# Patient Record
Sex: FEMALE | Race: BLACK OR AFRICAN AMERICAN | NOT HISPANIC OR LATINO | URBAN - METROPOLITAN AREA
[De-identification: names, ages, dates, MRNs, and addresses within clinical notes are randomized per-mention and may not be internally consistent; named-entity substitution may affect disease eponyms.]

---

## 2022-01-10 ENCOUNTER — INPATIENT (INPATIENT)
Facility: HOSPITAL | Age: 77
LOS: 10 days | Discharge: NURSING, REHAB OR LONG-TERM CARE | DRG: 177 | End: 2022-01-21
Attending: STUDENT IN AN ORGANIZED HEALTH CARE EDUCATION/TRAINING PROGRAM | Admitting: STUDENT IN AN ORGANIZED HEALTH CARE EDUCATION/TRAINING PROGRAM
Payer: MEDICARE

## 2022-01-10 VITALS
TEMPERATURE: 99 F | DIASTOLIC BLOOD PRESSURE: 79 MMHG | WEIGHT: 199.96 LBS | HEART RATE: 82 BPM | OXYGEN SATURATION: 97 % | HEIGHT: 62 IN | RESPIRATION RATE: 20 BRPM | SYSTOLIC BLOOD PRESSURE: 119 MMHG

## 2022-01-10 DIAGNOSIS — E03.9 HYPOTHYROIDISM, UNSPECIFIED: ICD-10-CM

## 2022-01-10 DIAGNOSIS — R55 SYNCOPE AND COLLAPSE: ICD-10-CM

## 2022-01-10 DIAGNOSIS — U07.1 COVID-19: ICD-10-CM

## 2022-01-10 DIAGNOSIS — R41.82 ALTERED MENTAL STATUS, UNSPECIFIED: ICD-10-CM

## 2022-01-10 DIAGNOSIS — Z29.9 ENCOUNTER FOR PROPHYLACTIC MEASURES, UNSPECIFIED: ICD-10-CM

## 2022-01-10 DIAGNOSIS — G93.40 ENCEPHALOPATHY, UNSPECIFIED: ICD-10-CM

## 2022-01-10 DIAGNOSIS — D86.9 SARCOIDOSIS, UNSPECIFIED: ICD-10-CM

## 2022-01-10 LAB
ALBUMIN SERPL ELPH-MCNC: 2.3 G/DL — LOW (ref 3.5–5)
ALP SERPL-CCNC: 52 U/L — SIGNIFICANT CHANGE UP (ref 40–120)
ALT FLD-CCNC: 33 U/L DA — SIGNIFICANT CHANGE UP (ref 10–60)
ANION GAP SERPL CALC-SCNC: 7 MMOL/L — SIGNIFICANT CHANGE UP (ref 5–17)
APPEARANCE UR: CLEAR — SIGNIFICANT CHANGE UP
APTT BLD: 32.1 SEC — SIGNIFICANT CHANGE UP (ref 27.5–35.5)
AST SERPL-CCNC: 70 U/L — HIGH (ref 10–40)
BASOPHILS # BLD AUTO: 0.04 K/UL — SIGNIFICANT CHANGE UP (ref 0–0.2)
BASOPHILS NFR BLD AUTO: 0.5 % — SIGNIFICANT CHANGE UP (ref 0–2)
BILIRUB SERPL-MCNC: 1.4 MG/DL — HIGH (ref 0.2–1.2)
BILIRUB UR-MCNC: NEGATIVE — SIGNIFICANT CHANGE UP
BUN SERPL-MCNC: 12 MG/DL — SIGNIFICANT CHANGE UP (ref 7–18)
CALCIUM SERPL-MCNC: 8.5 MG/DL — SIGNIFICANT CHANGE UP (ref 8.4–10.5)
CHLORIDE SERPL-SCNC: 100 MMOL/L — SIGNIFICANT CHANGE UP (ref 96–108)
CK SERPL-CCNC: 488 U/L — HIGH (ref 21–215)
CO2 SERPL-SCNC: 30 MMOL/L — SIGNIFICANT CHANGE UP (ref 22–31)
COLOR SPEC: YELLOW — SIGNIFICANT CHANGE UP
CREAT SERPL-MCNC: 0.95 MG/DL — SIGNIFICANT CHANGE UP (ref 0.5–1.3)
DIFF PNL FLD: ABNORMAL
EOSINOPHIL # BLD AUTO: 0.14 K/UL — SIGNIFICANT CHANGE UP (ref 0–0.5)
EOSINOPHIL NFR BLD AUTO: 1.7 % — SIGNIFICANT CHANGE UP (ref 0–6)
GLUCOSE SERPL-MCNC: 117 MG/DL — HIGH (ref 70–99)
GLUCOSE UR QL: NEGATIVE — SIGNIFICANT CHANGE UP
HCT VFR BLD CALC: 41.5 % — SIGNIFICANT CHANGE UP (ref 34.5–45)
HGB BLD-MCNC: 13.6 G/DL — SIGNIFICANT CHANGE UP (ref 11.5–15.5)
IMM GRANULOCYTES NFR BLD AUTO: 1.5 % — SIGNIFICANT CHANGE UP (ref 0–1.5)
INR BLD: 1.08 RATIO — SIGNIFICANT CHANGE UP (ref 0.88–1.16)
KETONES UR-MCNC: ABNORMAL
LACTATE SERPL-SCNC: 0.8 MMOL/L — SIGNIFICANT CHANGE UP (ref 0.7–2)
LEUKOCYTE ESTERASE UR-ACNC: NEGATIVE — SIGNIFICANT CHANGE UP
LYMPHOCYTES # BLD AUTO: 0.79 K/UL — LOW (ref 1–3.3)
LYMPHOCYTES # BLD AUTO: 9.3 % — LOW (ref 13–44)
MCHC RBC-ENTMCNC: 30 PG — SIGNIFICANT CHANGE UP (ref 27–34)
MCHC RBC-ENTMCNC: 32.8 GM/DL — SIGNIFICANT CHANGE UP (ref 32–36)
MCV RBC AUTO: 91.4 FL — SIGNIFICANT CHANGE UP (ref 80–100)
MONOCYTES # BLD AUTO: 0.69 K/UL — SIGNIFICANT CHANGE UP (ref 0–0.9)
MONOCYTES NFR BLD AUTO: 8.1 % — SIGNIFICANT CHANGE UP (ref 2–14)
NEUTROPHILS # BLD AUTO: 6.68 K/UL — SIGNIFICANT CHANGE UP (ref 1.8–7.4)
NEUTROPHILS NFR BLD AUTO: 78.9 % — HIGH (ref 43–77)
NITRITE UR-MCNC: NEGATIVE — SIGNIFICANT CHANGE UP
NRBC # BLD: 0 /100 WBCS — SIGNIFICANT CHANGE UP (ref 0–0)
NT-PROBNP SERPL-SCNC: 47 PG/ML — SIGNIFICANT CHANGE UP (ref 0–450)
PH UR: 6 — SIGNIFICANT CHANGE UP (ref 5–8)
PLATELET # BLD AUTO: 480 K/UL — HIGH (ref 150–400)
POTASSIUM SERPL-MCNC: 4.6 MMOL/L — SIGNIFICANT CHANGE UP (ref 3.5–5.3)
POTASSIUM SERPL-SCNC: 4.6 MMOL/L — SIGNIFICANT CHANGE UP (ref 3.5–5.3)
PROT SERPL-MCNC: 7.7 G/DL — SIGNIFICANT CHANGE UP (ref 6–8.3)
PROT UR-MCNC: 30 MG/DL
PROTHROM AB SERPL-ACNC: 12.8 SEC — SIGNIFICANT CHANGE UP (ref 10.6–13.6)
RBC # BLD: 4.54 M/UL — SIGNIFICANT CHANGE UP (ref 3.8–5.2)
RBC # FLD: 13.6 % — SIGNIFICANT CHANGE UP (ref 10.3–14.5)
SARS-COV-2 RNA SPEC QL NAA+PROBE: DETECTED
SODIUM SERPL-SCNC: 137 MMOL/L — SIGNIFICANT CHANGE UP (ref 135–145)
SP GR SPEC: 1.01 — SIGNIFICANT CHANGE UP (ref 1.01–1.02)
TROPONIN I, HIGH SENSITIVITY RESULT: 19.6 NG/L — SIGNIFICANT CHANGE UP
UROBILINOGEN FLD QL: 4
WBC # BLD: 8.47 K/UL — SIGNIFICANT CHANGE UP (ref 3.8–10.5)
WBC # FLD AUTO: 8.47 K/UL — SIGNIFICANT CHANGE UP (ref 3.8–10.5)

## 2022-01-10 PROCEDURE — 93010 ELECTROCARDIOGRAM REPORT: CPT

## 2022-01-10 PROCEDURE — 99223 1ST HOSP IP/OBS HIGH 75: CPT

## 2022-01-10 PROCEDURE — 70450 CT HEAD/BRAIN W/O DYE: CPT | Mod: 26,MA

## 2022-01-10 PROCEDURE — 99285 EMERGENCY DEPT VISIT HI MDM: CPT

## 2022-01-10 PROCEDURE — 71045 X-RAY EXAM CHEST 1 VIEW: CPT | Mod: 26

## 2022-01-10 RX ORDER — SODIUM CHLORIDE 9 MG/ML
1000 INJECTION INTRAMUSCULAR; INTRAVENOUS; SUBCUTANEOUS ONCE
Refills: 0 | Status: COMPLETED | OUTPATIENT
Start: 2022-01-10 | End: 2022-01-10

## 2022-01-10 RX ORDER — CEFTRIAXONE 500 MG/1
1000 INJECTION, POWDER, FOR SOLUTION INTRAMUSCULAR; INTRAVENOUS ONCE
Refills: 0 | Status: DISCONTINUED | OUTPATIENT
Start: 2022-01-10 | End: 2022-01-10

## 2022-01-10 RX ADMIN — SODIUM CHLORIDE 1000 MILLILITER(S): 9 INJECTION INTRAMUSCULAR; INTRAVENOUS; SUBCUTANEOUS at 18:00

## 2022-01-10 RX ADMIN — SODIUM CHLORIDE 1000 MILLILITER(S): 9 INJECTION INTRAMUSCULAR; INTRAVENOUS; SUBCUTANEOUS at 19:05

## 2022-01-10 NOTE — H&P ADULT - PROBLEM SELECTOR PLAN 4
- h/o sarcoidosis, not on any meds  - CXR no hilar or mediastinal adenopathy noted  - supportive care

## 2022-01-10 NOTE — H&P ADULT - PROBLEM SELECTOR PLAN 2
- ddx: hypothyroidism vs seizure vs COVID encephalopathy  - as above  - primary team to consult neuro in am

## 2022-01-10 NOTE — H&P ADULT - PROBLEM SELECTOR PLAN 1
- p/w loss of consciousness at home, w/ possible shaking and post-ictal state; also AMS x2wks per daughter  - COVID pos  - lactate 0.8  - trop 19.6  -   - EKG NSR. lateral T wave inversion  - CTH unremarkable  - f/u orthostatic BP, echo, TSH, cortisol, ESR/CRP

## 2022-01-10 NOTE — H&P ADULT - ASSESSMENT
Patient is a 76yoF, AAOx2 and minimally ambulatory at baseline, w/ known PMH of sarcoidosis and hypothyroidism (non-complaint w/ medications), presents with loss of unconsciousness. Admitted for syncope work up.

## 2022-01-10 NOTE — ED PROVIDER NOTE - PROGRESS NOTE DETAILS
Spoke with patient's daughter, Dariana Vásquez, on phone.  Daughter reports that at approximately 10 or 11 am today, patient became very confused: didn't recognize daughter or granddaughter, couldn't perform ADL at her baseline, was speaking "gibberish", was unable to walk at her baseline.  At around 11am, patient lost consciousness while sitting.  Daughter reports that patient urinated on herself and was breathing heavily while unconscious but did not hit her head, did not exhibit any seizure activity, and did not empty her bowels.      Daughter states that the patient has been increasingly confused over the past 2-3 months, including seeing figures walking around.  One week ago, patient was very confused and had a cough, and was brought into [facility], where she was diagnosed with a UTI and COVID and discharged on antibiotics - daughter unable to recall which antibiotics, which she has been taking regularly twice daily.  Patient lives at home with her grandson and granddaughter.  At baseline, she is oriented to self, recognizes family members, and is able to perform most ADLs with some help.      PMH: sarcoidosis, hypothyroid  PSH: hysterectomy many years ago  medications: none regularly  allergies: NKDA  Soc: patient chews tobacco daily, does not drink alcohol or use other illicit drugs. Spoke with patient's daughter, Dariana Vásquez, on phone.  Informed that patient will be admitted to the hospital for further workup.  Daughter expressed understanding.  Daughter confirmed that patient does not have a PCP.

## 2022-01-10 NOTE — ED PROVIDER NOTE - ATTENDING CONTRIBUTION TO CARE
Patient with ams and confused speech today at home as well as syncope and urinary incontinence. no obvious shaking noted by family. on exam, awake, follows commands, strength and sensation grossly intact in both arms and legs, face symmetric. NIHSS 0, passed dysphagia evaluation.

## 2022-01-10 NOTE — ED PROVIDER NOTE - OBJECTIVE STATEMENT
Patient is a 77 y/o Female poor historian unable to provide chief complaint.  Patient does not know why she is in the hospital and states that she feels well.  Denies nausea, vomiting, headache, loss of appetite, recent falls, syncope.

## 2022-01-10 NOTE — H&P ADULT - HISTORY OF PRESENT ILLNESS
Patient is a 76yoF, AAOx2 and minimally ambulatory at baseline, w/ known PMH of sarcoidosis and hypothyroidism (non-complaint w/ medications), presents with loss of unconsciousness. Patient is a poor historian; thus, information obtained from the daughter, Mrs. Westbrook. Patient was noted to be breathing heavy, and suddently fell to a chair. She was also unresponsive during the event. Patient does not remember the events. She complains of sharp, intermittent, 3/10, non-radiating, b/l leg pain x2wk. She denies fever, chills, n/v, chest pain, SOB, abdominal pain, urinary or bowel movement changes.    According to the daughter, patient started to develop altered mental status since last week. She was taken to the [facility] for that reason, but was discharged the next day. Patient was intermittently unable to operate phone or TV, and noted to speak in "jibberish. She was also seen looking for a person, who reportedly already passed away.

## 2022-01-10 NOTE — ED PROVIDER NOTE - PHYSICAL EXAMINATION
PHYSICAL EXAM:    GENERAL: NAD, well-developed  HEAD:  Atraumatic, Normocephalic  EYES: EOMI, PERRLA, conjunctiva and sclera clear  NECK: Supple, No JVD  CHEST/LUNG: Clear to auscultation bilaterally; No rhonchis, rales, or wheezing  HEART: Regular rate and rhythm; S1, S2; No murmurs, rubs, or gallops  ABDOMEN: Soft, Nontender, Nondistended; Normoactive bowel sounds  EXTREMITIES:  2+ Peripheral Pulses, No clubbing, cyanosis, or edema  PSYCH: A&Ox1, oriented only to self  NEUROLOGY: non-focal  SKIN: No rashes or lesions

## 2022-01-10 NOTE — H&P ADULT - ATTENDING COMMENTS
Discussed with PGY2 Dr. Ghosh.    This is a 75 y/o female presenting with altered mental status per family. Patient was recently diagnosed with UTI and COVID-19 at [facility] and discharged on oral antibiotics. Per daughter, patient's baseline mental status has been worsening over past few months, but patient acutely became more confused this morning, unable to recognize daughter.    Vital Signs Last 24 Hrs  T(C): 36.6 (10 Ryan 2022 15:33), Max: 37.1 (10 Ryan 2022 12:59)  T(F): 97.8 (10 Ryan 2022 15:33), Max: 98.7 (10 Ryan 2022 12:59)  HR: 68 (10 Ryan 2022 15:33) (68 - 82)  BP: 110/70 (10 Ryan 2022 15:33) (110/70 - 119/79)  BP(mean): --  RR: 20 (10 Ryan 2022 15:33) (20 - 20)  SpO2: 93% (10 Ryan 2022 15:33) (93% - 97%)    A/P:  1. Encephalopathy  - CT head reviewed w/o acute pathology; somewhat limited by motion artifact  - afebrile, no leukocytosis; UA negative  - no electrolyte derangements on initial lab studies  - can consider Covid-related encephalopathy superimposed on worsening dementia over past several months  - will need to review patient's home medications to r/o iatrogenic causes  - fall precautions  - avoid sedative hypnotics    2. Abnormal EKG  - EKG reviewed - TWI in anterior and lateral leads appreciated  - no prior available to compare  - obtain records from recent hospital visit - perhaps chronic?  - given patient does endorse SOB, will monitor on tele for now  - trop negative x1 - will check another set  - check TTE    3. COVID-19  - not requiring any O2 support  - isolation, supportive care    4. DVT ppx  - lovenox Discussed with PGY2 Dr. Ghosh.    This is a 75 y/o female presenting with altered mental status per family. Patient was recently diagnosed with UTI and COVID-19 at [facility] and discharged on oral antibiotics. Per daughter, patient's baseline mental status has been worsening over past few months, but patient acutely became more confused this morning, unable to recognize daughter. Brief period of LOC vs decreased consciousness per daughter.    Vital Signs Last 24 Hrs  T(C): 36.6 (10 Ryan 2022 15:33), Max: 37.1 (10 Ryan 2022 12:59)  T(F): 97.8 (10 Ryan 2022 15:33), Max: 98.7 (10 Ryan 2022 12:59)  HR: 68 (10 Ryan 2022 15:33) (68 - 82)  BP: 110/70 (10 Ryan 2022 15:33) (110/70 - 119/79)  BP(mean): --  RR: 20 (10 Ryan 2022 15:33) (20 - 20)  SpO2: 93% (10 Ryan 2022 15:33) (93% - 97%)    A/P:  1. Encephalopathy  - CT head reviewed w/o acute pathology; somewhat limited by motion artifact  - afebrile, no leukocytosis; UA negative  - no electrolyte derangements on initial lab studies  - can consider Covid-related encephalopathy superimposed on worsening dementia over past several months vs seizure-like activity  - will need to review patient's home medications to r/o iatrogenic causes  - fall precautions  - avoid sedative hypnotics  - neuro eval    2. Abnormal EKG  - EKG reviewed - TWI in multiple leads noted  - no prior available to compare  - obtain records from recent hospital visit - perhaps chronic?  - given patient does endorse subjective SOB, will monitor on tele for now  - trop negative x1 - will check another set  - check TTE    3. COVID-19  - not requiring any O2 support  - isolation, supportive care    4. DVT ppx  - lovenox Discussed with PGY2 Dr. Ghosh.    This is a 77 y/o female presenting with altered mental status per family. Patient was recently diagnosed with UTI and COVID-19 at [facility] and discharged on oral antibiotics. Per daughter, patient's baseline mental status has been worsening over past few months, but patient acutely became more confused this morning, unable to recognize daughter. Brief period of LOC vs decreased consciousness per daughter.    Vital Signs Last 24 Hrs  T(C): 36.6 (10 Ryan 2022 15:33), Max: 37.1 (10 Ryan 2022 12:59)  T(F): 97.8 (10 Ryan 2022 15:33), Max: 98.7 (10 Ryan 2022 12:59)  HR: 68 (10 Ryan 2022 15:33) (68 - 82)  BP: 110/70 (10 Ryan 2022 15:33) (110/70 - 119/79)  BP(mean): --  RR: 20 (10 Ryan 2022 15:33) (20 - 20)  SpO2: 93% (10 Ryan 2022 15:33) (93% - 97%)    A/P:  1. Encephalopathy  - CT head reviewed w/o acute pathology; somewhat limited by motion artifact  - afebrile, no leukocytosis; UA negative  - no electrolyte derangements on initial lab studies  - can consider Covid-related encephalopathy superimposed on worsening dementia over past several months vs seizure-like activity  - will need to review patient's home medications to r/o iatrogenic causes  - fall precautions  - avoid sedative hypnotics  - neuro eval    2. Abnormal EKG  - EKG reviewed - TWI in multiple leads noted  - no prior available to compare  - obtain records from recent hospital visit - perhaps chronic?  - given patient does endorse subjective SOB, will monitor on tele for now  - trop negative x1 - will check another set  - check TTE    3. COVID-19  - not requiring any O2 support  - isolation, supportive care    4. Hypothyroidism  - per daughter patient noncompliant with meds  - check TSH    5. Sarcoidosis  - not on any meds at home  - no active signs or symptoms at this time; unclear how patient was diagnosed  - outpatient follow up     6. DVT ppx  - lovenox Discussed with PGY2 Dr. Ghosh.    This is a 77 y/o female presenting with altered mental status per family. Patient was recently diagnosed with UTI and COVID-19 at [facility] and discharged on oral antibiotics. Per daughter, patient's baseline mental status has been worsening over past few months, but patient acutely became more confused this morning, unable to recognize daughter. Brief period of LOC vs decreased consciousness per daughter.    Vital Signs Last 24 Hrs  T(C): 36.6 (10 Ryan 2022 15:33), Max: 37.1 (10 Ryan 2022 12:59)  T(F): 97.8 (10 Ryan 2022 15:33), Max: 98.7 (10 Ryan 2022 12:59)  HR: 68 (10 Ryan 2022 15:33) (68 - 82)  BP: 110/70 (10 Ryan 2022 15:33) (110/70 - 119/79)  BP(mean): --  RR: 20 (10 Ryan 2022 15:33) (20 - 20)  SpO2: 93% (10 Ryan 2022 15:33) (93% - 97%)    A/P:  1. Encephalopathy  - CT head reviewed w/o acute pathology; somewhat limited by motion artifact  - afebrile, no leukocytosis; UA negative  - no electrolyte derangements on initial lab studies  - can consider Covid-related encephalopathy superimposed on worsening dementia over past several months vs seizure-like activity  - will need to review any potential home medications to r/o iatrogenic causes  - fall precautions  - avoid sedative hypnotics  - neuro eval    2. Abnormal EKG  - EKG reviewed - TWI in multiple leads noted  - no prior available to compare  - obtain records from recent hospital visit - perhaps chronic?  - given patient does endorse subjective SOB, will monitor on tele for now  - trop negative x1 - will check another set  - check TTE    3. COVID-19  - not requiring any O2 support  - isolation, supportive care    4. Hypothyroidism  - per daughter patient noncompliant with meds  - check TSH    5. Sarcoidosis  - not on any meds at home  - no active signs or symptoms at this time; unclear how patient was diagnosed  - outpatient follow up     6. DVT ppx  - lovenox

## 2022-01-10 NOTE — ED PROVIDER NOTE - CLINICAL SUMMARY MEDICAL DECISION MAKING FREE TEXT BOX
76y F poor historian unable to provide history.  Patient does not know why she is in the hospital and states that she feels well.  Denies nausea, vomiting, headache, loss of appetite, recent falls, syncope.  No contacts listed.  Sepsis and neuro workup ordered

## 2022-01-11 LAB
A1C WITH ESTIMATED AVERAGE GLUCOSE RESULT: 6 % — HIGH (ref 4–5.6)
ALBUMIN SERPL ELPH-MCNC: 2.4 G/DL — LOW (ref 3.5–5)
ALP SERPL-CCNC: 49 U/L — SIGNIFICANT CHANGE UP (ref 40–120)
ALT FLD-CCNC: 28 U/L DA — SIGNIFICANT CHANGE UP (ref 10–60)
ANION GAP SERPL CALC-SCNC: 6 MMOL/L — SIGNIFICANT CHANGE UP (ref 5–17)
ANION GAP SERPL CALC-SCNC: 8 MMOL/L — SIGNIFICANT CHANGE UP (ref 5–17)
AST SERPL-CCNC: 31 U/L — SIGNIFICANT CHANGE UP (ref 10–40)
BASOPHILS # BLD AUTO: 0.04 K/UL — SIGNIFICANT CHANGE UP (ref 0–0.2)
BASOPHILS NFR BLD AUTO: 0.4 % — SIGNIFICANT CHANGE UP (ref 0–2)
BILIRUB SERPL-MCNC: 1.3 MG/DL — HIGH (ref 0.2–1.2)
BUN SERPL-MCNC: 10 MG/DL — SIGNIFICANT CHANGE UP (ref 7–18)
BUN SERPL-MCNC: 12 MG/DL — SIGNIFICANT CHANGE UP (ref 7–18)
CALCIUM SERPL-MCNC: 8.5 MG/DL — SIGNIFICANT CHANGE UP (ref 8.4–10.5)
CALCIUM SERPL-MCNC: 8.7 MG/DL — SIGNIFICANT CHANGE UP (ref 8.4–10.5)
CHLORIDE SERPL-SCNC: 103 MMOL/L — SIGNIFICANT CHANGE UP (ref 96–108)
CHLORIDE SERPL-SCNC: 105 MMOL/L — SIGNIFICANT CHANGE UP (ref 96–108)
CHOLEST SERPL-MCNC: 178 MG/DL — SIGNIFICANT CHANGE UP
CO2 SERPL-SCNC: 28 MMOL/L — SIGNIFICANT CHANGE UP (ref 22–31)
CO2 SERPL-SCNC: 29 MMOL/L — SIGNIFICANT CHANGE UP (ref 22–31)
CREAT SERPL-MCNC: 0.73 MG/DL — SIGNIFICANT CHANGE UP (ref 0.5–1.3)
CREAT SERPL-MCNC: 0.76 MG/DL — SIGNIFICANT CHANGE UP (ref 0.5–1.3)
CRP SERPL-MCNC: 108 MG/L — HIGH
D DIMER BLD IA.RAPID-MCNC: 399 NG/ML DDU — HIGH
EOSINOPHIL # BLD AUTO: 0.2 K/UL — SIGNIFICANT CHANGE UP (ref 0–0.5)
EOSINOPHIL NFR BLD AUTO: 2.2 % — SIGNIFICANT CHANGE UP (ref 0–6)
ERYTHROCYTE [SEDIMENTATION RATE] IN BLOOD: 80 MM/HR — HIGH (ref 0–20)
ESTIMATED AVERAGE GLUCOSE: 126 MG/DL — HIGH (ref 68–114)
FERRITIN SERPL-MCNC: 634 NG/ML — HIGH (ref 15–150)
FIBRINOGEN PPP-MCNC: 882 MG/DL — HIGH (ref 290–520)
GLUCOSE SERPL-MCNC: 106 MG/DL — HIGH (ref 70–99)
GLUCOSE SERPL-MCNC: 79 MG/DL — SIGNIFICANT CHANGE UP (ref 70–99)
HCT VFR BLD CALC: 40.8 % — SIGNIFICANT CHANGE UP (ref 34.5–45)
HCV AB S/CO SERPL IA: 0.12 S/CO — SIGNIFICANT CHANGE UP (ref 0–0.99)
HCV AB SERPL-IMP: SIGNIFICANT CHANGE UP
HDLC SERPL-MCNC: 43 MG/DL — LOW
HGB BLD-MCNC: 13.3 G/DL — SIGNIFICANT CHANGE UP (ref 11.5–15.5)
IMM GRANULOCYTES NFR BLD AUTO: 1 % — SIGNIFICANT CHANGE UP (ref 0–1.5)
LDH SERPL L TO P-CCNC: 312 U/L — HIGH (ref 120–225)
LIPID PNL WITH DIRECT LDL SERPL: 116 MG/DL — HIGH
LYMPHOCYTES # BLD AUTO: 1.17 K/UL — SIGNIFICANT CHANGE UP (ref 1–3.3)
LYMPHOCYTES # BLD AUTO: 13.1 % — SIGNIFICANT CHANGE UP (ref 13–44)
MAGNESIUM SERPL-MCNC: 2.4 MG/DL — SIGNIFICANT CHANGE UP (ref 1.6–2.6)
MCHC RBC-ENTMCNC: 29.8 PG — SIGNIFICANT CHANGE UP (ref 27–34)
MCHC RBC-ENTMCNC: 32.6 GM/DL — SIGNIFICANT CHANGE UP (ref 32–36)
MCV RBC AUTO: 91.5 FL — SIGNIFICANT CHANGE UP (ref 80–100)
MONOCYTES # BLD AUTO: 0.72 K/UL — SIGNIFICANT CHANGE UP (ref 0–0.9)
MONOCYTES NFR BLD AUTO: 8.1 % — SIGNIFICANT CHANGE UP (ref 2–14)
NEUTROPHILS # BLD AUTO: 6.7 K/UL — SIGNIFICANT CHANGE UP (ref 1.8–7.4)
NEUTROPHILS NFR BLD AUTO: 75.2 % — SIGNIFICANT CHANGE UP (ref 43–77)
NON HDL CHOLESTEROL: 135 MG/DL — HIGH
NRBC # BLD: 0 /100 WBCS — SIGNIFICANT CHANGE UP (ref 0–0)
PHOSPHATE SERPL-MCNC: 2.2 MG/DL — LOW (ref 2.5–4.5)
PLATELET # BLD AUTO: 503 K/UL — HIGH (ref 150–400)
POTASSIUM SERPL-MCNC: 2.7 MMOL/L — CRITICAL LOW (ref 3.5–5.3)
POTASSIUM SERPL-MCNC: 3.8 MMOL/L — SIGNIFICANT CHANGE UP (ref 3.5–5.3)
POTASSIUM SERPL-SCNC: 2.7 MMOL/L — CRITICAL LOW (ref 3.5–5.3)
POTASSIUM SERPL-SCNC: 3.8 MMOL/L — SIGNIFICANT CHANGE UP (ref 3.5–5.3)
PROCALCITONIN SERPL-MCNC: 0.03 NG/ML — SIGNIFICANT CHANGE UP (ref 0.02–0.1)
PROT SERPL-MCNC: 7.5 G/DL — SIGNIFICANT CHANGE UP (ref 6–8.3)
RBC # BLD: 4.46 M/UL — SIGNIFICANT CHANGE UP (ref 3.8–5.2)
RBC # FLD: 13.6 % — SIGNIFICANT CHANGE UP (ref 10.3–14.5)
SODIUM SERPL-SCNC: 139 MMOL/L — SIGNIFICANT CHANGE UP (ref 135–145)
SODIUM SERPL-SCNC: 140 MMOL/L — SIGNIFICANT CHANGE UP (ref 135–145)
T3FREE SERPL-MCNC: 1.17 PG/ML — LOW (ref 1.8–4.6)
T4 FREE SERPL-MCNC: 0.3 NG/DL — LOW (ref 0.9–1.8)
TRIGL SERPL-MCNC: 94 MG/DL — SIGNIFICANT CHANGE UP
TSH SERPL-MCNC: 45.2 UU/ML — HIGH (ref 0.34–4.82)
WBC # BLD: 8.92 K/UL — SIGNIFICANT CHANGE UP (ref 3.8–10.5)
WBC # FLD AUTO: 8.92 K/UL — SIGNIFICANT CHANGE UP (ref 3.8–10.5)

## 2022-01-11 PROCEDURE — 99233 SBSQ HOSP IP/OBS HIGH 50: CPT | Mod: GC

## 2022-01-11 RX ORDER — POTASSIUM CHLORIDE 20 MEQ
40 PACKET (EA) ORAL EVERY 4 HOURS
Refills: 0 | Status: DISCONTINUED | OUTPATIENT
Start: 2022-01-11 | End: 2022-01-11

## 2022-01-11 RX ORDER — POTASSIUM CHLORIDE 20 MEQ
10 PACKET (EA) ORAL
Refills: 0 | Status: COMPLETED | OUTPATIENT
Start: 2022-01-11 | End: 2022-01-11

## 2022-01-11 RX ORDER — POTASSIUM CHLORIDE 20 MEQ
40 PACKET (EA) ORAL EVERY 4 HOURS
Refills: 0 | Status: COMPLETED | OUTPATIENT
Start: 2022-01-11 | End: 2022-01-11

## 2022-01-11 RX ORDER — REMDESIVIR 5 MG/ML
100 INJECTION INTRAVENOUS EVERY 24 HOURS
Refills: 0 | Status: COMPLETED | OUTPATIENT
Start: 2022-01-12 | End: 2022-01-15

## 2022-01-11 RX ORDER — POTASSIUM PHOSPHATE, MONOBASIC POTASSIUM PHOSPHATE, DIBASIC 236; 224 MG/ML; MG/ML
15 INJECTION, SOLUTION INTRAVENOUS ONCE
Refills: 0 | Status: COMPLETED | OUTPATIENT
Start: 2022-01-11 | End: 2022-01-11

## 2022-01-11 RX ORDER — REMDESIVIR 5 MG/ML
200 INJECTION INTRAVENOUS EVERY 24 HOURS
Refills: 0 | Status: COMPLETED | OUTPATIENT
Start: 2022-01-11 | End: 2022-01-11

## 2022-01-11 RX ORDER — REMDESIVIR 5 MG/ML
INJECTION INTRAVENOUS
Refills: 0 | Status: COMPLETED | OUTPATIENT
Start: 2022-01-11 | End: 2022-01-15

## 2022-01-11 RX ORDER — ENOXAPARIN SODIUM 100 MG/ML
40 INJECTION SUBCUTANEOUS DAILY
Refills: 0 | Status: DISCONTINUED | OUTPATIENT
Start: 2022-01-11 | End: 2022-01-21

## 2022-01-11 RX ADMIN — Medication 100 MILLIEQUIVALENT(S): at 14:06

## 2022-01-11 RX ADMIN — Medication 100 MILLIEQUIVALENT(S): at 17:36

## 2022-01-11 RX ADMIN — POTASSIUM PHOSPHATE, MONOBASIC POTASSIUM PHOSPHATE, DIBASIC 62.5 MILLIMOLE(S): 236; 224 INJECTION, SOLUTION INTRAVENOUS at 19:04

## 2022-01-11 RX ADMIN — Medication 40 MILLIEQUIVALENT(S): at 19:15

## 2022-01-11 RX ADMIN — Medication 40 MILLIEQUIVALENT(S): at 14:06

## 2022-01-11 RX ADMIN — Medication 100 MILLIEQUIVALENT(S): at 10:32

## 2022-01-11 RX ADMIN — REMDESIVIR 500 MILLIGRAM(S): 5 INJECTION INTRAVENOUS at 20:34

## 2022-01-11 NOTE — PROGRESS NOTE ADULT - PROBLEM SELECTOR PLAN 1
· Patient complaining of muscle pain/spasms of right shoulder - chronic  · Has used flexeril in the past  · Will order flexeril 5mg bid prn - p/w loss of consciousness at home, w/ possible shaking and post-ictal state; also AMS x2wks per daughter  - COVID pos  - lactate 0.8  - trop 19.6  -   - EKG NSR. lateral T wave inversion  - CTH unremarkable  - f/u orthostatic BP, echo, TSH, cortisol, ESR/CRP P/w loss of consciousness at home, w/ possible shaking and post-ictal state; also AMS x2wks per daughter  - COVID pos  - lactate 0.8  - trop 19.6  -   EKG NSR. lateral T wave inversion  CTH unremarkable  TSH elevated 45.20, unknown if taking any thyroid meds at home  f/u Free T3/T4  f/u orthostatic BP  f/u echo  cortisol, ESR/CRP

## 2022-01-11 NOTE — PROGRESS NOTE ADULT - PROBLEM SELECTOR PLAN 3
- COVID pos  - no pulmonary sxs noted found to be COVID-19 pos  no pulmonary sxs noted  will monitor SpO2 currently saturating 90%  monitor SpO2 found to be COVID-19 pos  no pulmonary sxs noted  will monitor SpO2 currently saturating 90%  started on remdesivir   monitor SpO2

## 2022-01-11 NOTE — PROGRESS NOTE ADULT - PROBLEM SELECTOR PLAN 5
- h/o hypothyroidism, on no medications at home  - f/u TSH h/o hypothyroidism, on no medications at home  will verify is patient was ever taking any medication of thyroid medication   TSH elevated 45.20   f/u free T3/T4

## 2022-01-11 NOTE — PROGRESS NOTE ADULT - PROBLEM SELECTOR PLAN 2
- ddx: hypothyroidism vs seizure vs COVID encephalopathy  - as above  - primary team to consult neuro in am ddx: hypothyroidism vs seizure vs COVID encephalopathy  plan as above  will verify is patient was ever taking any medication of thyroid medication   consider Neuro consult

## 2022-01-11 NOTE — PATIENT PROFILE ADULT - FALL HARM RISK - HARM RISK INTERVENTIONS

## 2022-01-11 NOTE — PATIENT PROFILE ADULT - FUNCTIONAL ASSESSMENT - DAILY ACTIVITY 1.
2 = A lot of assistance Elidel Counseling: Patient may experience a mild burning sensation during topical application. Elidel is not approved in children less than 2 years of age. There have been case reports of hematologic and skin malignancies in patients using topical calcineurin inhibitors although causality is questionable.

## 2022-01-11 NOTE — PROGRESS NOTE ADULT - PROBLEM SELECTOR PLAN 4
- h/o sarcoidosis, not on any meds  - CXR no hilar or mediastinal adenopathy noted  - supportive care h/o sarcoidosis, not on any meds  CXR no hilar or mediastinal adenopathy noted  supportive care

## 2022-01-12 DIAGNOSIS — G93.41 METABOLIC ENCEPHALOPATHY: ICD-10-CM

## 2022-01-12 LAB
-  AMIKACIN: SIGNIFICANT CHANGE UP
-  AMIKACIN: SIGNIFICANT CHANGE UP
-  AZTREONAM: SIGNIFICANT CHANGE UP
-  AZTREONAM: SIGNIFICANT CHANGE UP
-  CEFEPIME: SIGNIFICANT CHANGE UP
-  CEFEPIME: SIGNIFICANT CHANGE UP
-  CEFTAZIDIME: SIGNIFICANT CHANGE UP
-  CEFTAZIDIME: SIGNIFICANT CHANGE UP
-  CIPROFLOXACIN: SIGNIFICANT CHANGE UP
-  CIPROFLOXACIN: SIGNIFICANT CHANGE UP
-  GENTAMICIN: SIGNIFICANT CHANGE UP
-  GENTAMICIN: SIGNIFICANT CHANGE UP
-  IMIPENEM: SIGNIFICANT CHANGE UP
-  IMIPENEM: SIGNIFICANT CHANGE UP
-  LEVOFLOXACIN: SIGNIFICANT CHANGE UP
-  LEVOFLOXACIN: SIGNIFICANT CHANGE UP
-  MEROPENEM: SIGNIFICANT CHANGE UP
-  MEROPENEM: SIGNIFICANT CHANGE UP
-  PIPERACILLIN/TAZOBACTAM: SIGNIFICANT CHANGE UP
-  PIPERACILLIN/TAZOBACTAM: SIGNIFICANT CHANGE UP
-  TOBRAMYCIN: SIGNIFICANT CHANGE UP
-  TOBRAMYCIN: SIGNIFICANT CHANGE UP
ALBUMIN SERPL ELPH-MCNC: 2.2 G/DL — LOW (ref 3.5–5)
ALP SERPL-CCNC: 45 U/L — SIGNIFICANT CHANGE UP (ref 40–120)
ALT FLD-CCNC: 27 U/L DA — SIGNIFICANT CHANGE UP (ref 10–60)
ANION GAP SERPL CALC-SCNC: 8 MMOL/L — SIGNIFICANT CHANGE UP (ref 5–17)
APTT BLD: 28.1 SEC — SIGNIFICANT CHANGE UP (ref 27.5–35.5)
AST SERPL-CCNC: 28 U/L — SIGNIFICANT CHANGE UP (ref 10–40)
BILIRUB SERPL-MCNC: 0.8 MG/DL — SIGNIFICANT CHANGE UP (ref 0.2–1.2)
BUN SERPL-MCNC: 10 MG/DL — SIGNIFICANT CHANGE UP (ref 7–18)
CALCIUM SERPL-MCNC: 8.7 MG/DL — SIGNIFICANT CHANGE UP (ref 8.4–10.5)
CHLORIDE SERPL-SCNC: 110 MMOL/L — HIGH (ref 96–108)
CO2 SERPL-SCNC: 26 MMOL/L — SIGNIFICANT CHANGE UP (ref 22–31)
CREAT SERPL-MCNC: 0.67 MG/DL — SIGNIFICANT CHANGE UP (ref 0.5–1.3)
CULTURE RESULTS: SIGNIFICANT CHANGE UP
GLUCOSE SERPL-MCNC: 84 MG/DL — SIGNIFICANT CHANGE UP (ref 70–99)
HCT VFR BLD CALC: 37.9 % — SIGNIFICANT CHANGE UP (ref 34.5–45)
HGB BLD-MCNC: 12.2 G/DL — SIGNIFICANT CHANGE UP (ref 11.5–15.5)
INR BLD: 1.12 RATIO — SIGNIFICANT CHANGE UP (ref 0.88–1.16)
MAGNESIUM SERPL-MCNC: 2.5 MG/DL — SIGNIFICANT CHANGE UP (ref 1.6–2.6)
MCHC RBC-ENTMCNC: 29.6 PG — SIGNIFICANT CHANGE UP (ref 27–34)
MCHC RBC-ENTMCNC: 32.2 GM/DL — SIGNIFICANT CHANGE UP (ref 32–36)
MCV RBC AUTO: 92 FL — SIGNIFICANT CHANGE UP (ref 80–100)
METHOD TYPE: SIGNIFICANT CHANGE UP
METHOD TYPE: SIGNIFICANT CHANGE UP
NRBC # BLD: 0 /100 WBCS — SIGNIFICANT CHANGE UP (ref 0–0)
ORGANISM # SPEC MICROSCOPIC CNT: SIGNIFICANT CHANGE UP
PHOSPHATE SERPL-MCNC: 2.4 MG/DL — LOW (ref 2.5–4.5)
PLATELET # BLD AUTO: 534 K/UL — HIGH (ref 150–400)
POTASSIUM SERPL-MCNC: 3.6 MMOL/L — SIGNIFICANT CHANGE UP (ref 3.5–5.3)
POTASSIUM SERPL-SCNC: 3.6 MMOL/L — SIGNIFICANT CHANGE UP (ref 3.5–5.3)
PROT SERPL-MCNC: 6.7 G/DL — SIGNIFICANT CHANGE UP (ref 6–8.3)
PROTHROM AB SERPL-ACNC: 13.2 SEC — SIGNIFICANT CHANGE UP (ref 10.6–13.6)
RBC # BLD: 4.12 M/UL — SIGNIFICANT CHANGE UP (ref 3.8–5.2)
RBC # FLD: 13.8 % — SIGNIFICANT CHANGE UP (ref 10.3–14.5)
SODIUM SERPL-SCNC: 144 MMOL/L — SIGNIFICANT CHANGE UP (ref 135–145)
SPECIMEN SOURCE: SIGNIFICANT CHANGE UP
WBC # BLD: 7.49 K/UL — SIGNIFICANT CHANGE UP (ref 3.8–10.5)
WBC # FLD AUTO: 7.49 K/UL — SIGNIFICANT CHANGE UP (ref 3.8–10.5)

## 2022-01-12 PROCEDURE — 99233 SBSQ HOSP IP/OBS HIGH 50: CPT | Mod: GC

## 2022-01-12 RX ORDER — LEVOTHYROXINE SODIUM 125 MCG
50 TABLET ORAL DAILY
Refills: 0 | Status: DISCONTINUED | OUTPATIENT
Start: 2022-01-12 | End: 2022-01-21

## 2022-01-12 RX ADMIN — REMDESIVIR 500 MILLIGRAM(S): 5 INJECTION INTRAVENOUS at 21:14

## 2022-01-12 RX ADMIN — ENOXAPARIN SODIUM 40 MILLIGRAM(S): 100 INJECTION SUBCUTANEOUS at 13:23

## 2022-01-12 NOTE — PHYSICAL THERAPY INITIAL EVALUATION ADULT - DIAGNOSIS, PT EVAL
generalized weakness and deconditioning; pulmonary impairment; difficulty performing bed mobility and inability to perform transfers and ambulation

## 2022-01-12 NOTE — PHYSICAL THERAPY INITIAL EVALUATION ADULT - ADDITIONAL COMMENTS
As per discussion with her daughter Dariana ([phone]), she was previously capable of performing transfers and household ambulation but was gradually declining in motivation and performance; her daughter reports that the last time she was able to ambulate using a rolling walker around the house was around 2-3 weeks prior to hospitalization.

## 2022-01-12 NOTE — PROGRESS NOTE ADULT - PROBLEM SELECTOR PLAN 3
found to be COVID-19 pos  no pulmonary sxs noted  will monitor SpO2 currently saturating 90%  started on remdesivir   monitor SpO2 found to be COVID-19 pos  no pulmonary sxs noted  will monitor SpO2 currently saturating >90%, no respiratory distress  started on remdesivir   monitor SpO2

## 2022-01-12 NOTE — PROGRESS NOTE ADULT - PROBLEM SELECTOR PLAN 1
P/w loss of consciousness at home, w/ possible shaking and post-ictal state; also AMS x2wks per daughter  - COVID pos  - lactate 0.8  - trop 19.6  -   EKG NSR. lateral T wave inversion  CTH unremarkable  TSH elevated 45.20, unknown if taking any thyroid meds at home  f/u Free T3/T4  f/u orthostatic BP  f/u echo  cortisol, ESR/CRP P/w loss of consciousness at home, w/ possible shaking and post-ictal state; also AMS x2wks per daughter  - COVID pos  - lactate 0.8  - trop 19.6  -   EKG NSR. lateral T wave inversion  CTH unremarkable  TSH elevated 45.20, unknown if taking any thyroid meds at home  Low Free T3 1.17, low Free T4 0.3  f/u orthostatic BP  ECHO no longer indicated as syncopal episode likely due to uncontrolled hypothyroidism  started on levothyroxine

## 2022-01-12 NOTE — PHYSICAL THERAPY INITIAL EVALUATION ADULT - IMPAIRMENTS FOUND, PT EVAL
aerobic capacity/endurance/cognitive impairment/gait, locomotion, and balance/gross motor/muscle strength/poor safety awareness/posture

## 2022-01-12 NOTE — PROGRESS NOTE ADULT - SUBJECTIVE AND OBJECTIVE BOX
PGY-1 Progress Note discussed with attending    PAGER #: [[phone]] TILL 5:00 PM  PLEASE CONTACT ON CALL TEAM:  - On Call Team (Please refer to Patricio) FROM 5:00 PM - 8:30PM  - Nightfloat Team FROM 8:30 -7:30 AM    CHIEF COMPLAINT & BRIEF HOSPITAL COURSE:  Patient is a 76yoF, AAOx2 and minimally ambulatory at baseline, w/ known PMH of sarcoidosis and hypothyroidism (non-complaint w/ medications), presents with loss of unconsciousness. Patient is a poor historian; thus, information obtained from the daughter, Mrs. Westbrook. Patient was noted to be breathing heavy, and suddently fell to a chair. She was also unresponsive during the event. Patient does not remember the events. She complains of sharp, intermittent, 3/10, non-radiating, b/l leg pain x2wk. She denies fever, chills, n/v, chest pain, SOB, abdominal pain, urinary or bowel movement changes.    According to the daughter, patient started to develop altered mental status since last week. She was taken to the [facility] for that reason, but was discharged the next day. Patient was intermittently unable to operate phone or TV, and noted to speak in "jibberish. She was also seen looking for a person, who reportedly already passed away.  (10 Ryan 2022 22:18)      INTERVAL HPI/OVERNIGHT EVENTS:           MEDICATIONS  (STANDING):  enoxaparin Injectable 40 milliGRAM(s) SubCutaneous daily  remdesivir  IVPB   IV Intermittent   remdesivir  IVPB 100 milliGRAM(s) IV Intermittent every 24 hours      REVIEW OF SYSTEMS:  CONSTITUTIONAL: No fever, weight loss, or fatigue  RESPIRATORY: No cough, wheezing, chills or hemoptysis; No shortness of breath  CARDIOVASCULAR: No chest pain, palpitations, dizziness, or leg swelling  GASTROINTESTINAL: No abdominal pain. No nausea, vomiting, or hematemesis; No diarrhea or constipation. No melena or hematochezia.  GENITOURINARY: No dysuria or hematuria, urinary frequency  NEUROLOGICAL: No headaches, memory loss, loss of strength, numbness, or tremors  SKIN: No itching, burning, rashes, or lesions     Vital Signs Last 24 Hrs  T(C): 36.8 (12 Jan 2022 04:58), Max: 36.8 (12 Jan 2022 04:58)  T(F): 98.3 (12 Jan 2022 04:58), Max: 98.3 (12 Jan 2022 04:58)  HR: 69 (12 Jan 2022 04:58) (68 - 89)  BP: 145/81 (12 Jan 2022 04:58) (118/66 - 145/81)  BP(mean): --  RR: 18 (12 Jan 2022 04:58) (18 - 18)  SpO2: 96% (12 Jan 2022 04:58) (90% - 96%)    PHYSICAL EXAMINATION:  GENERAL: NAD, well built  HEAD:  Atraumatic, Normocephalic  EYES:  conjunctiva and sclera clear  NECK: Supple, No JVD, Normal thyroid  CHEST/LUNG: Clear to auscultation. Clear to percussion bilaterally; No rales, rhonchi, wheezing, or rubs  HEART: Regular rate and rhythm; No murmurs, rubs, or gallops  ABDOMEN: Soft, Nontender, Nondistended; Bowel sounds present  NERVOUS SYSTEM:  Alert & Oriented X3,    EXTREMITIES:  2+ Peripheral Pulses, No clubbing, cyanosis, or edema  SKIN: warm dry                          12.2   7.49  )-----------( 534      ( 12 Jan 2022 07:06 )             37.9     01-12    144  |  110<H>  |  x   ----------------------------<  x   3.6   |  x   |  x     Ca    8.7      11 Jan 2022 19:13  Phos  2.2     01-11  Mg     2.4     01-11    TPro  7.5  /  Alb  2.4<L>  /  TBili  1.3<H>  /  DBili  x   /  AST  31  /  ALT  28  /  AlkPhos  49  01-11    LIVER FUNCTIONS - ( 11 Jan 2022 06:32 )  Alb: 2.4 g/dL / Pro: 7.5 g/dL / ALK PHOS: 49 U/L / ALT: 28 U/L DA / AST: 31 U/L / GGT: x           CARDIAC MARKERS ( 10 Ryan 2022 14:43 )  x     / x     / 488 U/L / x     / x          PT/INR - ( 12 Jan 2022 07:06 )   PT: 13.2 sec;   INR: 1.12 ratio         PTT - ( 12 Jan 2022 07:06 )  PTT:28.1 sec    CAPILLARY BLOOD GLUCOSE      RADIOLOGY & ADDITIONAL TESTS:                   PGY-1 Progress Note discussed with attending    PAGER #: [[phone]] TILL 5:00 PM  PLEASE CONTACT ON CALL TEAM:  - On Call Team (Please refer to Patricio) FROM 5:00 PM - 8:30PM  - Nightfloat Team FROM 8:30 -7:30 AM    CHIEF COMPLAINT & BRIEF HOSPITAL COURSE:  Patient is a 76yoF, AAOx2 and minimally ambulatory at baseline, w/ known PMH of sarcoidosis and hypothyroidism (non-complaint w/ medications), presents with loss of unconsciousness. Patient is a poor historian; thus, information obtained from the daughter, Mrs. Westbrook. Patient was noted to be breathing heavy, and suddently fell to a chair. She was also unresponsive during the event. Patient does not remember the events. She complains of sharp, intermittent, 3/10, non-radiating, b/l leg pain x2wk. She denies fever, chills, n/v, chest pain, SOB, abdominal pain, urinary or bowel movement changes.    According to the daughter, patient started to develop altered mental status since last week. She was taken to the [facility] for that reason, but was discharged the next day. Patient was intermittently unable to operate phone or TV, and noted to speak in "jibberish. She was also seen looking for a person, who reportedly already passed away.  (10 Ryan 2022 22:18)      INTERVAL HPI/OVERNIGHT EVENTS:   No over night events noted. Patient SpO2 is improving. Patient started on Levothyroxine. Patient to be seen by PT today.         MEDICATIONS  (STANDING):  enoxaparin Injectable 40 milliGRAM(s) SubCutaneous daily  remdesivir  IVPB   IV Intermittent   remdesivir  IVPB 100 milliGRAM(s) IV Intermittent every 24 hours      REVIEW OF SYSTEMS:  CONSTITUTIONAL: No fever, weight loss, or fatigue  RESPIRATORY: No cough, wheezing, chills or hemoptysis; No shortness of breath  CARDIOVASCULAR: No chest pain, palpitations, dizziness, or leg swelling  GASTROINTESTINAL: No abdominal pain. No nausea, vomiting, or hematemesis; No diarrhea or constipation. No melena or hematochezia.  GENITOURINARY: No dysuria or hematuria, urinary frequency  NEUROLOGICAL: No headaches, memory loss, loss of strength, numbness, or tremors  SKIN: No itching, burning, rashes, or lesions     Vital Signs Last 24 Hrs  T(C): 36.8 (12 Jan 2022 04:58), Max: 36.8 (12 Jan 2022 04:58)  T(F): 98.3 (12 Jan 2022 04:58), Max: 98.3 (12 Jan 2022 04:58)  HR: 69 (12 Jan 2022 04:58) (68 - 89)  BP: 145/81 (12 Jan 2022 04:58) (118/66 - 145/81)  BP(mean): --  RR: 18 (12 Jan 2022 04:58) (18 - 18)  SpO2: 96% (12 Jan 2022 04:58) (90% - 96%)    PHYSICAL EXAMINATION:  GENERAL: NAD, well built  HEAD:  Atraumatic, Normocephalic  EYES:  conjunctiva and sclera clear  NECK: Supple, No JVD  CHEST/LUNG: Clear to auscultation. No rales, rhonchi, wheezing, or rubs  HEART: Regular rate and rhythm; No murmurs, rubs, or gallops  ABDOMEN: Soft, Nontender, Nondistended; Bowel sounds present  NERVOUS SYSTEM:  Alert & Oriented X2-3  EXTREMITIES:  2+ Peripheral Pulses, No clubbing, cyanosis, or edema  SKIN: warm dry                          12.2   7.49  )-----------( 534      ( 12 Jan 2022 07:06 )             37.9     01-12    144  |  110<H>  |  x   ----------------------------<  x   3.6   |  x   |  x     Ca    8.7      11 Jan 2022 19:13  Phos  2.2     01-11  Mg     2.4     01-11    TPro  7.5  /  Alb  2.4<L>  /  TBili  1.3<H>  /  DBili  x   /  AST  31  /  ALT  28  /  AlkPhos  49  01-11    LIVER FUNCTIONS - ( 11 Jan 2022 06:32 )  Alb: 2.4 g/dL / Pro: 7.5 g/dL / ALK PHOS: 49 U/L / ALT: 28 U/L DA / AST: 31 U/L / GGT: x           CARDIAC MARKERS ( 10 Ryan 2022 14:43 )  x     / x     / 488 U/L / x     / x          PT/INR - ( 12 Jan 2022 07:06 )   PT: 13.2 sec;   INR: 1.12 ratio    PTT - ( 12 Jan 2022 07:06 )  PTT:28.1 sec      RADIOLOGY & ADDITIONAL TESTS:

## 2022-01-12 NOTE — PROGRESS NOTE ADULT - PROBLEM SELECTOR PLAN 2
ddx: hypothyroidism vs seizure vs COVID encephalopathy  plan as above  will verify is patient was ever taking any medication of thyroid medication   consider Neuro consult h/o hypothyroidism, on no medications at home  will verify is patient was ever taking any medication of thyroid medication   TSH elevated 45.20  Low Free T3 1.17, low Free T4 0.3  started on levothyroxine

## 2022-01-12 NOTE — PROGRESS NOTE ADULT - PROBLEM SELECTOR PLAN 5
h/o hypothyroidism, on no medications at home  will verify is patient was ever taking any medication of thyroid medication   TSH elevated 45.20   f/u free T3/T4 h/o sarcoidosis, not on any meds  CXR no hilar or mediastinal adenopathy noted  supportive care

## 2022-01-12 NOTE — PHYSICAL THERAPY INITIAL EVALUATION ADULT - PATIENT/FAMILY/SIGNIFICANT OTHER GOALS STATEMENT, PT EVAL
Patient scheduled medication follow up 3/20/2018  
Received refill request for   sertraline (ZOLOFT) 100 MG tablet [Pharmacy Med Name: SERTRALINE TAB 100MG]  Allergy/Contraindication:ProzacReactions:Depression  TAKE 2 TABLETS DAILY   Eprescribe, Disp-180 tablet, R-0                         busPIRone (BUSPAR) 10 MG tablet [Pharmacy Med Name: BUSPIRONE TAB 10MG]  TAKE 1 TABLET TWICE A DAY   Eprescribe, Disp-180 tablet, R-0            Medication refilled per Dr Tafoya's signed protocol. Last appointment and appropriate labs reviewed.   
Was to be seen in Nov.  NO SHOWED APPOINTMENT. Does not have appointment schedule. 1 month of refills given. Must see Dr. VICTORIA to schedule.  
as per discussion with her daughter Dariana - to return to prior level of function and be able to participate and perform household ambulation and transfers while using a rolling walker

## 2022-01-12 NOTE — PHYSICAL THERAPY INITIAL EVALUATION ADULT - GENERAL OBSERVATIONS, REHAB EVAL
awake, alert, confused, oriented to self, able to follow simple commands. currently breathing on room air; +peripheral IV access on right forearm; + soft tissue, likely lipomatous mass on posterior neck

## 2022-01-12 NOTE — PROGRESS NOTE ADULT - PROBLEM SELECTOR PLAN 4
h/o sarcoidosis, not on any meds  CXR no hilar or mediastinal adenopathy noted  supportive care ddx: hypothyroidism vs seizure vs COVID encephalopathy   plan as above

## 2022-01-12 NOTE — PHYSICAL THERAPY INITIAL EVALUATION ADULT - MD/RN NOTIFIED
[id number] [facility]  [address] [place] [address]  Phone: [phone]  Fax: [phone]               July 27, 2019    Patient: Suyapa Whipple   YOB: 2002   Date of Visit: 7/27/2019       To Whom It May Concern:    Theresa Ly was seen and treated in our emergency department on 7/27/2019. She may return to work 7/28/19.       Sincerely,       Joni Jurado RN         Signature:__________________________________
yes

## 2022-01-13 LAB
ALBUMIN SERPL ELPH-MCNC: 2.2 G/DL — LOW (ref 3.5–5)
ALP SERPL-CCNC: 47 U/L — SIGNIFICANT CHANGE UP (ref 40–120)
ALT FLD-CCNC: 22 U/L DA — SIGNIFICANT CHANGE UP (ref 10–60)
ANION GAP SERPL CALC-SCNC: 6 MMOL/L — SIGNIFICANT CHANGE UP (ref 5–17)
AST SERPL-CCNC: 23 U/L — SIGNIFICANT CHANGE UP (ref 10–40)
BASOPHILS # BLD AUTO: 0.05 K/UL — SIGNIFICANT CHANGE UP (ref 0–0.2)
BASOPHILS NFR BLD AUTO: 0.6 % — SIGNIFICANT CHANGE UP (ref 0–2)
BILIRUB SERPL-MCNC: 0.9 MG/DL — SIGNIFICANT CHANGE UP (ref 0.2–1.2)
BUN SERPL-MCNC: 12 MG/DL — SIGNIFICANT CHANGE UP (ref 7–18)
CALCIUM SERPL-MCNC: 8.5 MG/DL — SIGNIFICANT CHANGE UP (ref 8.4–10.5)
CHLORIDE SERPL-SCNC: 108 MMOL/L — SIGNIFICANT CHANGE UP (ref 96–108)
CO2 SERPL-SCNC: 28 MMOL/L — SIGNIFICANT CHANGE UP (ref 22–31)
CREAT SERPL-MCNC: 0.83 MG/DL — SIGNIFICANT CHANGE UP (ref 0.5–1.3)
EOSINOPHIL # BLD AUTO: 0.12 K/UL — SIGNIFICANT CHANGE UP (ref 0–0.5)
EOSINOPHIL NFR BLD AUTO: 1.5 % — SIGNIFICANT CHANGE UP (ref 0–6)
GLUCOSE SERPL-MCNC: 84 MG/DL — SIGNIFICANT CHANGE UP (ref 70–99)
HCT VFR BLD CALC: 38 % — SIGNIFICANT CHANGE UP (ref 34.5–45)
HGB BLD-MCNC: 12.4 G/DL — SIGNIFICANT CHANGE UP (ref 11.5–15.5)
IMM GRANULOCYTES NFR BLD AUTO: 1.7 % — HIGH (ref 0–1.5)
LYMPHOCYTES # BLD AUTO: 1.37 K/UL — SIGNIFICANT CHANGE UP (ref 1–3.3)
LYMPHOCYTES # BLD AUTO: 17.5 % — SIGNIFICANT CHANGE UP (ref 13–44)
MCHC RBC-ENTMCNC: 30.2 PG — SIGNIFICANT CHANGE UP (ref 27–34)
MCHC RBC-ENTMCNC: 32.6 GM/DL — SIGNIFICANT CHANGE UP (ref 32–36)
MCV RBC AUTO: 92.7 FL — SIGNIFICANT CHANGE UP (ref 80–100)
MONOCYTES # BLD AUTO: 0.73 K/UL — SIGNIFICANT CHANGE UP (ref 0–0.9)
MONOCYTES NFR BLD AUTO: 9.3 % — SIGNIFICANT CHANGE UP (ref 2–14)
NEUTROPHILS # BLD AUTO: 5.45 K/UL — SIGNIFICANT CHANGE UP (ref 1.8–7.4)
NEUTROPHILS NFR BLD AUTO: 69.4 % — SIGNIFICANT CHANGE UP (ref 43–77)
NRBC # BLD: 0 /100 WBCS — SIGNIFICANT CHANGE UP (ref 0–0)
PLATELET # BLD AUTO: 565 K/UL — HIGH (ref 150–400)
POTASSIUM SERPL-MCNC: 3.6 MMOL/L — SIGNIFICANT CHANGE UP (ref 3.5–5.3)
POTASSIUM SERPL-SCNC: 3.6 MMOL/L — SIGNIFICANT CHANGE UP (ref 3.5–5.3)
PROT SERPL-MCNC: 6.7 G/DL — SIGNIFICANT CHANGE UP (ref 6–8.3)
RBC # BLD: 4.1 M/UL — SIGNIFICANT CHANGE UP (ref 3.8–5.2)
RBC # FLD: 14 % — SIGNIFICANT CHANGE UP (ref 10.3–14.5)
SODIUM SERPL-SCNC: 142 MMOL/L — SIGNIFICANT CHANGE UP (ref 135–145)
WBC # BLD: 7.85 K/UL — SIGNIFICANT CHANGE UP (ref 3.8–10.5)
WBC # FLD AUTO: 7.85 K/UL — SIGNIFICANT CHANGE UP (ref 3.8–10.5)

## 2022-01-13 PROCEDURE — 99232 SBSQ HOSP IP/OBS MODERATE 35: CPT | Mod: GC

## 2022-01-13 RX ADMIN — Medication 50 MICROGRAM(S): at 05:02

## 2022-01-13 RX ADMIN — ENOXAPARIN SODIUM 40 MILLIGRAM(S): 100 INJECTION SUBCUTANEOUS at 11:53

## 2022-01-13 RX ADMIN — REMDESIVIR 500 MILLIGRAM(S): 5 INJECTION INTRAVENOUS at 20:36

## 2022-01-13 NOTE — PROGRESS NOTE ADULT - SUBJECTIVE AND OBJECTIVE BOX
PGY-1 Progress Note discussed with attending    PAGER #: [[phone]] TILL 5:00 PM  PLEASE CONTACT ON CALL TEAM:  - On Call Team (Please refer to Patricio) FROM 5:00 PM - 8:30PM  - Nightfloat Team FROM 8:30 -7:30 AM    CHIEF COMPLAINT & BRIEF HOSPITAL COURSE:  Patient is a 76yoF, AAOx2 and minimally ambulatory at baseline, w/ known PMH of sarcoidosis and hypothyroidism (non-complaint w/ medications), presents with loss of unconsciousness. Patient is a poor historian; thus, information obtained from the daughter, Mrs. Westbrook. Patient was noted to be breathing heavy, and suddently fell to a chair. She was also unresponsive during the event. Patient does not remember the events. She complains of sharp, intermittent, 3/10, non-radiating, b/l leg pain x2wk. She denies fever, chills, n/v, chest pain, SOB, abdominal pain, urinary or bowel movement changes.    According to the daughter, patient started to develop altered mental status since last week. She was taken to the [facility] for that reason, but was discharged the next day. Patient was intermittently unable to operate phone or TV, and noted to speak in "jibberish. She was also seen looking for a person, who reportedly already passed away.  (10 Ryan 2022 22:18)      INTERVAL HPI/OVERNIGHT EVENTS:   No acute events overnight. Patient not in any respiratory distress, saturating in mid-low 90s on RA. Patient endorses that she is feeling better. Patient remains COVID (+), completed 2d of isolation. PT recs EILEEN, pending EILEEN COVID placement.    MEDICATIONS  (STANDING):  enoxaparin Injectable 40 milliGRAM(s) SubCutaneous daily  levothyroxine 50 MICROGram(s) Oral daily  remdesivir  IVPB   IV Intermittent   remdesivir  IVPB 100 milliGRAM(s) IV Intermittent every 24 hours      REVIEW OF SYSTEMS:  CONSTITUTIONAL: No fever, weight loss, or fatigue  RESPIRATORY: No cough, wheezing, chills or hemoptysis; No shortness of breath  CARDIOVASCULAR: No chest pain, palpitations, dizziness, or leg swelling  GASTROINTESTINAL: No abdominal pain. No nausea, vomiting, or hematemesis; No diarrhea or constipation. No melena or hematochezia.  GENITOURINARY: No dysuria or hematuria, urinary frequency  NEUROLOGICAL: No headaches, memory loss, loss of strength, numbness, or tremors  SKIN: No itching, burning, rashes, or lesions     Vital Signs Last 24 Hrs  T(C): 36.6 (13 Jan 2022 11:42), Max: 37.2 (13 Jan 2022 00:30)  T(F): 97.8 (13 Jan 2022 11:42), Max: 98.9 (13 Jan 2022 00:30)  HR: 69 (13 Jan 2022 11:42) (69 - 79)  BP: 132/64 (13 Jan 2022 11:42) (104/68 - 143/65)  BP(mean): 84 (12 Jan 2022 17:04) (80 - 84)  RR: 18 (13 Jan 2022 11:42) (18 - 20)  SpO2: 90% (13 Jan 2022 11:42) (90% - 96%)    PHYSICAL EXAMINATION:  GENERAL: NAD, well built  HEAD:  Atraumatic, Normocephalic  EYES:  conjunctiva and sclera clear  NECK: Supple, No JVD  CHEST/LUNG: Clear to auscultation. No rales, rhonchi, wheezing, or rubs  HEART: Regular rate and rhythm; No murmurs, rubs, or gallops  ABDOMEN: Soft, Nontender, Nondistended; Bowel sounds present  NERVOUS SYSTEM:  Alert & Oriented X 2-3  EXTREMITIES:  2+ Peripheral Pulses, No clubbing, cyanosis, or edema  SKIN: warm dry                          12.4   7.85  )-----------( 565      ( 13 Jan 2022 07:45 )             38.0     01-13    142  |  108  |  12  ----------------------------<  84  3.6   |  28  |  0.83    Ca    8.5      13 Jan 2022 07:45  Phos  2.4     01-12  Mg     2.5     01-12    TPro  6.7  /  Alb  2.2<L>  /  TBili  0.9  /  DBili  x   /  AST  23  /  ALT  22  /  AlkPhos  47  01-13    LIVER FUNCTIONS - ( 13 Jan 2022 07:45 )  Alb: 2.2 g/dL / Pro: 6.7 g/dL / ALK PHOS: 47 U/L / ALT: 22 U/L DA / AST: 23 U/L / GGT: x               PT/INR - ( 12 Jan 2022 07:06 )   PT: 13.2 sec;   INR: 1.12 ratio    PTT - ( 12 Jan 2022 07:06 )  PTT:28.1 sec        RADIOLOGY & ADDITIONAL TESTS:

## 2022-01-13 NOTE — CHART NOTE - NSCHARTNOTEFT_GEN_A_CORE
Pt. was transferred from  to [facility].  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  Discussed with attending, no need for Abx at this time in setting of asymptomatic presentation with no signs of infection.

## 2022-01-13 NOTE — PROGRESS NOTE ADULT - PROBLEM SELECTOR PLAN 4
ddx: hypothyroidism vs seizure vs COVID encephalopathy   plan as above found to be COVID-19 pos  no pulmonary sxs noted  will monitor SpO2 currently saturating >92%, no respiratory distress  c/w remdesivir   monitor SpO2

## 2022-01-13 NOTE — PROGRESS NOTE ADULT - PROBLEM SELECTOR PLAN 2
h/o hypothyroidism, on no medications at home  TSH elevated 45.20  Low Free T3 1.17, low Free T4 0.3  c/w Levothyroxine 50mcg  Endocrinology Dr. Corona consulted, will see patient tomorrow

## 2022-01-13 NOTE — PROGRESS NOTE ADULT - PROBLEM SELECTOR PLAN 3
found to be COVID-19 pos  no pulmonary sxs noted  will monitor SpO2 currently saturating 90%, no respiratory distress  started on remdesivir   monitor SpO2 found to be COVID-19 pos  no pulmonary sxs noted  will monitor SpO2 currently saturating >92%, no respiratory distress  c/w remdesivir   monitor SpO2 ddx: hypothyroidism vs seizure vs COVID encephalopathy   plan as above

## 2022-01-13 NOTE — PROGRESS NOTE ADULT - PROBLEM SELECTOR PLAN 1
P/w loss of consciousness at home, w/ possible shaking and post-ictal state; also AMS x2wks per daughter  - COVID pos  - lactate 0.8  - trop 19.6  -   EKG NSR. lateral T wave inversion  CTH unremarkable  TSH elevated 45.20, unknown if taking any thyroid meds at home  Low Free T3 1.17, low Free T4 0.3  c/w Levothyroxine 50mcg  ECHO no longer indicated as syncopal episode likely due to uncontrolled hypothyroidism  Endocrinology Dr. Corona consulted, will see patient tomorrow   f/u orthostatic BP P/w loss of consciousness at home, w/ possible shaking and post-ictal state; also AMS x2wks per daughter  - COVID pos  - lactate 0.8  - trop 19.6  -   EKG NSR. lateral T wave inversion  CTH unremarkable  TSH elevated 45.20, unknown if taking any thyroid meds at home  Low Free T3 1.17, low Free T4 0.3  c/w Levothyroxine 50mcg  ECHO limited due to COVID isolation and low suspicion for cardiac event  Endocrinology Dr. Corona consulted, will see patient tomorrow   f/u orthostatic BP

## 2022-01-14 LAB
ALBUMIN SERPL ELPH-MCNC: 2.3 G/DL — LOW (ref 3.5–5)
ALP SERPL-CCNC: 48 U/L — SIGNIFICANT CHANGE UP (ref 40–120)
ALT FLD-CCNC: 22 U/L DA — SIGNIFICANT CHANGE UP (ref 10–60)
ANION GAP SERPL CALC-SCNC: 8 MMOL/L — SIGNIFICANT CHANGE UP (ref 5–17)
AST SERPL-CCNC: 19 U/L — SIGNIFICANT CHANGE UP (ref 10–40)
BILIRUB SERPL-MCNC: 0.9 MG/DL — SIGNIFICANT CHANGE UP (ref 0.2–1.2)
BUN SERPL-MCNC: 11 MG/DL — SIGNIFICANT CHANGE UP (ref 7–18)
CALCIUM SERPL-MCNC: 9 MG/DL — SIGNIFICANT CHANGE UP (ref 8.4–10.5)
CHLORIDE SERPL-SCNC: 109 MMOL/L — HIGH (ref 96–108)
CO2 SERPL-SCNC: 25 MMOL/L — SIGNIFICANT CHANGE UP (ref 22–31)
CREAT SERPL-MCNC: 0.75 MG/DL — SIGNIFICANT CHANGE UP (ref 0.5–1.3)
GLUCOSE SERPL-MCNC: 91 MG/DL — SIGNIFICANT CHANGE UP (ref 70–99)
HCT VFR BLD CALC: 39.5 % — SIGNIFICANT CHANGE UP (ref 34.5–45)
HGB BLD-MCNC: 13 G/DL — SIGNIFICANT CHANGE UP (ref 11.5–15.5)
MCHC RBC-ENTMCNC: 29.9 PG — SIGNIFICANT CHANGE UP (ref 27–34)
MCHC RBC-ENTMCNC: 32.9 GM/DL — SIGNIFICANT CHANGE UP (ref 32–36)
MCV RBC AUTO: 90.8 FL — SIGNIFICANT CHANGE UP (ref 80–100)
MRSA PCR RESULT.: SIGNIFICANT CHANGE UP
NRBC # BLD: 0 /100 WBCS — SIGNIFICANT CHANGE UP (ref 0–0)
PLATELET # BLD AUTO: 544 K/UL — HIGH (ref 150–400)
POTASSIUM SERPL-MCNC: 3.6 MMOL/L — SIGNIFICANT CHANGE UP (ref 3.5–5.3)
POTASSIUM SERPL-SCNC: 3.6 MMOL/L — SIGNIFICANT CHANGE UP (ref 3.5–5.3)
PROT SERPL-MCNC: 6.6 G/DL — SIGNIFICANT CHANGE UP (ref 6–8.3)
RBC # BLD: 4.35 M/UL — SIGNIFICANT CHANGE UP (ref 3.8–5.2)
RBC # FLD: 13.8 % — SIGNIFICANT CHANGE UP (ref 10.3–14.5)
S AUREUS DNA NOSE QL NAA+PROBE: SIGNIFICANT CHANGE UP
SODIUM SERPL-SCNC: 142 MMOL/L — SIGNIFICANT CHANGE UP (ref 135–145)
WBC # BLD: 8.64 K/UL — SIGNIFICANT CHANGE UP (ref 3.8–10.5)
WBC # FLD AUTO: 8.64 K/UL — SIGNIFICANT CHANGE UP (ref 3.8–10.5)

## 2022-01-14 PROCEDURE — 99232 SBSQ HOSP IP/OBS MODERATE 35: CPT | Mod: GC

## 2022-01-14 RX ADMIN — REMDESIVIR 500 MILLIGRAM(S): 5 INJECTION INTRAVENOUS at 20:10

## 2022-01-14 RX ADMIN — Medication 50 MICROGRAM(S): at 06:10

## 2022-01-14 RX ADMIN — ENOXAPARIN SODIUM 40 MILLIGRAM(S): 100 INJECTION SUBCUTANEOUS at 11:39

## 2022-01-14 NOTE — PROGRESS NOTE ADULT - NUTRITIONAL ASSESSMENT
Diet, DASH/TLC:   Sodium & Cholesterol Restricted (01-11-22 @ 06:50) [Active]

## 2022-01-14 NOTE — CONSULT NOTE ADULT - SUBJECTIVE AND OBJECTIVE BOX
Patient is a 76y old  Female who presents with a chief complaint of syncope (13 Jan 2022 12:49)      HPI:  Patient is a 76yoF, AAOx2 and minimally ambulatory at baseline, w/ known PMH of sarcoidosis and hypothyroidism (non-complaint w/ medications), presents with loss of unconsciousness. Patient is a poor historian; thus, information obtained from the daughter, Mrs. Westbrook. Patient was noted to be breathing heavy, and suddently fell to a chair. She was also unresponsive during the event. Patient does not remember the events. She complains of sharp, intermittent, 3/10, non-radiating, b/l leg pain x2wk. She denies fever, chills, n/v, chest pain, SOB, abdominal pain, urinary or bowel movement changes.    According to the daughter, patient started to develop altered mental status since last week. She was taken to the [facility] for that reason, but was discharged the next day. Patient was intermittently unable to operate phone or TV, and noted to speak in "jibberish. She was also seen looking for a person, who reportedly already passed away.   Found to have uncont hypothyroidism. Pt is unable to give hx.        PAST MEDICAL & SURGICAL HISTORY:         MEDICATIONS  (STANDING):  enoxaparin Injectable 40 milliGRAM(s) SubCutaneous daily  levothyroxine 50 MICROGram(s) Oral daily  remdesivir  IVPB   IV Intermittent   remdesivir  IVPB 100 milliGRAM(s) IV Intermittent every 24 hours    MEDICATIONS  (PRN):      FAMILY HISTORY:      SOCIAL HISTORY:      REVIEW OF SYSTEMS:  	        Vital Signs Last 24 Hrs  T(C): 36.5 (14 Jan 2022 13:11), Max: 36.6 (13 Jan 2022 14:49)  T(F): 97.7 (14 Jan 2022 13:11), Max: 97.8 (13 Jan 2022 14:49)  HR: 84 (14 Jan 2022 13:11) (69 - 84)  BP: 136/70 (14 Jan 2022 13:11) (135/66 - 145/69)  BP(mean): --  RR: 18 (14 Jan 2022 13:11) (17 - 18)  SpO2: 95% (14 Jan 2022 13:11) (93% - 97%)      Constitutional:    HEENT: nad    Neck:  No JVD, bruits or thyromegaly    Respiratory:  Clear without rales or rhonchi    Cardiovascular:  RR without murmur, rub or gallop.    Gastrointestinal: Soft without hepatosplenomegaly.    Extremities: without cyanosis, clubbing or edema.    Neurological:  Oriented   x 1     . No gross sensory or motor defects.        LABS:                        13.0   8.64  )-----------( 544      ( 14 Jan 2022 09:20 )             39.5     01-14    142  |  109<H>  |  11  ----------------------------<  91  3.6   |  25  |  0.75    Ca    9.0      14 Jan 2022 09:20    TPro  6.6  /  Alb  2.3<L>  /  TBili  0.9  /  DBili  x   /  AST  19  /  ALT  22  /  AlkPhos  48  01-14      Thyroid Stimulating Hormone, Serum (01.11.22 @ 06:32)    Thyroid Stimulating Hormone, Serum: 45.20 uU/mL    Free Thyroxine, Serum in AM (01.11.22 @ 21:46)    Free Thyroxine, Serum: 0.3 ng/dL          CAPILLARY BLOOD GLUCOSE          RADIOLOGY & ADDITIONAL STUDIES:

## 2022-01-14 NOTE — PROGRESS NOTE ADULT - PROBLEM SELECTOR PLAN 4
found to be COVID-19 pos  no pulmonary sxs noted  will monitor SpO2 currently saturating >92%, no respiratory distress  c/w remdesivir   monitor SpO2

## 2022-01-14 NOTE — CONSULT NOTE ADULT - PROBLEM SELECTOR RECOMMENDATION 9
due to noncompliance  cont lt4 50 mcg  titrate lt4 in 4 wks  tfts as out pt due to noncompliance  cont lt4 50 mcg  titrate lt4 in 4 wks  tfts as out pt  Dr. Corona following

## 2022-01-14 NOTE — PROGRESS NOTE ADULT - PROBLEM SELECTOR PLAN 2
h/o hypothyroidism, on no medications at home  TSH elevated 45.20  Low Free T3 1.17, low Free T4 0.3  c/w Levothyroxine 50mcg  titrate lt4 in 4 wks  Dr. Corona following  tfts as out pt.

## 2022-01-14 NOTE — PROGRESS NOTE ADULT - SUBJECTIVE AND OBJECTIVE BOX
HPI:  Patient is a 76yoF, AAOx2 and minimally ambulatory at baseline, w/ known PMH of sarcoidosis and hypothyroidism (non-complaint w/ medications), presents with loss of unconsciousness. Patient is a poor historian; thus, information obtained from the daughter, Mrs. Westbrook. Patient was noted to be breathing heavy, and suddently fell to a chair. She was also unresponsive during the event. Patient does not remember the events. She complains of sharp, intermittent, 3/10, non-radiating, b/l leg pain x2wk. She denies fever, chills, n/v, chest pain, SOB, abdominal pain, urinary or bowel movement changes.    According to the daughter, patient started to develop altered mental status since last week. She was taken to the [facility] for that reason, but was discharged the next day. Patient was intermittently unable to operate phone or TV, and noted to speak in "jibberish. She was also seen looking for a person, who reportedly already passed away.  (10 Ryan 2022 22:18)      Patient is a 76y old  Female who presents with a chief complaint of syncope (14 Jan 2022 13:30)  Patient seen, NAD.      INTERVAL HPI/OVERNIGHT EVENTS:  T(C): 36.5 (01-14-22 @ 13:11), Max: 36.5 (01-14-22 @ 13:11)  HR: 84 (01-14-22 @ 13:11) (72 - 84)  BP: 136/70 (01-14-22 @ 13:11) (135/66 - 140/80)  RR: 18 (01-14-22 @ 13:11) (17 - 18)  SpO2: 95% (01-14-22 @ 13:11) (93% - 97%)  Wt(kg): --  I&O's Summary      REVIEW OF SYSTEMS: denies fever, chills, SOB, palpitations, chest pain, abdominal pain, nausea, vomiting, diarrhea, constipation, dizziness    MEDICATIONS  (STANDING):  enoxaparin Injectable 40 milliGRAM(s) SubCutaneous daily  levothyroxine 50 MICROGram(s) Oral daily  remdesivir  IVPB   IV Intermittent   remdesivir  IVPB 100 milliGRAM(s) IV Intermittent every 24 hours    MEDICATIONS  (PRN):      PHYSICAL EXAM:  GENERAL: NAD  HEAD:  Atraumatic  EYES: EOMI  NECK: Supple  CHEST/LUNG: Clear to auscultation bilaterally  HEART: Regular rate and rhythm; No murmurs, rubs, or gallops  ABDOMEN: Soft, Nontender, Nondistended  EXTREMITIES: no edema  LYMPH: No lymphadenopathy noted  SKIN: No rashes or lesions  LABS:                        13.0   8.64  )-----------( 544      ( 14 Jan 2022 09:20 )             39.5     01-14    142  |  109<H>  |  11  ----------------------------<  91  3.6   |  25  |  0.75    Ca    9.0      14 Jan 2022 09:20    TPro  6.6  /  Alb  2.3<L>  /  TBili  0.9  /  DBili  x   /  AST  19  /  ALT  22  /  AlkPhos  48  01-14        CAPILLARY BLOOD GLUCOSE

## 2022-01-14 NOTE — PROGRESS NOTE ADULT - PROBLEM SELECTOR PLAN 1
P/w loss of consciousness at home, w/ possible shaking and post-ictal state; also AMS x2wks per daughter  - COVID pos 1/10  - lactate 0.8  - trop 19.6  -   EKG NSR. lateral T wave inversion  CTH unremarkable  TSH elevated 45.20, unknown if taking any thyroid meds at home  Low Free T3 1.17, low Free T4 0.3  c/w Levothyroxine 50mcg  ECHO limited due to COVID isolation and low suspicion for cardiac event  Endocrinology Dr. Corona consulted, will see patient tomorrow   f/u orthostatic BP P/w loss of consciousness at home, w/ possible shaking and post-ictal state; also AMS x2wks per daughter  - COVID pos 1/10  - lactate 0.8  - trop 19.6  -   EKG NSR. lateral T wave inversion  CTH unremarkable  TSH elevated 45.20, unknown if taking any thyroid meds at home  Low Free T3 1.17, low Free T4 0.3  c/w Levothyroxine 50mcg  ECHO limited due to COVID isolation and low suspicion for cardiac event  Endocrinology Dr. Corona consult appreciated.  f/u orthostatic BP

## 2022-01-14 NOTE — CONSULT NOTE ADULT - ASSESSMENT
Patient is a 76yoF, AAOx2 and minimally ambulatory at baseline, w/ known PMH of sarcoidosis and hypothyroidism (non-complaint w/ medications), presents with loss of unconsciousness.   Found to have uncont hypothyroidism. Pt is unable to give hx.

## 2022-01-15 LAB
CULTURE RESULTS: SIGNIFICANT CHANGE UP
CULTURE RESULTS: SIGNIFICANT CHANGE UP
SPECIMEN SOURCE: SIGNIFICANT CHANGE UP
SPECIMEN SOURCE: SIGNIFICANT CHANGE UP

## 2022-01-15 PROCEDURE — 99233 SBSQ HOSP IP/OBS HIGH 50: CPT

## 2022-01-15 RX ADMIN — Medication 50 MICROGRAM(S): at 05:24

## 2022-01-15 RX ADMIN — REMDESIVIR 500 MILLIGRAM(S): 5 INJECTION INTRAVENOUS at 23:16

## 2022-01-15 NOTE — DISCHARGE NOTE PROVIDER - HOSPITAL COURSE
Patient is a 76yoF, AAOx2 and minimally ambulatory at baseline, w/ known PMH of sarcoidosis and hypothyroidism (non-complaint w/ medications), presents with loss of consciousness. Pt was admitted to [facility] x 1 day for same event. Daughter also reports hallucination. Pt was admitted to tele for syncope w/u. Syncope likely due to metabolic encephalopathy 2/2 COVID +uncontrolled hypothyroidism or seizure.   Abnormal EKG in ED with TWI in multiple leads, no event on tele, troponin negative x 2. d/hilary tele then down graded to general medicine for further mgmt.     pt with h/o hypothyroidism, on no medications at home, noted to have elevated TSH 45.20, low Free T3 and T4, endo Dr. Corona consulted, starting levothyroxine 50mcg, advised to have f/u TFTs in 4 weeks as outpt   Critical value notification received for  Urine Culture 50,000-99,000 Pseudomonas Auregenosa, resistant to carbapenem.  Discussed with attending, no need for Abx at this time in setting of asymptomatic presentation with no signs of infection.  		  PT -EILEEN    incomplete Patient is a 76yoF, AAOx2 and minimally ambulatory at baseline, w/ known PMH of sarcoidosis and hypothyroidism (non-complaint w/ medications), presents with loss of consciousness. Pt was admitted to [facility] x 1 day for same event. Daughter also reports hallucination. Pt was admitted to tele for syncope w/u. Syncope likely due to metabolic encephalopathy 2/2 COVID +uncontrolled hypothyroidism or seizure.   Abnormal EKG in ED with TWI in multiple leads, no event on tele, troponin negative x 2. d/hilary tele then down graded to general medicine for further mgmt.     pt with h/o hypothyroidism, on no medications at home, noted to have elevated TSH 45.20, low Free T3 and T4, endo Dr. Corona consulted, starting levothyroxine 50mcg, advised to have f/u TFTs in 4 weeks as outpt   Critical value notification received for  Urine Culture 50,000-99,000 Pseudomonas Auregenosa, resistant to carbapenem.  Discussed with attending, no need for Abx at this time in setting of asymptomatic presentation with no signs of infection.  		  Saturating well on RA, completed remdesivir course without complication. Latest COVID negative on 1/20/2022, will be d/hilary to next level of care.

## 2022-01-15 NOTE — DISCHARGE NOTE PROVIDER - ATTENDING DISCHARGE PHYSICAL EXAMINATION:
77yo F PMHx of hypothyroidism (not taking medications) who presented to the hospital after a syncopal episode. Daughter reports patient was breathing heavily and then slumped in her chair. Patient was unresponsive for a few minutes and then awoke and was at her baseline. Patient has been having a general mental decline over the past year that has progressed over the last few weeks. Patient stopped following up with doctors and taking medications for at least 4-5 years after the passing of her . Labs show elevated TSH. Check Free T4, T3. Start levothyroxine 50mcg daily. Endocrine consult. Patient likely has dementia, but may have exacerbation from hypothyroidism vs. COVID. Troponin negative x2, no events on telemetry. Syncope likely related to COVID infection or orthostatic hypotension. Stable for discharge to [facility].    PHYSICAL EXAM:  GENERAL: NAD  HEAD:  Atraumatic  EYES: EOMI  NECK: Supple  CHEST/LUNG: Clear to auscultation bilaterally  HEART: Regular rate and rhythm; No murmurs, rubs, or gallops  ABDOMEN: Soft, Nontender, Nondistended  EXTREMITIES: no edema  LYMPH: No lymphadenopathy noted  SKIN: No rashes or lesions

## 2022-01-15 NOTE — DISCHARGE NOTE PROVIDER - NSDCCPCAREPLAN_GEN_ALL_CORE_FT
PRINCIPAL DISCHARGE DIAGNOSIS  Diagnosis: Acute encephalopathy  Assessment and Plan of Treatment: resolved  liekly due to uncontrolled hypothyroidisim and COVID infection status.         SECONDARY DISCHARGE DIAGNOSES  Diagnosis: Hypothyroidism  Assessment and Plan of Treatment: your thyroids function hormone result was  high >40 during this admission. Started on levothyroxine per endocrinologist. continue medication as prescribed every day with empty stomach and should be 4 hours apart from calcium/multivitamin.   You were followed closely by endocrinologist in hospital.   You will need close follow up with your outpatient PCP or Endocrinologist for Repeat continual thyroid function testing  in 4 weeks and further medication management      Diagnosis: COVID  Assessment and Plan of Treatment: completed remdesivir course during this admission   not requiring supplemental oxygen   No longer needed to be isolation as you completed the quarantine periods in the hospital and latest COVID PCR is negative 1/20/22

## 2022-01-15 NOTE — PROGRESS NOTE ADULT - SUBJECTIVE AND OBJECTIVE BOX
[facility] Medicine  Patient is a 76y old  Female who presents with a chief complaint of syncope (15 Ryan 2022 14:57)      SUBJECTIVE / OVERNIGHT EVENTS:  No acute events over night. Patient is tolerating RA. Denies any fevers/chills, headache, CP, SOB, abd pain, N/V/D, constipation, or leg swelling.       MEDICATIONS  (STANDING):  enoxaparin Injectable 40 milliGRAM(s) SubCutaneous daily  levothyroxine 50 MICROGram(s) Oral daily  remdesivir  IVPB   IV Intermittent   remdesivir  IVPB 100 milliGRAM(s) IV Intermittent every 24 hours    MEDICATIONS  (PRN):          OBJECTIVE:  Vital Signs Last 24 Hrs  T(C): 36.4 (15 Ryan 2022 13:00), Max: 36.8 (14 Jan 2022 20:53)  T(F): 97.6 (15 Ryan 2022 13:00), Max: 98.3 (14 Jan 2022 20:53)  HR: 72 (15 Ryan 2022 13:00) (72 - 79)  BP: 148/81 (15 Ryan 2022 13:00) (109/73 - 148/81)  BP(mean): --  RR: 18 (15 Ryan 2022 13:00) (17 - 18)  SpO2: 97% (15 Ryan 2022 13:00) (93% - 97%)    PHYSICAL EXAM:  GENERAL: NAD  HEAD:  Atraumatic  EYES: EOMI  NECK: Supple  CHEST/LUNG: Clear to auscultation bilaterally  HEART: Regular rate and rhythm; No murmurs, rubs, or gallops  ABDOMEN: Soft, Nontender, Nondistended  EXTREMITIES: no edema  LYMPH: No lymphadenopathy noted  SKIN: No rashes or lesions    CAPILLARY BLOOD GLUCOSE        I&O's Summary    14 Jan 2022 07:01  -  15 Ryan 2022 07:00  --------------------------------------------------------  IN: 0 mL / OUT: 600 mL / NET: -600 mL              LABS:                        13.0   8.64  )-----------( 544      ( 14 Jan 2022 09:20 )             39.5     01-14    142  |  109<H>  |  11  ----------------------------<  91  3.6   |  25  |  0.75    Ca    9.0      14 Jan 2022 09:20    TPro  6.6  /  Alb  2.3<L>  /  TBili  0.9  /  DBili  x   /  AST  19  /  ALT  22  /  AlkPhos  48  01-14                  RADIOLOGY & ADDITIONAL TESTS:

## 2022-01-15 NOTE — DISCHARGE NOTE PROVIDER - CARE PROVIDER_API CALL
While you stay in short term rehab, follow up with PMD at the facility then once you discharge from rehab, go back to your PMD  Phone: (   )    -  Fax: (   )    -  Follow Up Time:

## 2022-01-15 NOTE — DISCHARGE NOTE PROVIDER - PROVIDER TOKENS
FREE:[LAST:[While you stay in short term rehab],FIRST:[follow up with PMD at the facility then once you discharge from rehab, go back to your PMD],PHONE:[(   )    -],FAX:[(   )    -]]

## 2022-01-16 PROCEDURE — 99232 SBSQ HOSP IP/OBS MODERATE 35: CPT

## 2022-01-16 NOTE — DIETITIAN INITIAL EVALUATION ADULT. - PERTINENT LABORATORY DATA
01-14 Na142 mmol/L Glu 91 mg/dL K+ 3.6 mmol/L Cr  0.75 mg/dL BUN 11 mg/dL   01-12 Phos 2.4 mg/dL<L>   01-14 Alb 2.3 g/dL<L>       01-11 Chol 178 mg/dL LDL --    HDL 43 mg/dL<L> Trig 94 mg/dL  01-11-22 @ 09:25 HgbA1C 6.0 [4.0 - 5.6]

## 2022-01-16 NOTE — PROGRESS NOTE ADULT - SUBJECTIVE AND OBJECTIVE BOX
[facility] Medicine  Patient is a 76y old  Female who presents with a chief complaint of syncope (15 Ryan 2022 14:57)      SUBJECTIVE / OVERNIGHT EVENTS:  No acute events over night. Patient is tolerating RA. Denies any fevers/chills, headache, CP, SOB, abd pain, N/V/D, constipation, or leg swelling.       MEDICATIONS  (STANDING):  enoxaparin Injectable 40 milliGRAM(s) SubCutaneous daily  levothyroxine 50 MICROGram(s) Oral daily  remdesivir  IVPB   IV Intermittent   remdesivir  IVPB 100 milliGRAM(s) IV Intermittent every 24 hours    MEDICATIONS  (PRN):          OBJECTIVE:  Vital Signs Last 24 Hrs  T(C): 36.4 (15 Ryan 2022 13:00), Max: 36.8 (14 Jan 2022 20:53)  T(F): 97.6 (15 Ryan 2022 13:00), Max: 98.3 (14 Jan 2022 20:53)  HR: 72 (15 Ryan 2022 13:00) (72 - 79)  BP: 148/81 (15 Ryan 2022 13:00) (109/73 - 148/81)  BP(mean): --  RR: 18 (15 Ryan 2022 13:00) (17 - 18)  SpO2: 97% (15 Ryan 2022 13:00) (93% - 97%)    PHYSICAL EXAM:  GENERAL: NAD  HEAD:  Atraumatic  EYES: EOMI  NECK: Supple  CHEST/LUNG: Clear to auscultation bilaterally  HEART: Regular rate and rhythm; No murmurs, rubs, or gallops  ABDOMEN: Soft, Nontender, Nondistended  EXTREMITIES: no edema  LYMPH: No lymphadenopathy noted  SKIN: No rashes or lesions    CAPILLARY BLOOD GLUCOSE        I&O's Summary    14 Jan 2022 07:01  -  15 Ryan 2022 07:00  --------------------------------------------------------  IN: 0 mL / OUT: 600 mL / NET: -600 mL              LABS:                        13.0   8.64  )-----------( 544      ( 14 Jan 2022 09:20 )             39.5     01-14    142  |  109<H>  |  11  ----------------------------<  91  3.6   |  25  |  0.75    Ca    9.0      14 Jan 2022 09:20    TPro  6.6  /  Alb  2.3<L>  /  TBili  0.9  /  DBili  x   /  AST  19  /  ALT  22  /  AlkPhos  48  01-14                  RADIOLOGY & ADDITIONAL TESTS:

## 2022-01-16 NOTE — DIETITIAN INITIAL EVALUATION ADULT. - OTHER INFO
Pt is On Airborne isolation. Pt With COVI D+.  Observed Pt via Glass Door Pt  On face time. Pt seen for LOS. D/W RN Pt eats < 50 % of meal. S/P endo consult Noted .

## 2022-01-16 NOTE — DIETITIAN INITIAL EVALUATION ADULT. - PROBLEM SELECTOR PROBLEM 1
- Uncertain exact cause of nausea/vomiting  - Possibly secondary to medication side effects from his hospitalization  - PRN zofran and PRN phenergan ordered  - Encouraged PO intake as tolerated     Brief loss of consciousness

## 2022-01-17 LAB — SARS-COV-2 RNA SPEC QL NAA+PROBE: DETECTED

## 2022-01-17 PROCEDURE — 99232 SBSQ HOSP IP/OBS MODERATE 35: CPT

## 2022-01-17 RX ADMIN — ENOXAPARIN SODIUM 40 MILLIGRAM(S): 100 INJECTION SUBCUTANEOUS at 12:39

## 2022-01-17 RX ADMIN — Medication 50 MICROGRAM(S): at 05:47

## 2022-01-17 NOTE — PROGRESS NOTE ADULT - SUBJECTIVE AND OBJECTIVE BOX
[facility] Medicine  Patient is a 76y old  Female who presents with a chief complaint of syncope (15 Ryan 2022 14:57)      SUBJECTIVE / OVERNIGHT EVENTS:  No acute events over night. Patient is tolerating RA. Denies any fevers/chills, headache, CP, SOB, abd pain, N/V/D, constipation, or leg swelling.       MEDICATIONS  (STANDING):  enoxaparin Injectable 40 milliGRAM(s) SubCutaneous daily  levothyroxine 50 MICROGram(s) Oral daily  remdesivir  IVPB   IV Intermittent   remdesivir  IVPB 100 milliGRAM(s) IV Intermittent every 24 hours    MEDICATIONS  (PRN):          OBJECTIVE:  Vital Signs Last 24 Hrs  T(C): 36.4 (15 Ryan 2022 13:00), Max: 36.8 (14 Jan 2022 20:53)  T(F): 97.6 (15 Ryan 2022 13:00), Max: 98.3 (14 Jan 2022 20:53)  HR: 72 (15 Ryan 2022 13:00) (72 - 79)  BP: 148/81 (15 Ryan 2022 13:00) (109/73 - 148/81)  BP(mean): --  RR: 18 (15 Ryan 2022 13:00) (17 - 18)  SpO2: 97% (15 Ryan 2022 13:00) (93% - 97%)    PHYSICAL EXAM:  GENERAL: NAD  HEAD:  Atraumatic  EYES: EOMI  NECK: Supple  CHEST/LUNG: Clear to auscultation bilaterally  HEART: Regular rate and rhythm; No murmurs, rubs, or gallops  ABDOMEN: Soft, Nontender, Nondistended  EXTREMITIES: no edema  LYMPH: No lymphadenopathy noted  SKIN: No rashes or lesions    CAPILLARY BLOOD GLUCOSE        I&O's Summary    14 Jan 2022 07:01  -  15 Ryan 2022 07:00  --------------------------------------------------------  IN: 0 mL / OUT: 600 mL / NET: -600 mL              LABS:                        13.0   8.64  )-----------( 544      ( 14 Jan 2022 09:20 )             39.5     01-14    142  |  109<H>  |  11  ----------------------------<  91  3.6   |  25  |  0.75    Ca    9.0      14 Jan 2022 09:20    TPro  6.6  /  Alb  2.3<L>  /  TBili  0.9  /  DBili  x   /  AST  19  /  ALT  22  /  AlkPhos  48  01-14                  RADIOLOGY & ADDITIONAL TESTS:

## 2022-01-18 DIAGNOSIS — R55 SYNCOPE AND COLLAPSE: ICD-10-CM

## 2022-01-18 DIAGNOSIS — Z02.9 ENCOUNTER FOR ADMINISTRATIVE EXAMINATIONS, UNSPECIFIED: ICD-10-CM

## 2022-01-18 LAB — GLUCOSE BLDC GLUCOMTR-MCNC: 148 MG/DL — HIGH (ref 70–99)

## 2022-01-18 PROCEDURE — 99232 SBSQ HOSP IP/OBS MODERATE 35: CPT

## 2022-01-18 RX ADMIN — ENOXAPARIN SODIUM 40 MILLIGRAM(S): 100 INJECTION SUBCUTANEOUS at 11:27

## 2022-01-18 RX ADMIN — Medication 50 MICROGRAM(S): at 05:36

## 2022-01-18 NOTE — PROGRESS NOTE ADULT - PROBLEM SELECTOR PLAN 2
pt p/w syncope likely due to covid infection vs orthostatic hypotension  -s/p 1 liter NS  -EKG NSR  -troponin x2 negative  -f/u orthostatics

## 2022-01-18 NOTE — PROGRESS NOTE ADULT - PROBLEM SELECTOR PLAN 1
pt tested postive for COVID 19 on 1/10  currently on room air  -CXR: bilateral infiltrates  -s/p remdesevir  -repeat covid test on 1/17 still (+)  -continue airborne isolation

## 2022-01-18 NOTE — PROGRESS NOTE ADULT - PROBLEM SELECTOR PLAN 4
pt p/w metabolic encephalopathy likely due to dementia  -CT Head: no acute findings  -electrolytes normal  -blood cultures negative  -urine culture grew pseudomonas aeruginosa, pt is asymptomatic, no abx needed

## 2022-01-18 NOTE — PROGRESS NOTE ADULT - SUBJECTIVE AND OBJECTIVE BOX
[facility] Medicine  Patient is a 76y old  Female who presents with a chief complaint of syncope (15 Ryan 2022 14:57)      SUBJECTIVE / OVERNIGHT EVENTS:  No acute events over night. Patient is tolerating RA. Denies any fevers/chills, headache, CP, SOB, abd pain, N/V/D, constipation, or leg swelling.       MEDICATIONS  (STANDING):  enoxaparin Injectable 40 milliGRAM(s) SubCutaneous daily  levothyroxine 50 MICROGram(s) Oral daily  remdesivir  IVPB   IV Intermittent   remdesivir  IVPB 100 milliGRAM(s) IV Intermittent every 24 hours    MEDICATIONS  (PRN):          OBJECTIVE:  Vital Signs Last 24 Hrs  T(C): 36.4 (15 Ryan 2022 13:00), Max: 36.8 (14 Jan 2022 20:53)  T(F): 97.6 (15 Ryan 2022 13:00), Max: 98.3 (14 Jan 2022 20:53)  HR: 72 (15 Ryan 2022 13:00) (72 - 79)  BP: 148/81 (15 Ryan 2022 13:00) (109/73 - 148/81)  BP(mean): --  RR: 18 (15 Ryan 2022 13:00) (17 - 18)  SpO2: 97% (15 Ryan 2022 13:00) (93% - 97%)    PHYSICAL EXAM:  GENERAL: NAD  HEAD:  Atraumatic  EYES: EOMI  NECK: Supple  CHEST/LUNG: Clear to auscultation bilaterally  HEART: Regular rate and rhythm; No murmurs, rubs, or gallops  ABDOMEN: Soft, Nontender, Nondistended  EXTREMITIES: no edema  LYMPH: No lymphadenopathy noted  SKIN: No rashes or lesions    CAPILLARY BLOOD GLUCOSE        I&O's Summary    14 Jan 2022 07:01  -  15 Ryan 2022 07:00  --------------------------------------------------------  IN: 0 mL / OUT: 600 mL / NET: -600 mL              LABS:                        13.0   8.64  )-----------( 544      ( 14 Jan 2022 09:20 )             39.5     01-14    142  |  109<H>  |  11  ----------------------------<  91  3.6   |  25  |  0.75    Ca    9.0      14 Jan 2022 09:20    TPro  6.6  /  Alb  2.3<L>  /  TBili  0.9  /  DBili  x   /  AST  19  /  ALT  22  /  AlkPhos  48  01-14                  RADIOLOGY & ADDITIONAL TESTS:

## 2022-01-18 NOTE — PROGRESS NOTE ADULT - PROBLEM SELECTOR PLAN 5
VTE RISK FACTOR SCORE greater than or equal to 2 = PPX  Plan: RISK                                                Points  [] Previous VTE                                           3  [] Thrombophilia                                       2  [] Lower limb paralysis                              2   [] Current Cancer                                       2   [x] Immobilization > 24 hrs                        1  [] ICU/CCU stay > 24 hours                      1  [x] Age > 60                                                  1    -DVT: continue lovenox  -GI: tolerating PO

## 2022-01-18 NOTE — PROGRESS NOTE ADULT - PROBLEM SELECTOR PLAN 3
hx of hypothyroidism  -Labs show elevated TSH, both t3 and t4 low  -continue levothyroxine 50mcg daily.   -Endocrine Dr. Corona following

## 2022-01-18 NOTE — PROGRESS NOTE ADULT - SUBJECTIVE AND OBJECTIVE BOX
Patient is a 76y old  Female who presents with a chief complaint of syncope (18 Jan 2022 13:20)    OVERNIGHT EVENTS: no acute changes    REVIEW OF SYSTEMS:  CONSTITUTIONAL: No fever, chills  NECK: No pain or stiffness  RESPIRATORY: No cough, SOB  CARDIOVASCULAR: No chest pain, palpitations  GASTROINTESTINAL: No abdominal pain. No nausea, vomiting, or diarrhea  GENITOURINARY: No dysuria  NEUROLOGICAL: No HA  SKIN: No itching, burning, rashes, or lesions   MUSCULOSKELETAL: No joint pain or swelling; No muscle, back, or extremity pain    T(C): 36.6 (01-18-22 @ 12:07), Max: 36.6 (01-18-22 @ 04:55)  HR: 82 (01-18-22 @ 13:28) (74 - 82)  BP: 129/70 (01-18-22 @ 13:28) (102/60 - 129/70)  RR: 18 (01-18-22 @ 12:07) (18 - 18)  SpO2: 94% (01-18-22 @ 13:28) (94% - 95%)  Wt(kg): --Vital Signs Last 24 Hrs  T(C): 36.6 (18 Jan 2022 12:07), Max: 36.6 (18 Jan 2022 04:55)  T(F): 97.9 (18 Jan 2022 12:07), Max: 97.9 (18 Jan 2022 04:55)  HR: 82 (18 Jan 2022 13:28) (74 - 82)  BP: 129/70 (18 Jan 2022 13:28) (102/60 - 129/70)  BP(mean): --  RR: 18 (18 Jan 2022 12:07) (18 - 18)  SpO2: 94% (18 Jan 2022 13:28) (94% - 95%)    MEDICATIONS  (STANDING):  enoxaparin Injectable 40 milliGRAM(s) SubCutaneous daily  levothyroxine 50 MICROGram(s) Oral daily    MEDICATIONS  (PRN):  guaiFENesin Oral Liquid (Sugar-Free) 100 milliGRAM(s) Oral every 6 hours PRN Cough    PHYSICAL EXAM:  GENERAL: overweight, NAD  EYES: clear conjunctiva  ENMT: Moist mucous membranes  NECK: Supple, No JVD  CHEST/LUNG: +crackles bilaterally  HEART: S1, S2, Regular rate and rhythm  ABDOMEN: Soft, Nontender, Nondistended; Bowel sounds present  NEURO: Alert & Oriented X2 (name, place)  EXTREMITIES: No LE edema, no calf tenderness  SKIN: No rashes or lesions    LABS:    CAPILLARY BLOOD GLUCOSE  POCT Blood Glucose.: 148 mg/dL (18 Jan 2022 11:32)    RADIOLOGY & ADDITIONAL TESTS:  < from: CT Head No Cont (01.10.22 @ 15:18) >  ACC: [id number] EXAM:  CT BRAIN                          PROCEDURE DATE:  01/10/2022          INTERPRETATION:  Clinical indications: Syncope.    Multiple axial sections were performed from base skull to vertex without   contrast enhancement. Coronal and sagittal destructions were performed as   well    This exam is somewhat limited by motion    Parenchymal volume loss and chronic microvascular ischemic changes are   identified.    Grossly, there is no acute hemorrhage mass or mass effect seen.    Evaluation of the osseous structures with the appropriate window appears   unremarkable    The visualized paranasal sinuses mastoid and middle ear regions appear   clear.    IMPRESSION: This exam is somewhat limited by motion. Grossly, no evidence   of acute hemorrhage mass poor mass effect is seen. If symptoms continue   MRI can be done for further evaluation if clinically indicated and if   there are no contraindications.    --- End of Report ---            APOLONIA FOSTER MD; Attending Radiologist  This document has been electronically signed. Ryan 10 2022  3:21PM    < end of copied text >  < from: Xray Chest 1 View-PORTABLE IMMEDIATE (01.10.22 @ 15:14) >  ACC: [id number] EXAM:  XR CHEST PORTABLE IMMED 1V                          PROCEDURE DATE:  01/10/2022          INTERPRETATION:  AP supine chest on January 10, 2022 at 2:49 PM. Patient   has sepsis.    COMPARISON: None available.    Heart magnified by technique.    Scattered mid lower lung field infiltrates are noted suggesting Covid   pneumonia.    Advanced bilateral shoulder degeneration seen.    IMPRESSION: Bilateral infiltrates as above.    --- End of Report ---            RITCHIE ESTEVEZ MD; Attending Radiologist  This document has been electronically signed. Ryan 10 2022  3:17PM    < end of copied text >      Imaging Personally Reviewed:  [ ] YES  [ ] NO

## 2022-01-19 PROCEDURE — 99232 SBSQ HOSP IP/OBS MODERATE 35: CPT

## 2022-01-19 RX ADMIN — ENOXAPARIN SODIUM 40 MILLIGRAM(S): 100 INJECTION SUBCUTANEOUS at 12:05

## 2022-01-19 RX ADMIN — Medication 50 MICROGRAM(S): at 06:00

## 2022-01-19 NOTE — PROGRESS NOTE ADULT - PROBLEM SELECTOR PLAN 2
pt p/w syncope likely due to covid infection vs orthostatic hypotension  -s/p 1 liter NS  -EKG NSR  -troponin x2 negative

## 2022-01-19 NOTE — PROGRESS NOTE ADULT - PROBLEM SELECTOR PLAN 1
pt tested postive for COVID 19 on 1/10  currently on room air  -CXR: bilateral infiltrates  -s/p remdesevir  -repeat covid test on 1/17 still (+)  -continue airborne isolation  -repeat COIVd pcr 1/20

## 2022-01-20 LAB — SARS-COV-2 RNA SPEC QL NAA+PROBE: SIGNIFICANT CHANGE UP

## 2022-01-20 PROCEDURE — 99232 SBSQ HOSP IP/OBS MODERATE 35: CPT

## 2022-01-20 RX ADMIN — ENOXAPARIN SODIUM 40 MILLIGRAM(S): 100 INJECTION SUBCUTANEOUS at 11:27

## 2022-01-20 RX ADMIN — Medication 50 MICROGRAM(S): at 06:20

## 2022-01-20 NOTE — PROGRESS NOTE ADULT - SUBJECTIVE AND OBJECTIVE BOX
NP Note discussed with  Primary Attending    INTERVAL HPI/OVERNIGHT EVENTS: no new complaints    MEDICATIONS  (STANDING):  enoxaparin Injectable 40 milliGRAM(s) SubCutaneous daily  levothyroxine 50 MICROGram(s) Oral daily    MEDICATIONS  (PRN):  guaiFENesin Oral Liquid (Sugar-Free) 100 milliGRAM(s) Oral every 6 hours PRN Cough    __________________________________________________  REVIEW OF SYSTEMS:    CONSTITUTIONAL: No fever,   EYES: no acute visual disturbances  NECK: No pain or stiffness  RESPIRATORY: No cough; No shortness of breath  CARDIOVASCULAR: No chest pain, no palpitations  GASTROINTESTINAL: No pain. No nausea or vomiting; No diarrhea   NEUROLOGICAL: No headache or numbness, no tremors  MUSCULOSKELETAL: No joint pain, no muscle pain  GENITOURINARY: no dysuria, no frequency, no hesitancy  PSYCHIATRY: no depression , no anxiety  ALL OTHER  ROS negative        Vital Signs Last 24 Hrs  T(C): 36.6 (20 Jan 2022 06:00), Max: 36.6 (19 Jan 2022 21:48)  T(F): 97.9 (20 Jan 2022 06:00), Max: 97.9 (20 Jan 2022 06:00)  HR: 75 (20 Jan 2022 06:00) (68 - 76)  BP: 116/77 (20 Jan 2022 06:00) (110/73 - 116/77)  BP(mean): --  RR: 17 (20 Jan 2022 06:00) (17 - 18)  SpO2: 100% (20 Jan 2022 06:00) (94% - 100%)    ________________________________________________  PHYSICAL EXAM:  GENERAL: NAD, pleasant  HEENT: Normocephalic;  conjunctivae and sclerae clear; moist mucous membranes;   NECK : supple  CHEST/LUNG: Clear to auscultation bilaterally with fair air entry no wheezing  HEART: S1 S2  regular; no murmurs, gallops or rubs  ABDOMEN: Soft, Nontender, Nondistended; Bowel sounds present  EXTREMITIES: no cyanosis; no edema; no calf tenderness  SKIN: warm and dry; no rash  NERVOUS SYSTEM:  Awake and alert; Oriented  to self and place.     _________________________________________________  LABS:    CAPILLARY BLOOD GLUCOSE    RADIOLOGY & ADDITIONAL TESTS:    Imaging  Reviewed:  YES    Consultant(s) Notes Reviewed:   YES      Plan of care was discussed with patient and /or primary care giver; all questions and concerns were addressed

## 2022-01-20 NOTE — PROGRESS NOTE ADULT - PROBLEM SELECTOR PROBLEM 1
Brief loss of consciousness
Pneumonia due to COVID-19 virus
Brief loss of consciousness

## 2022-01-20 NOTE — PROGRESS NOTE ADULT - PROBLEM SELECTOR PLAN 6
-medically optimized, pending EILEEN placement, CM/SW following.   -next COVID pcr 1/20 ordered
- DVT: lovenox  - GI: none
- DVT: lovenox  - GI: none
-medically optimzed, pending EILEEN placement  -next COVID pcr 1/20 ordered
- DVT: lovenox  - GI: none
- DVT: lovenox  - GI: none
-medically optimzed, pending EILEEN placement

## 2022-01-20 NOTE — PROGRESS NOTE ADULT - PROBLEM SELECTOR PROBLEM 6
Discharge planning issues
Prophylactic measure
Discharge planning issues
Discharge planning issues
Prophylactic measure

## 2022-01-20 NOTE — PROGRESS NOTE ADULT - ATTENDING COMMENTS
77yo F PMHx of hypothyroidism (not taking medications) who presented to the hospital after a syncopal episode. Daughter reports patient was breathing heavily and then slumped in her chair. Patient was unresponsive for a few minutes and then awoke and was at her baseline. Patient has been having a general mental decline over the past year that has progressed over the last few weeks. Patient stopped following up with doctors and taking medications for at least 4-5 years after the passing of her . Labs show elevated TSH. Check Free T4, T3. Start levothyroxine 50mcg daily. Endocrine consult. Patient likely has dementia, but may have exacerbation from hypothyroidism vs. COVID. Troponin negative x2, no events on telemetry. Syncope likely related to COVID infection or orthostatic hypotension.   - Repeat COVID + for EILEEN placeement  - Tolerating RA  - Encourage oral intake  - Continue Levothyroxine with plan for outpatient follow up  - Continue on Remdesivir until discharge  -  PT recommend EILEEN.   -  to follow-up for COVID EILEEN placement.  - f/u endocrine
75yo F PMHx of hypothyroidism (not taking medications) who presented to the hospital after a syncopal episode. Daughter reports patient was breathing heavily and then slumped in her chair. Patient was unresponsive for a few minutes and then awoke and was at her baseline. Patient has been having a general mental decline over the past year that has progressed over the last few weeks. Patient stopped following up with doctors and taking medications for at least 4-5 years after the passing of her . Labs show elevated TSH. Check Free T4, T3. Start levothyroxine 50mcg daily. Endocrine consult. Patient likely has dementia, but may have exacerbation from hypothyroidism vs. COVID. Troponin negative x2, no events on telemetry. Syncope likely related to COVID infection or orthostatic hypotension.   - Repeat COVID + for EILEEN placement. COVID positive 1/10, 1/17  - Tolerating RA  - Encourage oral intake  - Continue Levothyroxine with plan for outpatient follow up  - Continue on Remdesivir until discharge  -  PT recommend EILEEN.   -  to follow-up for COVID EILEEN placement.  - f/u endocrine
77yo F PMHx of hypothyroidism (not taking medications) who presented to the hospital after a syncopal episode. Daughter reports patient was breathing heavily and then slumped in her chair. Patient was unresponsive for a few minutes and then awoke and was at her baseline. Patient has been having a general mental decline over the past year that has progressed over the last few weeks. Patient stopped following up with doctors and taking medications for at least 4-5 years after the passing of her . Labs show elevated TSH. Check Free T4, T3. Start levothyroxine 50mcg daily. Endocrine consult. Patient likely has dementia, but may have exacerbation from hypothyroidism vs. COVID. Troponin negative x2, no events on telemetry. Syncope likely related to COVID infection or orthostatic hypotension.   - Repeat COVID + for EILEEN placeement. COVID positive 1/10, 1/17  - Tolerating RA  - Encourage oral intake  - Continue Levothyroxine with plan for outpatient follow up  - Continue on Remdesivir until discharge  -  PT recommend EILEEN.   -  to follow-up for COVID EILEEN placement.  - f/u endocrine
75yo F PMHx of hypothyroidism (not taking medications) who presented to the hospital after a syncopal episode. Daughter reports patient was breathing heavily and then slumped in her chair. Patient was unresponsive for a few minutes and then awoke and was at her baseline. Patient has been having a general mental decline over the past year that has progressed over the last few weeks. Patient stopped following up with doctors and taking medications for at least 4-5 years after the passing of her . Labs show elevated TSH. Check Free T4, T3. Start levothyroxine 50mcg daily. Endocrine consult. Patient likely has dementia, but may have exacerbation from hypothyroidism vs. COVID. Troponin negative x2, no events on telemetry. Syncope likely related to COVID infection or orthostatic hypotension. Patient maintaining saturation >92% on room air, but has cough. Encourage oral intake. Continue on Remdesivir D4. PT recommend EILEEN.  to follow-up for COVID EILEEN placement.
77yo F PMHx of hypothyroidism (not taking medications) who presented to the hospital after a syncopal episode. Daughter reports patient was breathing heavily and then slumped in her chair. Patient was unresponsive for a few minutes and then awoke and was at her baseline. Patient has been having a general mental decline over the past year that has progressed over the last few weeks. Patient stopped following up with doctors and taking medications for at least 4-5 years after the passing of her . Labs show elevated TSH. Check Free T4, T3. Start levothyroxine 50mcg daily. Endocrine consult. Patient likely has dementia, but may have exacerbation from hypothyroidism vs. COVID. Troponin negative x2, no events on telemetry. Syncope likely related to COVID infection or orthostatic hypotension. Patient maintaining saturation >92% on room air, but has cough. Continue on remdesivir D3. PT recommend EILEEN.  to follow-up for COVID EILEEN.
Patient seen on 1/11/22.    75yo F PMHx of hypothyroidism (not taking medications) who presented to the hospital after a syncopal episode. Daughter reports patient was breathing heavily and then slumped in her chair. Patient was unresponsive for a few minutes and then awoke and was at her baseline. Patient has been having a general mental decline over the past year that has progressed over the last few weeks. Patient stopped following up with doctors and taking medications for atleast 4-5 years after the passing of her . Labs show elevated TSH. Check Free T4, T3. Start levothyroxie 50mcg daily. Endocrine consult. Troponin negative x2, no events on telemetry. Syncope likely related to COVID infection. Patient borderline hypoxic. Start on remdesmivir.
75yo F PMHx of hypothyroidism (not taking medications) who presented to the hospital after a syncopal episode. Daughter reports patient was breathing heavily and then slumped in her chair. Patient was unresponsive for a few minutes and then awoke and was at her baseline. Patient has been having a general mental decline over the past year that has progressed over the last few weeks. Patient stopped following up with doctors and taking medications for at least 4-5 years after the passing of her . Labs show elevated TSH. Check Free T4, T3. Start levothyroxine 50mcg daily. Endocrine consult. Patient likely has dementia, but may have exacerbation from hypothyroidism. Troponin negative x2, no events on telemetry. Syncope likely related to COVID infection or orthostatic hypotension. Patient maintaining saturation >92% on room air, but has cough. Continue on remdesivir. PT recommend EILEEN.  to follow-up for COVID EILEEN.

## 2022-01-20 NOTE — PROGRESS NOTE ADULT - ASSESSMENT
75yo F PMHx of hypothyroidism (not taking medications) who presented to the hospital after a syncopal episode. Daughter reports patient was breathing heavily and then slumped in her chair. Patient was unresponsive for a few minutes and then awoke and was at her baseline. Patient has been having a general mental decline over the past year that has progressed over the last few weeks. Patient stopped following up with doctors and taking medications for at least 4-5 years after the passing of her . Labs show elevated TSH. Check Free T4, T3. Start levothyroxine 50mcg daily. Endocrine consult. Patient likely has dementia, but may have exacerbation from hypothyroidism vs. COVID. Troponin negative x2, no events on telemetry. Syncope likely related to COVID infection or orthostatic hypotension.   - Repeat COVID + for EILEEN placeement. COVID positive 1/10, 1/17  - Tolerating RA  - Encourage oral intake  - Continue Levothyroxine with plan for outpatient follow up  - Continue on Remdesivir until discharge  -  PT recommend EILEEN.   -  to follow-up for COVID EILEEN placement.  - f/u endocrine
75yo F PMHx of hypothyroidism (not taking medications) who presented to the hospital after a syncopal episode. Daughter reports patient was breathing heavily and then slumped in her chair. Patient was unresponsive for a few minutes and then awoke and was at her baseline. Patient has been having a general mental decline over the past year that has progressed over the last few weeks. Patient stopped following up with doctors and taking medications for at least 4-5 years after the passing of her . Labs show elevated TSH. Check Free T4, T3. Start levothyroxine 50mcg daily. Endocrine consult. Patient likely has dementia, but may have exacerbation from hypothyroidism vs. COVID. Troponin negative x2, no events on telemetry. Syncope likely related to COVID infection or orthostatic hypotension.   - Repeat COVID + for EILEEN placeement  - Tolerating RA  - Encourage oral intake  - Continue Levothyroxine with plan for outpatient follow up  - Continue on Remdesivir until discharge  -  PT recommend EILEEN.   -  to follow-up for COVID EILEEN placement. Possible DC tomorrow  - f/u endocrine
75yo F PMHx of hypothyroidism (not taking medications) who presented to the hospital after a syncopal episode. Daughter reports patient was breathing heavily and then slumped in her chair. Patient was unresponsive for a few minutes and then awoke and was at her baseline. Patient has been having a general mental decline over the past year that has progressed over the last few weeks. Patient stopped following up with doctors and taking medications for at least 4-5 years after the passing of her . Labs show elevated TSH. Check Free T4, T3. Start levothyroxine 50mcg daily. Endocrine consult. Patient likely has dementia, but may have exacerbation from hypothyroidism vs. COVID. Troponin negative x2, no events on telemetry. Syncope likely related to COVID infection or orthostatic hypotension.   - Tolerating RA  - Encourage oral intake  - Continue Levothyroxine with plan for outpatient follow up  - Continue on Remdesivir until discharge  -  PT recommend EILEEN.   -  to follow-up for COVID EILEEN placement. Possible DC tomorrow  - f/u endocrine
77yo F PMHx of hypothyroidism (not taking medications) who presented to the hospital after a syncopal episode. Daughter reports patient was breathing heavily and then slumped in her chair. Patient was unresponsive for a few minutes and then awoke and was at her baseline. Patient has been having a general mental decline over the past year that has progressed over the last few weeks. Patient stopped following up with doctors and taking medications for at least 4-5 years after the passing of her . Labs show elevated TSH. Check Free T4, T3. Start levothyroxine 50mcg daily. Endocrine consult. Patient likely has dementia, but may have exacerbation from hypothyroidism vs. COVID. Troponin negative x2, no events on telemetry. Syncope likely related to COVID infection or orthostatic hypotension.   - Repeat COVID for EILEEN placeement  - Tolerating RA  - Encourage oral intake  - Continue Levothyroxine with plan for outpatient follow up  - Continue on Remdesivir until discharge  -  PT recommend EILEEN.   -  to follow-up for COVID EILEEN placement. Possible DC tomorrow  - f/u endocrine
77 y/o female, AOx2, minimally ambulatory at baseline, w/ known PMH of sarcoidosis and hypothyroidism (non-complaint w/ medications), presents to the hospital with daughter c/o syncopal episode. Patient stopped following up with doctors and taking medications for at least 4-5 years after the passing of her . Pt was also found to be covid positive on 1/10. Admitted to medicine for acute encephalopathy 2/2 covid pna. Pt MS improving at baseline.   CXR: bilateral infiltrates  CT Head: no acute findings    PT recommended EILEEN, CM following.     Pt remains afebrile, Saturating well on room air, 95%.   Last COVID repeat 1/17 positive, next COVID PCR ordered fo r 1/20.   
77 y/o female, AOx2, minimally ambulatory at baseline, w/ known PMH of sarcoidosis and hypothyroidism (non-complaint w/ medications), presents to the hospital with daughter c/o syncopal episode. Patient stopped following up with doctors and taking medications for at least 4-5 years after the passing of her . Pt was also found to be covid positive on 1/10. Admitted to medicine for acute encephalopathy 2/2 covid pna. Pt MS improving at baseline.   CXR: bilateral infiltrates  CT Head: no acute findings    PT recommended EIELEN    1/19- pt is afebrile, on room air 94-96 % oxygen saturation.  Last COVID repeat 1/17 positive, next COVID PCR ordered fo r 1/20. 
75 y/o female, AOx2, minimally ambulatory at baseline, w/ known PMH of sarcoidosis and hypothyroidism (non-complaint w/ medications), presents to the hospital with daughter c/o syncopal episode. Patient stopped following up with doctors and taking medications for at least 4-5 years after the passing of her . Pt was also found to be covid positive on 1/10. Admitted to medicine for acute encephalopathy 2/2 covid pna.     CXR: bilateral infiltrates  CT Head: no acute findings    PT recommended EILEEN    1/18- pt is afebrile, on room air 95% oxygen saturation. 
Patient is a 76yoF, AAOx2 and minimally ambulatory at baseline, w/ known PMH of sarcoidosis and hypothyroidism (non-complaint w/ medications), presents with loss of consciousness. Admitted for syncope work up.
Patient is a 76yoF, AAOx2 and minimally ambulatory at baseline, w/ known PMH of sarcoidosis and hypothyroidism (non-complaint w/ medications), presents with loss of unconsciousness. Admitted for syncope work up.

## 2022-01-21 VITALS
SYSTOLIC BLOOD PRESSURE: 105 MMHG | RESPIRATION RATE: 18 BRPM | OXYGEN SATURATION: 93 % | DIASTOLIC BLOOD PRESSURE: 75 MMHG | HEART RATE: 90 BPM | TEMPERATURE: 96 F

## 2022-01-21 PROCEDURE — 97116 GAIT TRAINING THERAPY: CPT

## 2022-01-21 PROCEDURE — 96360 HYDRATION IV INFUSION INIT: CPT

## 2022-01-21 PROCEDURE — 85025 COMPLETE CBC W/AUTO DIFF WBC: CPT

## 2022-01-21 PROCEDURE — 80053 COMPREHEN METABOLIC PANEL: CPT

## 2022-01-21 PROCEDURE — 93005 ELECTROCARDIOGRAM TRACING: CPT

## 2022-01-21 PROCEDURE — 36415 COLL VENOUS BLD VENIPUNCTURE: CPT

## 2022-01-21 PROCEDURE — 85384 FIBRINOGEN ACTIVITY: CPT

## 2022-01-21 PROCEDURE — 84439 ASSAY OF FREE THYROXINE: CPT

## 2022-01-21 PROCEDURE — 85610 PROTHROMBIN TIME: CPT

## 2022-01-21 PROCEDURE — 84484 ASSAY OF TROPONIN QUANT: CPT

## 2022-01-21 PROCEDURE — 97110 THERAPEUTIC EXERCISES: CPT

## 2022-01-21 PROCEDURE — 85027 COMPLETE CBC AUTOMATED: CPT

## 2022-01-21 PROCEDURE — 99239 HOSP IP/OBS DSCHRG MGMT >30: CPT | Mod: GC

## 2022-01-21 PROCEDURE — 80061 LIPID PANEL: CPT

## 2022-01-21 PROCEDURE — 87635 SARS-COV-2 COVID-19 AMP PRB: CPT

## 2022-01-21 PROCEDURE — 99285 EMERGENCY DEPT VISIT HI MDM: CPT | Mod: 25

## 2022-01-21 PROCEDURE — 83036 HEMOGLOBIN GLYCOSYLATED A1C: CPT

## 2022-01-21 PROCEDURE — 86803 HEPATITIS C AB TEST: CPT

## 2022-01-21 PROCEDURE — 83880 ASSAY OF NATRIURETIC PEPTIDE: CPT

## 2022-01-21 PROCEDURE — 87640 STAPH A DNA AMP PROBE: CPT

## 2022-01-21 PROCEDURE — 87040 BLOOD CULTURE FOR BACTERIA: CPT

## 2022-01-21 PROCEDURE — 85379 FIBRIN DEGRADATION QUANT: CPT

## 2022-01-21 PROCEDURE — 87641 MR-STAPH DNA AMP PROBE: CPT

## 2022-01-21 PROCEDURE — 85730 THROMBOPLASTIN TIME PARTIAL: CPT

## 2022-01-21 PROCEDURE — 87086 URINE CULTURE/COLONY COUNT: CPT

## 2022-01-21 PROCEDURE — 85652 RBC SED RATE AUTOMATED: CPT

## 2022-01-21 PROCEDURE — 97530 THERAPEUTIC ACTIVITIES: CPT

## 2022-01-21 PROCEDURE — 84100 ASSAY OF PHOSPHORUS: CPT

## 2022-01-21 PROCEDURE — 82550 ASSAY OF CK (CPK): CPT

## 2022-01-21 PROCEDURE — 82728 ASSAY OF FERRITIN: CPT

## 2022-01-21 PROCEDURE — 71045 X-RAY EXAM CHEST 1 VIEW: CPT

## 2022-01-21 PROCEDURE — 70450 CT HEAD/BRAIN W/O DYE: CPT | Mod: MA

## 2022-01-21 PROCEDURE — 84481 FREE ASSAY (FT-3): CPT

## 2022-01-21 PROCEDURE — 83735 ASSAY OF MAGNESIUM: CPT

## 2022-01-21 PROCEDURE — 84443 ASSAY THYROID STIM HORMONE: CPT

## 2022-01-21 PROCEDURE — 82962 GLUCOSE BLOOD TEST: CPT

## 2022-01-21 PROCEDURE — 83615 LACTATE (LD) (LDH) ENZYME: CPT

## 2022-01-21 PROCEDURE — 80048 BASIC METABOLIC PNL TOTAL CA: CPT

## 2022-01-21 PROCEDURE — 86140 C-REACTIVE PROTEIN: CPT

## 2022-01-21 PROCEDURE — 87077 CULTURE AEROBIC IDENTIFY: CPT

## 2022-01-21 PROCEDURE — 87186 SC STD MICRODIL/AGAR DIL: CPT

## 2022-01-21 PROCEDURE — 81001 URINALYSIS AUTO W/SCOPE: CPT

## 2022-01-21 PROCEDURE — 84145 PROCALCITONIN (PCT): CPT

## 2022-01-21 PROCEDURE — 83605 ASSAY OF LACTIC ACID: CPT

## 2022-01-21 RX ORDER — LEVOTHYROXINE SODIUM 125 MCG
1 TABLET ORAL
Qty: 0 | Refills: 0 | DISCHARGE
Start: 2022-01-21

## 2022-01-21 RX ADMIN — Medication 100 MILLIGRAM(S): at 05:59

## 2022-01-21 RX ADMIN — Medication 50 MICROGRAM(S): at 05:59

## 2022-01-21 RX ADMIN — ENOXAPARIN SODIUM 40 MILLIGRAM(S): 100 INJECTION SUBCUTANEOUS at 12:05

## 2022-01-21 NOTE — DISCHARGE NOTE NURSING/CASE MANAGEMENT/SOCIAL WORK - NSPROEXTENSIONSOFSELF_GEN_A_NUR
Speech Language Pathology  Speech Language Pathology  [facility]    Speech Language Treatment Note    Date: 5/12/2020  Patients Name: Madyson Quezada  MRN: [id number]  Diagnosis:   Patient Active Problem List   Diagnosis Code    Cerebrovascular accident (CVA) ([facility] [address] I63.9    Acute ischemic left MCA stroke ([facility] [address] I63.512    Bradycardia R00.1    Aspiration pneumonia ([facility] [address] J69.0    Leukocytosis D72.829    Essential hypertension I10    Atrial fibrillation ([facility] [address] I48.91    Chronic acquired lymphedema I89.0    Oropharyngeal dysphagia R13.12    Acute CVA (cerebrovascular accident) ([facility] [address] I63.9       Pain: 4/10  (R leg)    Speech and Language Treatment  Treatment time:  [id number]    Subjective: [x] Alert [x] Cooperative     [] Confused     [] Agitated      [] Lethargic    Objective/Assessment:  Auditory Comprehension:  Pt. Able to follow general directions and answer y/n questions asked. Verbal Expression:  n/a d/t severe apraxia    Reading Comprehension:  N/a    Speech:   Pt. Completed oral motor exercises x10 with increased accuracy when using mirror. Pt. Able to imitate  \"ha\", \"hay\", \"hi\" Yulisa. Pt. Needed cues for 'how', 'who\". Pt. Unable to repeat \"he\", \"ho\". Pt. Able to imitate CV syllables beginning c /b/ and /m/ with 100% (mostly approximations). Pt. Named objects around room c 25%, 75% c cues, making approximations. Pt. Stating \"bye\"  as ST leaving room and \"hello? \" when ST knocked on door, also \"okay\", \"I don't know\" and \"no\". Pt. Attempted singing several familiar songs c ST, increase in phoneme productions c ST visual cues. Pt. Mostly singing on \"whoa\", with few appropriate phonemes/approximations. Other:       Plan:  [x] Continue ST services    [] Discharge from ST:      Discharge recommendations: [] Inpatient Rehab   [] [place]   [] Outpatient Therapy  [] Follow up at trauma clinic   [] Other:       Treatment completed by: Roya Mendes A.CCC/SLP none

## 2022-01-21 NOTE — DISCHARGE NOTE NURSING/CASE MANAGEMENT/SOCIAL WORK - NSDCPEFALRISK_GEN_ALL_CORE
For information on Fall & Injury Prevention, visit: [email or web address] OR  [email or web address] OR  https://www.cdc.gov/steadi/patient.html

## 2022-01-21 NOTE — DISCHARGE NOTE NURSING/CASE MANAGEMENT/SOCIAL WORK - PATIENT PORTAL LINK FT
You can access the FollowMyHealth Patient Portal offered by [facility] by registering at the following website: [email or web address]. By joining [facility]’s FollowMyHealth portal, you will also be able to view your health information using other applications (apps) compatible with our system.

## 2022-05-21 ENCOUNTER — INPATIENT (INPATIENT)
Facility: HOSPITAL | Age: 77
LOS: 2 days | Discharge: HOME | DRG: 641 | End: 2022-05-24
Attending: INTERNAL MEDICINE | Admitting: INTERNAL MEDICINE
Payer: MEDICARE

## 2022-05-21 VITALS
OXYGEN SATURATION: 100 % | WEIGHT: 199.96 LBS | RESPIRATION RATE: 17 BRPM | HEIGHT: 62 IN | DIASTOLIC BLOOD PRESSURE: 54 MMHG | TEMPERATURE: 98 F | SYSTOLIC BLOOD PRESSURE: 111 MMHG | HEART RATE: 69 BPM

## 2022-05-21 DIAGNOSIS — R55 SYNCOPE AND COLLAPSE: ICD-10-CM

## 2022-05-21 LAB
ALBUMIN SERPL ELPH-MCNC: 2.2 G/DL — LOW (ref 3.5–5)
ALP SERPL-CCNC: 60 U/L — SIGNIFICANT CHANGE UP (ref 40–120)
ALT FLD-CCNC: 9 U/L DA — LOW (ref 10–60)
ANION GAP SERPL CALC-SCNC: 7 MMOL/L — SIGNIFICANT CHANGE UP (ref 5–17)
AST SERPL-CCNC: 13 U/L — SIGNIFICANT CHANGE UP (ref 10–40)
BASOPHILS # BLD AUTO: 0.08 K/UL — SIGNIFICANT CHANGE UP (ref 0–0.2)
BASOPHILS NFR BLD AUTO: 0.6 % — SIGNIFICANT CHANGE UP (ref 0–2)
BILIRUB SERPL-MCNC: 1.1 MG/DL — SIGNIFICANT CHANGE UP (ref 0.2–1.2)
BUN SERPL-MCNC: 8 MG/DL — SIGNIFICANT CHANGE UP (ref 7–18)
CALCIUM SERPL-MCNC: 8.9 MG/DL — SIGNIFICANT CHANGE UP (ref 8.4–10.5)
CHLORIDE SERPL-SCNC: 103 MMOL/L — SIGNIFICANT CHANGE UP (ref 96–108)
CO2 SERPL-SCNC: 32 MMOL/L — HIGH (ref 22–31)
CREAT SERPL-MCNC: 0.69 MG/DL — SIGNIFICANT CHANGE UP (ref 0.5–1.3)
EGFR: 90 ML/MIN/1.73M2 — SIGNIFICANT CHANGE UP
EOSINOPHIL # BLD AUTO: 0.31 K/UL — SIGNIFICANT CHANGE UP (ref 0–0.5)
EOSINOPHIL NFR BLD AUTO: 2.5 % — SIGNIFICANT CHANGE UP (ref 0–6)
GLUCOSE SERPL-MCNC: 127 MG/DL — HIGH (ref 70–99)
HCT VFR BLD CALC: 41.2 % — SIGNIFICANT CHANGE UP (ref 34.5–45)
HGB BLD-MCNC: 12.9 G/DL — SIGNIFICANT CHANGE UP (ref 11.5–15.5)
IMM GRANULOCYTES NFR BLD AUTO: 0.5 % — SIGNIFICANT CHANGE UP (ref 0–1.5)
LYMPHOCYTES # BLD AUTO: 1.26 K/UL — SIGNIFICANT CHANGE UP (ref 1–3.3)
LYMPHOCYTES # BLD AUTO: 10.1 % — LOW (ref 13–44)
MAGNESIUM SERPL-MCNC: 2.1 MG/DL — SIGNIFICANT CHANGE UP (ref 1.6–2.6)
MCHC RBC-ENTMCNC: 28.6 PG — SIGNIFICANT CHANGE UP (ref 27–34)
MCHC RBC-ENTMCNC: 31.3 GM/DL — LOW (ref 32–36)
MCV RBC AUTO: 91.4 FL — SIGNIFICANT CHANGE UP (ref 80–100)
MONOCYTES # BLD AUTO: 0.9 K/UL — SIGNIFICANT CHANGE UP (ref 0–0.9)
MONOCYTES NFR BLD AUTO: 7.2 % — SIGNIFICANT CHANGE UP (ref 2–14)
NEUTROPHILS # BLD AUTO: 9.85 K/UL — HIGH (ref 1.8–7.4)
NEUTROPHILS NFR BLD AUTO: 79.1 % — HIGH (ref 43–77)
NRBC # BLD: 0 /100 WBCS — SIGNIFICANT CHANGE UP (ref 0–0)
PLATELET # BLD AUTO: 391 K/UL — SIGNIFICANT CHANGE UP (ref 150–400)
POTASSIUM SERPL-MCNC: 2.8 MMOL/L — CRITICAL LOW (ref 3.5–5.3)
POTASSIUM SERPL-SCNC: 2.8 MMOL/L — CRITICAL LOW (ref 3.5–5.3)
PROT SERPL-MCNC: 7.1 G/DL — SIGNIFICANT CHANGE UP (ref 6–8.3)
RBC # BLD: 4.51 M/UL — SIGNIFICANT CHANGE UP (ref 3.8–5.2)
RBC # FLD: 14.9 % — HIGH (ref 10.3–14.5)
SODIUM SERPL-SCNC: 142 MMOL/L — SIGNIFICANT CHANGE UP (ref 135–145)
TROPONIN I, HIGH SENSITIVITY RESULT: 15.1 NG/L — SIGNIFICANT CHANGE UP
WBC # BLD: 12.46 K/UL — HIGH (ref 3.8–10.5)
WBC # FLD AUTO: 12.46 K/UL — HIGH (ref 3.8–10.5)

## 2022-05-21 PROCEDURE — G1004: CPT

## 2022-05-21 PROCEDURE — 99285 EMERGENCY DEPT VISIT HI MDM: CPT

## 2022-05-21 PROCEDURE — 70450 CT HEAD/BRAIN W/O DYE: CPT | Mod: 26,MG

## 2022-05-21 PROCEDURE — 93010 ELECTROCARDIOGRAM REPORT: CPT

## 2022-05-21 PROCEDURE — 99223 1ST HOSP IP/OBS HIGH 75: CPT | Mod: GC

## 2022-05-21 RX ORDER — POTASSIUM CHLORIDE 20 MEQ
10 PACKET (EA) ORAL
Refills: 0 | Status: COMPLETED | OUTPATIENT
Start: 2022-05-21 | End: 2022-05-22

## 2022-05-21 RX ADMIN — Medication 100 MILLIEQUIVALENT(S): at 22:04

## 2022-05-21 NOTE — H&P ADULT - PROBLEM SELECTOR PLAN 1
Pt witnessed to have LOC in sitting position, slumping to ground, down 15 mins   Unclear etiology but in setting of sarcoidosis, cannot rule out cardiogenic causes including arrythmia   Other causes include hypokalemia triggered arrythmia (2.8 on admission), ?UTI (hx of carbapenem rst org)  C/w tele monitoring, F/u Echo to r/o valvular abnormalities   Potassium repleted   Check orthostatics    F/u TSH, BMP for rpt K+, Urinalysis, and UCx

## 2022-05-21 NOTE — H&P ADULT - NSHPPHYSICALEXAM_GEN_ALL_CORE
GENERAL APPEARANCE: Overweight AAF, AA0x3, in NAD on RA   HEENT: Normocephalic, PERRL, EOMI External auditory canals clear, hearing grossly intact, no nasal discharge   THROAT: Oral cavity and pharynx normal. No inflammation, swelling, exudate, or lesions.  CARDIAC: Normal S1 and S2. No S3, S4 or murmurs. Rhythm is regular. There is no peripheral edema, cyanosis or pallor.  LUNGS: Clear to auscultation and percussion without rales, rhonchi, wheezing or diminished breath sounds.  ABDOMEN: Positive bowel sounds. Soft, nondistended, lower abdominal tendernes son deep palpation + . No guarding or rebound. No masses.  EXTREMITIES: No significant deformity or joint abnormality. No edema. Peripheral pulses intact. No varicosities.  NEUROLOGICAL: CN II-XII intact. Strength and sensation symmetric and intact throughout. Reflexes 2+ throughout.   SKIN: No skin breakdown, lesions or rashes noted

## 2022-05-21 NOTE — H&P ADULT - ATTENDING COMMENTS
Patient seen and examined at bedside.    Vital Signs Last 24 Hrs  T(C): 36.6 (21 May 2022 18:22), Max: 36.6 (21 May 2022 18:22)  T(F): 97.8 (21 May 2022 18:22), Max: 97.8 (21 May 2022 18:22)  HR: 69 (21 May 2022 18:22) (69 - 69)  BP: 111/54 (21 May 2022 18:22) (111/54 - 111/54)  BP(mean): --  RR: 17 (21 May 2022 18:22) (17 - 17)  SpO2: 100% (21 May 2022 18:22) (100% - 100%)      Labs and imaging studies noted.    EKG T wave inv lateral leads, no change as compare to prior ekg.     CT head:  No acute intracranial hemorrhage, territorial infarct or mass effect.      Assessment and plan: Patient is a 75 y/o female, from home, baseline  and ambulatory w/ wheelchair, with PMH of Sarcoidosis, and Hypothyroidism who was BIBEMS after she had lost consciousness in a sitting position and slipped from her wheelchair witnessed by her daughter. Patient is pleasant on interview and reports that she has no recollection of the event; she only remembers waking up in the ambulance. Collateral history was collected from daughter Dariana who reports that the patient was sitting in her wheelchair when she had a sudden LOC and slumped to the floor without headstrike or other trauma. Despite trying to awaken her, she remained unconscious for >15 minutes according to her family. no tongue bite, urinary incontinence, tonic clonic movements, or preceding dysarthria or facial droop. Patient only complains of lower abdominal pain, less PO intake for a few days due to nausea, and dysuria w/ burning. She denies any headaches, visual disturbances, V/D, dizziness, cough, chest pain, palpitations, lower extremity swelling, or fevers.   Of note patient was admitted in Jan 2022, for syncope, was thought to be due to metabolic encephalopathy 2/2 COVID +uncontrolled hypothyroidism.    patient AAOx2-3, afebrile, pleasant affect, no FND.  labs wbc 13k, potassium 2.8, alb 2.2.  EKG T wave inv in lateral leads, same as prior EKG.     Vital Signs Last 24 Hrs  T(C): 36.6 (21 May 2022 18:22), Max: 36.6 (21 May 2022 18:22)  T(F): 97.8 (21 May 2022 18:22), Max: 97.8 (21 May 2022 18:22)  HR: 69 (21 May 2022 18:22) (69 - 69)  BP: 111/54 (21 May 2022 18:22) (111/54 - 111/54)  BP(mean): --  RR: 17 (21 May 2022 18:22) (17 - 17)  SpO2: 100% (21 May 2022 18:22) (100% - 100%)  Physical exam as above    Labs and imaging studies noted.    CT head:  No acute intracranial hemorrhage, territorial infarct or mass effect.    Assessment and plan: Patient is a 75 y/o female, from home, baseline  and ambulatory w/ wheelchair, with PMH of Sarcoidosis, and Hypothyroidism who was BIBEMS after she had lost consciousness in a sitting position being admitted for syncope workup.    Syncopal episode  Hypokalemia  h/o sarcoidosis  Hypothyroidism    - p/w with witnessed episode of syncope, while sitting. admitted in jan with syncope.  - reports decreased oral intake, reports nausea, dysuria.  - Etio: hypovolemia, vs. hypokalemia induced ?,/arrhythmia/ cardiogenic. h/o sarcoidosis.   - hypokalemic, EKG T wave inv in lateral leads, same as prior. CT head neg for acute abn.  - orthostatic vitals negative noted,  - telemonitoring.  - ECHO  - check vit D, PTH hx of sarcoidosis, corrected calcium 10.3.  - potassium replaced, repeat bmp.  - reports dysuria, mild elevated wbc, afebrile. send UA. ( h/o ESBL UTI).  - was noted to have uncontrolled hypothyroidism last admission, reports compliance to meds, check TSH   - resume levothyroxine 50 mcg

## 2022-05-21 NOTE — ED PROVIDER NOTE - DR. NAME
[facility] Progress Note    Admitting Date and Time: 8/30/2021  2:25 PM  Admit Dx: Pneumonia due to COVID-19 virus [U07.1, J12.82]    Subjective:  Patient is being followed for Pneumonia due to COVID-19 virus [U07.1, J12.82]   Pt started having worsening oxygenation and hypoxia on 15 L now oneven on 60L with 100 FiO2, O2 sat 82-85    ROS: denies fever, chills, cp, n/v, HA unless stated above.       dexamethasone  6 mg IntraVENous Q6H    insulin glargine  25 Units SubCUTAneous Nightly    insulin lispro  7 Units SubCUTAneous TID WC    insulin lispro  0-18 Units SubCUTAneous TID WC    insulin lispro  0-9 Units SubCUTAneous Nightly    nystatin  5 mL Oral 4x Daily    amLODIPine  5 mg Oral Daily    losartan  100 mg Oral Daily    And    hydroCHLOROthiazide  25 mg Oral Daily    guaiFENesin-dextromethorphan  5 mL Oral Q6H    albuterol sulfate HFA  2 puff Inhalation Q4H WA    sodium chloride flush  5-40 mL IntraVENous 2 times per day    enoxaparin  40 mg SubCUTAneous BID    Vitamin D  2,000 Units Oral Daily    zinc sulfate  50 mg Oral Daily    ascorbic acid  500 mg Oral BID    pravastatin  20 mg Oral Daily    levothyroxine  150 mcg Oral Daily    pantoprazole  40 mg Oral Every Other Day     sodium chloride flush, 5-40 mL, PRN  sodium chloride, 25 mL, PRN  ondansetron, 4 mg, Q8H PRN   Or  ondansetron, 4 mg, Q6H PRN  polyethylene glycol, 17 g, Daily PRN  acetaminophen, 650 mg, Q6H PRN   Or  acetaminophen, 650 mg, Q6H PRN  glucose, 15 g, PRN  dextrose, 12.5 g, PRN  glucagon (rDNA), 1 mg, PRN  dextrose, 100 mL/hr, PRN  sodium chloride, 30 mL, PRN         Objective:    /68   Pulse 84   Temp 98.7 °F (37.1 °C) (Oral)   Resp 20   Ht 5' 8\" (1.727 m)   Wt 215 lb 4.8 oz (97.7 kg)   SpO2 (!) 85%   BMI 32.74 kg/m²     General Appearance: alert and oriented to person, place and time   Skin: warm and dry  Head: normocephalic and atraumatic  Eyes: pupils equal, round, and reactive to light, extraocular eye movements intact, conjunctivae normal  Neck: neck supple and non tender without mass   Pulmonary/Chest: decreased sounds bilaterally- no wheezes, normal air movement, no respiratory distress  Cardiovascular: normal rate, normal S1 and S2 and no carotid bruits  Abdomen: soft, non-tender, non-distended, normal bowel sounds, no masses or organomegaly  Extremities: no cyanosis, no clubbing and no edema  Neurologic: no cranial nerve deficit and speech normal        Recent Labs     09/06/21  0455 09/06/21  2150 09/08/21  0426    133 129*   K 4.7 3.9 4.5    99 100   CO2 24 22 21*   BUN 29* 25* 27*   CREATININE 1.0 1.0 0.9   GLUCOSE 333* 375* 195*   CALCIUM 9.8 9.4 9.8       Recent Labs     09/07/21 1956   WBC 15.4*   RBC 4.24   HGB 13.2   HCT 38.1   MCV 89.9   MCH 31.1   MCHC 34.6*   RDW 12.5      MPV 10.2        CTA:  1.  No indication for acute pulmonary emboli.       2.  Bilateral infiltrates that can be related with acute infectious viral   pneumonia.  Please correlate clinically.       3.  Some fullness of the collecting system of both kidneys.  See above   comments and recommendations. Assessment:    Active Problems:    Pneumonia due to COVID-19 virus    Acute respiratory failure with hypoxia (HCC)    Elevated LFTs    Uncontrolled type 2 diabetes mellitus with hyperglycemia ([facility] [address]  Resolved Problems:    * No resolved hospital problems. *      Plan:  1. Acute hypoxic aspiratory failure oxygen saturation was 87% on room air, worsening hypoxia on Optiflow, patient might need intubated, will discuss with pulm.   CTA chest was negative for PE  2.  COVID-19 pneumonia, start patient on Decadron and remdesivir, inflammatory markers improved, yet due to the worsening oxygenation CRP and D-dimer are low, continue monitoring not sure if Tucilizumab has any role to help, consider Baricitnib, discussed with the intensivist we will transfer the patient to the ICU, low threshold for intubation  3. Superimposed bacterial pneumonia with elevated procalcitonin, patient was finished a course of levaquin , appreciate pulmonary input, CTA was negative for PE  4. Elevated transaminases, most likely due to Covid infection, improved continue monitoring. 5.  History of diabetes type 2, blood glucose is not controlled due to steroids usually on Trulicity, Metformin and glimepiride, those were placed on hold, continue sliding scale insulin, better readings with lantus to 25 units and lispro 7 units with meals  6. History of hypertension, blood pressure is acceptable, antihypertensive medication were not continued on admission for some reason, restarted amlodipine, losartan/hydrochlorthiazide. 7.  History of hypothyroidism, continue levothyroxine      NOTE: This report was transcribed using voice recognition software. Every effort was made to ensure accuracy; however, inadvertent computerized transcription errors may be present.   Electronically signed by Cristian Horn MD on 9/8/2021 at 9:56 AM Merlin Valdez MD

## 2022-05-21 NOTE — ED ADULT NURSE NOTE - ED STAT RN HANDOFF DETAILS
pt. remained stable. admitted to [facility] for syncope. transfer to  501 report given to adelina minor.not in distress

## 2022-05-21 NOTE — H&P ADULT - MLM HIDDEN
35y  s/p scheduled rLTCS 10/3 presenting with LUQ pain x 1 day. Pain not characteristic of postop infection or complication. Likely GI or musculoskeletal etiology. Patient is clinically well and hemodynamically stable. No signs of systemic infection. Patient is stable for outpatient management. yes

## 2022-05-21 NOTE — ED PROVIDER NOTE - OBJECTIVE STATEMENT
76 y.o female with a PMHx of dementia, hypothyroidism, sarcoid, and COVID-19 pneumonia is coming in after a syncopal episode. Given dementia, collateral information was given by daughter. As per family, they were with the patient who was in a normal state of health today and was only complaining of chronic joint pain in the morning, mostly in the knees. Patient's daughter states that the granddaughter was getting ready to take the patient outside for fresh air in her wheelchair and then synopsized. Patient's family denies any head trauma given that the patient was already sitting down but reports that she was unresponsive for minutes. Patient's family denied any seizure-like activity. Patient is in the ED and is only complaining of chronic joint pain, not recalling the syncopal episode.

## 2022-05-21 NOTE — ED PROVIDER NOTE - CLINICAL SUMMARY MEDICAL DECISION MAKING FREE TEXT BOX
Syncopal episode. Patient does not recall syncopal event. Will get head CT, labs, and reassess for admission. Patient family watches her and states they will feel better if the patient is admitted.

## 2022-05-21 NOTE — ED ADULT NURSE NOTE - SUICIDE SCREENING QUESTION 1
29 yo Male with no significant past medical history presents with h/o needle stick today.  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  It was a small needle as per the patient and bleed and was immediately washed with iodine. No

## 2022-05-21 NOTE — H&P ADULT - HISTORY OF PRESENT ILLNESS
Patient is a 77 y/o female, from home, baseline  and ambulatory w/ wheelchair d/t  Patient is a 75 y/o female, from home, baseline  and ambulatory w/ wheelchair, with significant medical history of Sarcoidosis, and Hypothyroidism who was BIBEMS after she had lost consciousness in a sitting position and slipped from her wheelchair. Patient is pleasant on interview and reports that she has  no recollection of the event; she only remembers waking up in the ambulance. Collateral history was collected from daughter Dariana who reports that the patient was sitting in her wheelchair when she had a sudden LOC and slumped to the floor without headstrike or other trauma. Despite trying to awaken her, she remained unconscious for >15 minutes according to her family. no tongue bite, urinary incontinence, tonic clonic movements, or preceding dysarthria or facial droop. Patient only complains of lower abdominal pain, less PO intake for a few days due to nausea, and dysuria w/ burning. She denies any headaches, visual disturbances, V/D, dizziness, cough, chest pain, palpitations, lower extremity swelling, or fevers. No new medications except a dementia medication which the daughter is unable to remember the name of.

## 2022-05-21 NOTE — H&P ADULT - PROBLEM SELECTOR PLAN 4
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

## 2022-05-22 DIAGNOSIS — E03.9 HYPOTHYROIDISM, UNSPECIFIED: ICD-10-CM

## 2022-05-22 DIAGNOSIS — R55 SYNCOPE AND COLLAPSE: ICD-10-CM

## 2022-05-22 DIAGNOSIS — Z29.9 ENCOUNTER FOR PROPHYLACTIC MEASURES, UNSPECIFIED: ICD-10-CM

## 2022-05-22 DIAGNOSIS — Z86.19 PERSONAL HISTORY OF OTHER INFECTIOUS AND PARASITIC DISEASES: ICD-10-CM

## 2022-05-22 DIAGNOSIS — E87.6 HYPOKALEMIA: ICD-10-CM

## 2022-05-22 DIAGNOSIS — D86.9 SARCOIDOSIS, UNSPECIFIED: ICD-10-CM

## 2022-05-22 LAB
ANION GAP SERPL CALC-SCNC: 5 MMOL/L — SIGNIFICANT CHANGE UP (ref 5–17)
ANION GAP SERPL CALC-SCNC: 7 MMOL/L — SIGNIFICANT CHANGE UP (ref 5–17)
BASOPHILS # BLD AUTO: 0.05 K/UL — SIGNIFICANT CHANGE UP (ref 0–0.2)
BASOPHILS NFR BLD AUTO: 0.6 % — SIGNIFICANT CHANGE UP (ref 0–2)
BUN SERPL-MCNC: 10 MG/DL — SIGNIFICANT CHANGE UP (ref 7–18)
BUN SERPL-MCNC: 10 MG/DL — SIGNIFICANT CHANGE UP (ref 7–18)
CALCIUM SERPL-MCNC: 8.3 MG/DL — LOW (ref 8.4–10.5)
CALCIUM SERPL-MCNC: 8.8 MG/DL — SIGNIFICANT CHANGE UP (ref 8.4–10.5)
CALCIUM SERPL-MCNC: 9 MG/DL — SIGNIFICANT CHANGE UP (ref 8.4–10.5)
CHLORIDE SERPL-SCNC: 106 MMOL/L — SIGNIFICANT CHANGE UP (ref 96–108)
CHLORIDE SERPL-SCNC: 109 MMOL/L — HIGH (ref 96–108)
CO2 SERPL-SCNC: 30 MMOL/L — SIGNIFICANT CHANGE UP (ref 22–31)
CO2 SERPL-SCNC: 31 MMOL/L — SIGNIFICANT CHANGE UP (ref 22–31)
CREAT SERPL-MCNC: 0.53 MG/DL — SIGNIFICANT CHANGE UP (ref 0.5–1.3)
CREAT SERPL-MCNC: 0.58 MG/DL — SIGNIFICANT CHANGE UP (ref 0.5–1.3)
EGFR: 94 ML/MIN/1.73M2 — SIGNIFICANT CHANGE UP
EGFR: 96 ML/MIN/1.73M2 — SIGNIFICANT CHANGE UP
EOSINOPHIL # BLD AUTO: 0.24 K/UL — SIGNIFICANT CHANGE UP (ref 0–0.5)
EOSINOPHIL NFR BLD AUTO: 2.8 % — SIGNIFICANT CHANGE UP (ref 0–6)
GLUCOSE SERPL-MCNC: 91 MG/DL — SIGNIFICANT CHANGE UP (ref 70–99)
GLUCOSE SERPL-MCNC: 93 MG/DL — SIGNIFICANT CHANGE UP (ref 70–99)
HCT VFR BLD CALC: 40.3 % — SIGNIFICANT CHANGE UP (ref 34.5–45)
HGB BLD-MCNC: 12.6 G/DL — SIGNIFICANT CHANGE UP (ref 11.5–15.5)
IMM GRANULOCYTES NFR BLD AUTO: 0.5 % — SIGNIFICANT CHANGE UP (ref 0–1.5)
LYMPHOCYTES # BLD AUTO: 1.66 K/UL — SIGNIFICANT CHANGE UP (ref 1–3.3)
LYMPHOCYTES # BLD AUTO: 19.5 % — SIGNIFICANT CHANGE UP (ref 13–44)
MAGNESIUM SERPL-MCNC: 2.1 MG/DL — SIGNIFICANT CHANGE UP (ref 1.6–2.6)
MCHC RBC-ENTMCNC: 28.4 PG — SIGNIFICANT CHANGE UP (ref 27–34)
MCHC RBC-ENTMCNC: 31.3 GM/DL — LOW (ref 32–36)
MCV RBC AUTO: 90.8 FL — SIGNIFICANT CHANGE UP (ref 80–100)
MONOCYTES # BLD AUTO: 0.45 K/UL — SIGNIFICANT CHANGE UP (ref 0–0.9)
MONOCYTES NFR BLD AUTO: 5.3 % — SIGNIFICANT CHANGE UP (ref 2–14)
NEUTROPHILS # BLD AUTO: 6.06 K/UL — SIGNIFICANT CHANGE UP (ref 1.8–7.4)
NEUTROPHILS NFR BLD AUTO: 71.3 % — SIGNIFICANT CHANGE UP (ref 43–77)
NRBC # BLD: 0 /100 WBCS — SIGNIFICANT CHANGE UP (ref 0–0)
PHOSPHATE SERPL-MCNC: 2.2 MG/DL — LOW (ref 2.5–4.5)
PLATELET # BLD AUTO: 340 K/UL — SIGNIFICANT CHANGE UP (ref 150–400)
POTASSIUM SERPL-MCNC: 2.6 MMOL/L — CRITICAL LOW (ref 3.5–5.3)
POTASSIUM SERPL-MCNC: 4 MMOL/L — SIGNIFICANT CHANGE UP (ref 3.5–5.3)
POTASSIUM SERPL-SCNC: 2.6 MMOL/L — CRITICAL LOW (ref 3.5–5.3)
POTASSIUM SERPL-SCNC: 4 MMOL/L — SIGNIFICANT CHANGE UP (ref 3.5–5.3)
PTH-INTACT FLD-MCNC: 28 PG/ML — SIGNIFICANT CHANGE UP (ref 15–65)
RBC # BLD: 4.44 M/UL — SIGNIFICANT CHANGE UP (ref 3.8–5.2)
RBC # FLD: 14.7 % — HIGH (ref 10.3–14.5)
SARS-COV-2 RNA SPEC QL NAA+PROBE: SIGNIFICANT CHANGE UP
SODIUM SERPL-SCNC: 144 MMOL/L — SIGNIFICANT CHANGE UP (ref 135–145)
SODIUM SERPL-SCNC: 144 MMOL/L — SIGNIFICANT CHANGE UP (ref 135–145)
TSH SERPL-MCNC: 2.72 UU/ML — SIGNIFICANT CHANGE UP (ref 0.34–4.82)
WBC # BLD: 8.5 K/UL — SIGNIFICANT CHANGE UP (ref 3.8–10.5)
WBC # FLD AUTO: 8.5 K/UL — SIGNIFICANT CHANGE UP (ref 3.8–10.5)

## 2022-05-22 PROCEDURE — 93306 TTE W/DOPPLER COMPLETE: CPT | Mod: 26

## 2022-05-22 PROCEDURE — 99233 SBSQ HOSP IP/OBS HIGH 50: CPT | Mod: GC

## 2022-05-22 RX ORDER — POTASSIUM CHLORIDE 20 MEQ
40 PACKET (EA) ORAL EVERY 4 HOURS
Refills: 0 | Status: COMPLETED | OUTPATIENT
Start: 2022-05-22 | End: 2022-05-22

## 2022-05-22 RX ORDER — ACETAMINOPHEN 500 MG
650 TABLET ORAL EVERY 6 HOURS
Refills: 0 | Status: DISCONTINUED | OUTPATIENT
Start: 2022-05-22 | End: 2022-05-24

## 2022-05-22 RX ORDER — ENOXAPARIN SODIUM 100 MG/ML
40 INJECTION SUBCUTANEOUS EVERY 12 HOURS
Refills: 0 | Status: DISCONTINUED | OUTPATIENT
Start: 2022-05-22 | End: 2022-05-24

## 2022-05-22 RX ORDER — POTASSIUM CHLORIDE 20 MEQ
40 PACKET (EA) ORAL ONCE
Refills: 0 | Status: COMPLETED | OUTPATIENT
Start: 2022-05-22 | End: 2022-05-22

## 2022-05-22 RX ORDER — LEVOTHYROXINE SODIUM 125 MCG
50 TABLET ORAL DAILY
Refills: 0 | Status: DISCONTINUED | OUTPATIENT
Start: 2022-05-22 | End: 2022-05-24

## 2022-05-22 RX ORDER — CEFTRIAXONE 500 MG/1
1000 INJECTION, POWDER, FOR SOLUTION INTRAMUSCULAR; INTRAVENOUS EVERY 24 HOURS
Refills: 0 | Status: COMPLETED | OUTPATIENT
Start: 2022-05-22 | End: 2022-05-24

## 2022-05-22 RX ORDER — SODIUM CHLORIDE 9 MG/ML
500 INJECTION INTRAMUSCULAR; INTRAVENOUS; SUBCUTANEOUS ONCE
Refills: 0 | Status: COMPLETED | OUTPATIENT
Start: 2022-05-22 | End: 2022-05-22

## 2022-05-22 RX ORDER — POTASSIUM CHLORIDE 20 MEQ
10 PACKET (EA) ORAL
Refills: 0 | Status: DISCONTINUED | OUTPATIENT
Start: 2022-05-22 | End: 2022-05-23

## 2022-05-22 RX ORDER — POTASSIUM PHOSPHATE, MONOBASIC POTASSIUM PHOSPHATE, DIBASIC 236; 224 MG/ML; MG/ML
15 INJECTION, SOLUTION INTRAVENOUS ONCE
Refills: 0 | Status: COMPLETED | OUTPATIENT
Start: 2022-05-22 | End: 2022-05-22

## 2022-05-22 RX ORDER — SODIUM CHLORIDE 9 MG/ML
1000 INJECTION INTRAMUSCULAR; INTRAVENOUS; SUBCUTANEOUS
Refills: 0 | Status: DISCONTINUED | OUTPATIENT
Start: 2022-05-22 | End: 2022-05-24

## 2022-05-22 RX ORDER — ONDANSETRON 8 MG/1
4 TABLET, FILM COATED ORAL EVERY 8 HOURS
Refills: 0 | Status: DISCONTINUED | OUTPATIENT
Start: 2022-05-22 | End: 2022-05-24

## 2022-05-22 RX ADMIN — SODIUM CHLORIDE 65 MILLILITER(S): 9 INJECTION INTRAMUSCULAR; INTRAVENOUS; SUBCUTANEOUS at 04:26

## 2022-05-22 RX ADMIN — POTASSIUM PHOSPHATE, MONOBASIC POTASSIUM PHOSPHATE, DIBASIC 62.5 MILLIMOLE(S): 236; 224 INJECTION, SOLUTION INTRAVENOUS at 12:50

## 2022-05-22 RX ADMIN — Medication 40 MILLIEQUIVALENT(S): at 17:27

## 2022-05-22 RX ADMIN — Medication 40 MILLIEQUIVALENT(S): at 21:05

## 2022-05-22 RX ADMIN — ENOXAPARIN SODIUM 40 MILLIGRAM(S): 100 INJECTION SUBCUTANEOUS at 06:41

## 2022-05-22 RX ADMIN — ENOXAPARIN SODIUM 40 MILLIGRAM(S): 100 INJECTION SUBCUTANEOUS at 17:28

## 2022-05-22 RX ADMIN — Medication 50 MICROGRAM(S): at 06:42

## 2022-05-22 RX ADMIN — CEFTRIAXONE 100 MILLIGRAM(S): 500 INJECTION, POWDER, FOR SOLUTION INTRAMUSCULAR; INTRAVENOUS at 09:58

## 2022-05-22 RX ADMIN — Medication 100 MILLIEQUIVALENT(S): at 01:14

## 2022-05-22 RX ADMIN — Medication 100 MILLIEQUIVALENT(S): at 12:52

## 2022-05-22 RX ADMIN — Medication 40 MILLIEQUIVALENT(S): at 17:23

## 2022-05-22 RX ADMIN — Medication 100 MILLIEQUIVALENT(S): at 02:34

## 2022-05-22 RX ADMIN — SODIUM CHLORIDE 500 MILLILITER(S): 9 INJECTION INTRAMUSCULAR; INTRAVENOUS; SUBCUTANEOUS at 03:27

## 2022-05-22 NOTE — PATIENT PROFILE ADULT - FALL HARM RISK - HARM RISK INTERVENTIONS

## 2022-05-22 NOTE — PROGRESS NOTE ADULT - PROBLEM SELECTOR PLAN 2
pt K 2.6 on admission, s/p 3 riders, K 2.8  likely 2/2 decrease PO intake  Will give PO K + 3 riders  f/u BMP

## 2022-05-22 NOTE — PROGRESS NOTE ADULT - SUBJECTIVE AND OBJECTIVE BOX
PGY-1 Progress Note discussed with attending    PAGER #: [[phone]] TILL 5:00 PM  PLEASE CONTACT ON CALL TEAM:  - On Call Team (Please refer to Patricio) FROM 5:00 PM - 8:30PM  - Nightfloat Team FROM 8:30 -7:30 AM      INTERVAL HPI/OVERNIGHT EVENTS: no acute overnight events, patient was seen and examined at bedside, in NAD, and has no complaints       REVIEW OF SYSTEMS:  CONSTITUTIONAL: No fever, weight loss, or fatigue  RESPIRATORY: No cough, wheezing, chills or hemoptysis; No shortness of breath  CARDIOVASCULAR: No chest pain, palpitations, dizziness, or leg swelling  GASTROINTESTINAL: No abdominal pain. No nausea, vomiting, or hematemesis; No diarrhea or constipation. No melena or hematochezia.  GENITOURINARY: No dysuria or hematuria, urinary frequency  NEUROLOGICAL: No headaches, memory loss, loss of strength, numbness, or tremors  SKIN: No itching, burning, rashes, or lesions     Vital Signs Last 24 Hrs  T(C): 36.9 (22 May 2022 10:48), Max: 36.9 (22 May 2022 10:48)  T(F): 98.4 (22 May 2022 10:48), Max: 98.4 (22 May 2022 10:48)  HR: 63 (22 May 2022 10:48) (63 - 74)  BP: 123/52 (22 May 2022 10:48) (109/70 - 137/45)  BP(mean): --  RR: 17 (22 May 2022 10:48) (17 - 18)  SpO2: 95% (22 May 2022 10:48) (95% - 100%)    PHYSICAL EXAMINATION:  GENERAL: NAD,   HEAD:  Atraumatic, Normocephalic  EYES:  conjunctiva and sclera clear  NECK: Supple, No JVD, Normal thyroid  CHEST/LUNG: Clear to auscultation. Clear to percussion bilaterally; No rales, rhonchi, wheezing, or rubs  HEART: Regular rate and rhythm; No murmurs, rubs, or gallops  ABDOMEN: Soft, Nontender, Nondistended; Bowel sounds present  NERVOUS SYSTEM:  Alert & Oriented X1, answering questions appropriately   EXTREMITIES:  2+ Peripheral Pulses, No clubbing, cyanosis, or edema  SKIN: warm dry                          12.6   8.50  )-----------( 340      ( 22 May 2022 10:43 )             40.3     05-22    144  |  106  |  10  ----------------------------<  93  2.6<LL>   |  31  |  0.58    Ca    9.0      22 May 2022 10:43  Phos  2.2     05-22  Mg     2.1     05-22    TPro  7.1  /  Alb  2.2<L>  /  TBili  1.1  /  DBili  x   /  AST  13  /  ALT  9<L>  /  AlkPhos  60  05-21    LIVER FUNCTIONS - ( 21 May 2022 20:09 )  Alb: 2.2 g/dL / Pro: 7.1 g/dL / ALK PHOS: 60 U/L / ALT: 9 U/L DA / AST: 13 U/L / GGT: x                   CAPILLARY BLOOD GLUCOSE      RADIOLOGY & ADDITIONAL TESTS:

## 2022-05-22 NOTE — PROGRESS NOTE ADULT - PROBLEM SELECTOR PLAN 5
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

## 2022-05-22 NOTE — PROGRESS NOTE ADULT - PROBLEM SELECTOR PLAN 3
F/u TSH  C/w home dose of Synthroid Pt has a history of Sarcoidosis, corrected Ca 10.5   Check Vitamin D, PTH, ACEI  F/u Echo

## 2022-05-22 NOTE — PROGRESS NOTE ADULT - PROBLEM SELECTOR PLAN 4
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

## 2022-05-22 NOTE — PATIENT PROFILE ADULT - NSTRANSFERBELONGINGSRESP_GEN_A_NUR
Person Calling/Reporting Symptoms: patient  Onset: Twice in the last two weeks  Positive Symptoms: Left leg pain behind left knee, not constant, happened twice in last two weeks after prolonged. Rated pain as 10/10 when it happened. However,  Improved with walking.    Negative Symptoms: Denies swelling, warmth, redness to the area. Denies calf, ankle or thigh swelling. Denies CP, SOB.  Pain Scale: currently, 2/10.    Pertinent Patient History/Medications :   Patient drives frequently for a living. As not had an prolonged driving/sitting greater than 6 hours. Patient states he tried taking an Asprin with little relief.     Recommendations:   I advised that patient continue to monitor and will have Therese Gay MD advise if any other evaluation/treatment is needed.    Preferred Pharmacy Selected No    MD Advised: Yes  Triage Protocol Used: Yes   yes

## 2022-05-22 NOTE — PROGRESS NOTE ADULT - PROBLEM SELECTOR PLAN 5
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

## 2022-05-22 NOTE — PROGRESS NOTE ADULT - SUBJECTIVE AND OBJECTIVE BOX
PATIENT SEEN AND EXAMINED ON :- 5/22/22  DATE OF SERVICE:     5/22/22        Interim events noted,Labs ,Radiological studies and Cardiology tests reviewed .       HOSPITAL COURSE: HPI:  Patient is a 75 y/o female, from home, baseline  and ambulatory w/ wheelchair, with significant medical history of Sarcoidosis, and Hypothyroidism who was BIBEMS after she had lost consciousness in a sitting position and slipped from her wheelchair. Patient is pleasant on interview and reports that she has  no recollection of the event; she only remembers waking up in the ambulance. Collateral history was collected from daughter Dariana who reports that the patient was sitting in her wheelchair when she had a sudden LOC and slumped to the floor without headstrike or other trauma. Despite trying to awaken her, she remained unconscious for >15 minutes according to her family. no tongue bite, urinary incontinence, tonic clonic movements, or preceding dysarthria or facial droop. Patient only complains of lower abdominal pain, less PO intake for a few days due to nausea, and dysuria w/ burning. She denies any headaches, visual disturbances, V/D, dizziness, cough, chest pain, palpitations, lower extremity swelling, or fevers. No new medications except a dementia medication which the daughter is unable to remember the name of.  (21 May 2022 23:54)      INTERIM EVENTS:Patient seen at bedside ,interim events noted.      PMH -reviewed admission note, no change since admission  HEART FAILURE: Acute[ ]Chronic[ ] Systolic[ ] Diastolic[ ] Combined Systolic and Diastolic[ ]  CAD[ ] CABG[ ] PCI[ ]  DEVICES[ ] PPM[ ] ICD[ ] ILR[ ]  ATRIAL FIBRILLATION[ ] Paroxysmal[ ] Permanent[ ]  VARGAS[ ] CKD1[ ] CKD2[ ] CKD3[ ] CKD4[ ] ESRD[ ]  COPD[ ] HTN[ ]   DM[ ] Type1[ ] Type 2[ ]   CVA[ ] Paresis[ ]    AMBULATION: Assisted[ ] Cane/walker[ ] Independent[ ]    MEDICATIONS  (STANDING):  cefTRIAXone   IVPB 1000 milliGRAM(s) IV Intermittent every 24 hours  enoxaparin Injectable 40 milliGRAM(s) SubCutaneous every 12 hours  levothyroxine 50 MICROGram(s) Oral daily  potassium chloride    Tablet ER 40 milliEquivalent(s) Oral every 4 hours  potassium chloride  10 mEq/100 mL IVPB 10 milliEquivalent(s) IV Intermittent every 1 hour  sodium chloride 0.9%. 1000 milliLiter(s) (65 mL/Hr) IV Continuous <Continuous>    MEDICATIONS  (PRN):  acetaminophen     Tablet .. 650 milliGRAM(s) Oral every 6 hours PRN Temp greater or equal to 38C (100.4F), Mild Pain (1 - 3)  ondansetron Injectable 4 milliGRAM(s) IV Push every 8 hours PRN Nausea and/or Vomiting            REVIEW OF SYSTEMS:  Constitutional: [ ] fever, [ ]weight loss,  [ ]fatigue  Eyes: [ ] visual changes  Respiratory: [ ]shortness of breath;  [ ] cough, [ ]wheezing, [ ]chills, [ ]hemoptysis  Cardiovascular: [ ] chest pain, [ ]palpitations, [ ]dizziness,  [ ]leg swelling[ ]orthopnea[ ]PND  Gastrointestinal: [ ] abdominal pain, [ ]nausea, [ ]vomiting,  [ ]diarrhea [ ]Constipation [ ]Melena  Genitourinary: [ ] dysuria, [ ] hematuria [ ][place]o  Neurologic: [ ] headaches [ ] tremors[ ]weakness [ ]Paralysis Right[ ] Left[ ]  Skin: [ ] itching, [ ]burning, [ ] rashes  Endocrine: [ ] heat or cold intolerance  Musculoskeletal: [ ] joint pain or swelling; [ ] muscle, back, or extremity pain  Psychiatric: [ ] depression, [ ]anxiety, [ ]mood swings, or [ ]difficulty sleeping  Hematologic: [ ] easy bruising, [ ] bleeding gums    [ ] All remaining systems negative except as per above.   [ ]Unable to obtain.  [x] No change in ROS since admission      Vital Signs Last 24 Hrs  T(C): 36.6 (22 May 2022 16:11), Max: 36.9 (22 May 2022 10:48)  T(F): 97.9 (22 May 2022 16:11), Max: 98.4 (22 May 2022 10:48)  HR: 80 (22 May 2022 16:11) (63 - 80)  BP: 109/45 (22 May 2022 16:11) (109/45 - 137/45)  BP(mean): --  RR: 17 (22 May 2022 16:11) (17 - 18)  SpO2: 95% (22 May 2022 10:48) (95% - 100%)  I&O's Summary      PHYSICAL EXAM:  General: No acute distress BMI-  HEENT: EOMI, PERRL  Neck: Supple, [ ] JVD  Lungs: Equal air entry bilaterally; [ ] rales [ ] wheezing [ ] rhonchi  Heart: Regular rate and rhythm; [x ] murmur   2/6 [ x] systolic [ ] diastolic [ ] radiation[ ] rubs [ ]  gallops  Abdomen: Nontender, bowel sounds present  Extremities: No clubbing, cyanosis, [ ] edema [ ]Pulses  equal and intact  Nervous system:  Alert & Oriented X3, no focal deficits  Psychiatric: Normal affect  Skin: No rashes or lesions    LABS:  05-22    144  |  106  |  10  ----------------------------<  93  2.6<LL>   |  31  |  0.58    Ca    9.0      22 May 2022 10:43  Phos  2.2     05-22  Mg     2.1     05-22    TPro  7.1  /  Alb  2.2<L>  /  TBili  1.1  /  DBili  x   /  AST  13  /  ALT  9<L>  /  AlkPhos  60  05-21    Creatinine Trend: 0.58<--, 0.69<--                        12.6   8.50  )-----------( 340      ( 22 May 2022 10:43 )             40.3

## 2022-05-22 NOTE — PROGRESS NOTE ADULT - PROBLEM SELECTOR PLAN 2
Pt has a history of Sarcoidosis, corrected Ca 10.5   Check Vitamin D, PTH, ACEI  F/u Echo pt K 2.6 on admission, s/p 3 riders, K 2.8  likely 2/2 decrease PO intake  Will give PO K + 3 riders  f/u BMP

## 2022-05-22 NOTE — PROGRESS NOTE ADULT - ATTENDING COMMENTS
Patient is a 75 y/o female, from home, baseline  and ambulatory w/ wheelchair, with PMH of Sarcoidosis, and Hypothyroidism who was BIBEMS after she had lost consciousness in a sitting position and slipped from her wheelchair witnessed by her daughter. Patient is pleasant on interview and reports that she has no recollection of the event; she only remembers waking up in the ambulance. Collateral history was collected from daughter Dariana who reports that the patient was sitting in her wheelchair when she had a sudden LOC and slumped to the floor without headstrike or other trauma. Despite trying to awaken her, she remained unconscious for >15 minutes according to her family. no tongue bite, urinary incontinence, tonic clonic movements, or preceding dysarthria or facial droop. Patient only complains of lower abdominal pain, less PO intake for a few days due to nausea, and dysuria w/ burning. She denies any headaches, visual disturbances, V/D, dizziness, cough, chest pain, palpitations, lower extremity swelling, or fevers.   Of note patient was admitted in Jan 2022, for syncope, was thought to be due to metabolic encephalopathy 2/2 COVID +uncontrolled hypothyroidism.    patient AAOx2-3, afebrile, pleasant affect, no FND.  labs wbc 13k, potassium 2.8, alb 2.2.  EKG T wave inv in lateral leads, same as prior EKG.     Vital Signs Last 24 Hrs  T(C): 36.8 (22 May 2022 20:17), Max: 36.9 (22 May 2022 10:48)  T(F): 98.3 (22 May 2022 20:17), Max: 98.4 (22 May 2022 10:48)  HR: 68 (22 May 2022 20:17) (63 - 80)  BP: 131/64 (22 May 2022 20:17) (109/45 - 137/45)  RR: 18 (22 May 2022 20:17) (17 - 18)  SpO2: 95% (22 May 2022 10:48) (95% - 100%)    P/E: as above    Labs:             12.6   8.50  )-----------( 340      ( 22 May 2022 10:43 )             40.3   05-22    144  |  106  |  10  ----------------------------<  93  2.6<LL>   |  31  |  0.58    Ca    9.0      22 May 2022 10:43  Phos  2.2     05-22  Mg     2.1     05-22    TPro  7.1  /  Alb  2.2<L>  /  TBili  1.1  /  DBili  x   /  AST  13  /  ALT  9<L>  /  AlkPhos  60  05-21      CT head:  No acute intracranial hemorrhage, territorial infarct or mass effect.    Assessment and plan: Patient is a 75 y/o female, from home, baseline  and ambulatory w/ wheelchair, with PMH of Sarcoidosis, and Hypothyroidism who was BIBEMS after she had lost consciousness in a sitting position being admitted for syncope workup.    Syncope with suspected LOC (hard to determine given Dementia)  Severe Hypokalemia  Hx sarcoidosis  Hypothyroidism    - p/w with witnessed episode of syncope, while sitting. admitted in jan with syncope.  - reports decreased oral intake, reports nausea, dysuria.  - Etio: hypovolemia, vs. hypokalemia induced ?,/arrhythmia/ cardiogenic. h/o sarcoidosis.   - hypokalemic, EKG T wave inv in lateral leads, same as prior. CT head neg for acute abn.  - orthostatic vitals negative noted,  - telemonitoring.  - ECHO  - check vit D, PTH hx of sarcoidosis, corrected calcium 10.3.  - potassium replaced, repeat bmp.  - reports dysuria, mild elevated wbc, afebrile. send UA. ( h/o ESBL UTI).  - was noted to have uncontrolled hypothyroidism last admission, reports compliance to meds, check TSH   - resume levothyroxine 50 mcg Patient seen and examined with PGY1 this morning    75 y/o female, from home, baseline dementia, AA0 x2 and ambulatory w/ wheelchair, with PMH of Sarcoidosis, and Hypothyroidism who was BIBEMS after she had lost consciousness in a sitting position and slipped from her wheelchair witnessed by her daughter on floor for 15 mins. Patient has underlying Dementia with no recollection of the event;  no tongue bite, urinary incontinence, tonic clonic movements, or preceding dysarthria or facial droop. Patient only complains of lower abdominal pain, less PO intake for a few days due to nausea, and dysuria w/ burning. She denies any headaches, visual disturbances, V/D, dizziness, cough, chest pain, palpitations, lower extremity swelling, or fevers.   Of note patient was admitted in Jan 2022, for syncope, was thought to be due to metabolic encephalopathy 2/2 COVID +uncontrolled hypothyroidism. Hx CRE     Patient noted to have severe hypokalemia in ED     Vital Signs Last 24 Hrs  T(C): 36.8 (22 May 2022 20:17), Max: 36.9 (22 May 2022 10:48)  T(F): 98.3 (22 May 2022 20:17), Max: 98.4 (22 May 2022 10:48)  HR: 68 (22 May 2022 20:17) (63 - 80)  BP: 131/64 (22 May 2022 20:17) (109/45 - 137/45)  RR: 18 (22 May 2022 20:17) (17 - 18)  SpO2: 95% (22 May 2022 10:48) (95% - 100%)    P/E: as above  Psych: AAO x2; baseline Dementia??  Neuro: No gross focal deficits; Power and sensation intact LE 4/5  CVS: S1S2 present, regular, no edema  Resp: BLAE+, No wheeze or Rhonchi  GI: Soft, BS+, Non tender, non distended  Extr: No  calf tenderness B/L Lower extremities  Skin: Warm and moist without any rashes    Labs:             12.6   8.50  )-----------( 340      ( 22 May 2022 10:43 )             40.3   05-22    144  |  106  |  10  ----------------------------<  93  2.6<LL>   |  31  |  0.58    Ca    9.0      22 May 2022 10:43  Phos  2.2     05-22  Mg     2.1     05-22  TPro  7.1  /  Alb  2.2<L>  /  TBili  1.1  /  DBili  x   /  AST  13  /  ALT  9<L>  /  AlkPhos  60  05-21    Thyroid Stimulating Hormone, Serum (05.22.22 @ 10:43) Thyroid Stimulating Hormone, Serum: 2.72 uU/mL     CT Head No Cont (05.21.22 @ 22:11)     FINDINGS:  Mild parenchymal volume loss is noted with prominent ventricles and sulci. Chronic microvascular ischemic changes are identified. No acute   territorial infarct is demonstrated.  There is no evidence of an acute hemorrhage or mass-effect in the posterior fossa or in the supratentorial region. Senescent bilateral basal ganglia calcifications are noted.  Evaluation of the osseous structures with the appropriate window appears unremarkable. Vascular calcifications are noted. The visualized paranasal   sinuses and mastoid air cells are clear.    IMPRESSION: No acute intracranial hemorrhage, territorial infarct or mass effect.    Assessment and plan: Patient is a 75 y/o female, from home, baseline AAO x2 and ambulatory with wheelchair, with PMH of Sarcoidosis, and Hypothyroidism who was BIBEMS after she had lost consciousness in a sitting position admitted for syncope with possible LOC for approx 15 mins    D/D:  Syncope with suspected LOC (hard to determine given Dementia) ?? Arrhythmia vs Neurogenic like seizure activity  suspected UTI although afebrile, no leucocytosis  Severe Hypokalemia etiology ??poor oral intake  Hx sarcoidosis  Hypothyroidism  Mild Hypercalcemia  Modrate Protein Calorie malnutrition    Plan:  Continue Tele; Cardio eval noted; f/u ECHO  Given prolonged LOC reported although hard to confirm with underlying dementia, will get EEG  Neurology consult   Orthostatic vitals negative on admission  F/U vit D, PTH hx of sarcoidosis, corrected calcium 10.3.  Replace potassium; will give oral 40 meq x 2 doses; got almost 2 riders  Urine Cx please; Straight Cath d/w RN  Previously poorly controlled Hypothyroid state TSH WNL   Continue Levothyroxine 50 mcg daily  Add Ensure Enlive BID  DVT ppx    Discussed with PGY1 DR. Whitmore;   Discussed with MIKE Cox

## 2022-05-23 DIAGNOSIS — R40.20 UNSPECIFIED COMA: ICD-10-CM

## 2022-05-23 LAB
ACE SERPL-CCNC: 34 U/L — SIGNIFICANT CHANGE UP (ref 14–82)
ANION GAP SERPL CALC-SCNC: 5 MMOL/L — SIGNIFICANT CHANGE UP (ref 5–17)
BUN SERPL-MCNC: 9 MG/DL — SIGNIFICANT CHANGE UP (ref 7–18)
CALCIUM SERPL-MCNC: 8.6 MG/DL — SIGNIFICANT CHANGE UP (ref 8.4–10.5)
CHLORIDE SERPL-SCNC: 111 MMOL/L — HIGH (ref 96–108)
CO2 SERPL-SCNC: 29 MMOL/L — SIGNIFICANT CHANGE UP (ref 22–31)
CREAT SERPL-MCNC: 0.48 MG/DL — LOW (ref 0.5–1.3)
EGFR: 98 ML/MIN/1.73M2 — SIGNIFICANT CHANGE UP
GLUCOSE SERPL-MCNC: 86 MG/DL — SIGNIFICANT CHANGE UP (ref 70–99)
HCT VFR BLD CALC: 35.5 % — SIGNIFICANT CHANGE UP (ref 34.5–45)
HGB BLD-MCNC: 11.2 G/DL — LOW (ref 11.5–15.5)
MCHC RBC-ENTMCNC: 28.6 PG — SIGNIFICANT CHANGE UP (ref 27–34)
MCHC RBC-ENTMCNC: 31.5 GM/DL — LOW (ref 32–36)
MCV RBC AUTO: 90.8 FL — SIGNIFICANT CHANGE UP (ref 80–100)
NRBC # BLD: 0 /100 WBCS — SIGNIFICANT CHANGE UP (ref 0–0)
PLATELET # BLD AUTO: 328 K/UL — SIGNIFICANT CHANGE UP (ref 150–400)
POTASSIUM SERPL-MCNC: 3.9 MMOL/L — SIGNIFICANT CHANGE UP (ref 3.5–5.3)
POTASSIUM SERPL-SCNC: 3.9 MMOL/L — SIGNIFICANT CHANGE UP (ref 3.5–5.3)
RBC # BLD: 3.91 M/UL — SIGNIFICANT CHANGE UP (ref 3.8–5.2)
RBC # FLD: 15.1 % — HIGH (ref 10.3–14.5)
SODIUM SERPL-SCNC: 145 MMOL/L — SIGNIFICANT CHANGE UP (ref 135–145)
VIT D25+D1,25 OH+D1,25 PNL SERPL-MCNC: 21.1 PG/ML — SIGNIFICANT CHANGE UP (ref 19.9–79.3)
WBC # BLD: 9.25 K/UL — SIGNIFICANT CHANGE UP (ref 3.8–10.5)
WBC # FLD AUTO: 9.25 K/UL — SIGNIFICANT CHANGE UP (ref 3.8–10.5)

## 2022-05-23 PROCEDURE — 95819 EEG AWAKE AND ASLEEP: CPT | Mod: 26

## 2022-05-23 PROCEDURE — 99223 1ST HOSP IP/OBS HIGH 75: CPT

## 2022-05-23 PROCEDURE — 99233 SBSQ HOSP IP/OBS HIGH 50: CPT | Mod: GC

## 2022-05-23 RX ADMIN — Medication 50 MICROGRAM(S): at 05:41

## 2022-05-23 RX ADMIN — ENOXAPARIN SODIUM 40 MILLIGRAM(S): 100 INJECTION SUBCUTANEOUS at 05:42

## 2022-05-23 RX ADMIN — ENOXAPARIN SODIUM 40 MILLIGRAM(S): 100 INJECTION SUBCUTANEOUS at 17:55

## 2022-05-23 RX ADMIN — CEFTRIAXONE 100 MILLIGRAM(S): 500 INJECTION, POWDER, FOR SOLUTION INTRAMUSCULAR; INTRAVENOUS at 12:40

## 2022-05-23 NOTE — PHYSICAL THERAPY INITIAL EVALUATION ADULT - MANUAL MUSCLE TESTING RESULTS, REHAB EVAL
MMT on both upper extremities are grossly graded 4/5; Both lower extremities are grossly graded 3-/5

## 2022-05-23 NOTE — PHYSICAL THERAPY INITIAL EVALUATION ADULT - ADDITIONAL COMMENTS
as per discussion with her daughter Dariana, patient has gradually declined in mobility and function and has since been using a mechanical lift for bed and chair transfers, as well as a wheelchair for mobility

## 2022-05-23 NOTE — PROGRESS NOTE ADULT - SUBJECTIVE AND OBJECTIVE BOX
PGY-1 Progress Note discussed with attending    PAGER #: [[phone]] TILL 5:00 PM  PLEASE CONTACT ON CALL TEAM:  - On Call Team (Please refer to Patricio) FROM 5:00 PM - 8:30PM  - Nightfloat Team FROM 8:30 -7:30 AM      INTERVAL HPI/OVERNIGHT EVENTS: no acute overnight events, patient is more awake and alert today.       REVIEW OF SYSTEMS:  CONSTITUTIONAL: No fever, weight loss, or fatigue  RESPIRATORY: No cough, wheezing, chills or hemoptysis; No shortness of breath  CARDIOVASCULAR: No chest pain, palpitations, dizziness, or leg swelling  GASTROINTESTINAL: No abdominal pain. No nausea, vomiting, or hematemesis; No diarrhea or constipation. No melena or hematochezia.  GENITOURINARY: No dysuria or hematuria, urinary frequency  NEUROLOGICAL: No headaches, memory loss, loss of strength, numbness, or tremors  SKIN: No itching, burning, rashes, or lesions     Vital Signs Last 24 Hrs  T(C): 36.6 (23 May 2022 11:55), Max: 37 (23 May 2022 04:23)  T(F): 97.8 (23 May 2022 11:55), Max: 98.6 (23 May 2022 04:23)  HR: 60 (23 May 2022 12:02) (57 - 80)  BP: 117/62 (23 May 2022 12:02) (109/45 - 136/66)  BP(mean): 74 (23 May 2022 12:02) (74 - 75)  RR: 18 (23 May 2022 12:02) (17 - 18)  SpO2: 97% (23 May 2022 12:02) (97% - 100%)    PHYSICAL EXAMINATION:  GENERAL: NAD,   HEAD:  Atraumatic, Normocephalic  EYES:  conjunctiva and sclera clear  NECK: Supple, No JVD, Normal thyroid  CHEST/LUNG: Clear to auscultation. Clear to percussion bilaterally; No rales, rhonchi, wheezing, or rubs  HEART: Regular rate and rhythm; No murmurs, rubs, or gallops  ABDOMEN: Soft, Nontender, Nondistended; Bowel sounds present  NERVOUS SYSTEM:  Alert & Oriented X2, following commands   EXTREMITIES:  2+ Peripheral Pulses, No clubbing, cyanosis, or edema  SKIN: warm dry                          11.2   9.25  )-----------( 328      ( 23 May 2022 07:13 )             35.5     05-23    145  |  111<H>  |  9   ----------------------------<  86  3.9   |  29  |  0.48<L>    Ca    8.6      23 May 2022 07:13  Phos  2.2     05-22  Mg     2.1     05-22    TPro  7.1  /  Alb  2.2<L>  /  TBili  1.1  /  DBili  x   /  AST  13  /  ALT  9<L>  /  AlkPhos  60  05-21    LIVER FUNCTIONS - ( 21 May 2022 20:09 )  Alb: 2.2 g/dL / Pro: 7.1 g/dL / ALK PHOS: 60 U/L / ALT: 9 U/L DA / AST: 13 U/L / GGT: x                   CAPILLARY BLOOD GLUCOSE      RADIOLOGY & ADDITIONAL TESTS:

## 2022-05-23 NOTE — PROGRESS NOTE ADULT - SUBJECTIVE AND OBJECTIVE BOX
PATIENT SEEN AND EXAMINED ON :-5/23/22  DATE OF SERVICE:     5/23/22        Interim events noted,Labs ,Radiological studies and Cardiology tests reviewed .     HOSPITAL COURSE: HPI:  Patient is a 75 y/o female, from home, baseline  and ambulatory w/ wheelchair, with significant medical history of Sarcoidosis, and Hypothyroidism who was BIBEMS after she had lost consciousness in a sitting position and slipped from her wheelchair. Patient is pleasant on interview and reports that she has  no recollection of the event; she only remembers waking up in the ambulance. Collateral history was collected from daughter Dariana who reports that the patient was sitting in her wheelchair when she had a sudden LOC and slumped to the floor without headstrike or other trauma. Despite trying to awaken her, she remained unconscious for >15 minutes according to her family. no tongue bite, urinary incontinence, tonic clonic movements, or preceding dysarthria or facial droop. Patient only complains of lower abdominal pain, less PO intake for a few days due to nausea, and dysuria w/ burning. She denies any headaches, visual disturbances, V/D, dizziness, cough, chest pain, palpitations, lower extremity swelling, or fevers. No new medications except a dementia medication which the daughter is unable to remember the name of.  (21 May 2022 23:54)      INTERIM EVENTS:Patient seen at bedside ,interim events noted.      PMH -reviewed admission note, no change since admission  HEART FAILURE: Acute[ ]Chronic[ ] Systolic[ ] Diastolic[ ] Combined Systolic and Diastolic[ ]  CAD[ ] CABG[ ] PCI[ ]  DEVICES[ ] PPM[ ] ICD[ ] ILR[ ]  ATRIAL FIBRILLATION[ ] Paroxysmal[ ] Permanent[ ]  VARGAS[ ] CKD1[ ] CKD2[ ] CKD3[ ] CKD4[ ] ESRD[ ]  COPD[ ] HTN[ ]   DM[ ] Type1[ ] Type 2[ ]   CVA[ ] Paresis[ ]    AMBULATION: Assisted[ ] Cane/walker[ ] Independent[ ]    MEDICATIONS  (STANDING):  cefTRIAXone   IVPB 1000 milliGRAM(s) IV Intermittent every 24 hours  enoxaparin Injectable 40 milliGRAM(s) SubCutaneous every 12 hours  levothyroxine 50 MICROGram(s) Oral daily  sodium chloride 0.9%. 1000 milliLiter(s) (65 mL/Hr) IV Continuous <Continuous>    MEDICATIONS  (PRN):  acetaminophen     Tablet .. 650 milliGRAM(s) Oral every 6 hours PRN Temp greater or equal to 38C (100.4F), Mild Pain (1 - 3)  ondansetron Injectable 4 milliGRAM(s) IV Push every 8 hours PRN Nausea and/or Vomiting            REVIEW OF SYSTEMS:  Constitutional: [ ] fever, [ ]weight loss,  [ ]fatigue  Eyes: [ ] visual changes  Respiratory: [ ]shortness of breath;  [ ] cough, [ ]wheezing, [ ]chills, [ ]hemoptysis  Cardiovascular: [ ] chest pain, [ ]palpitations, [ ]dizziness,  [ ]leg swelling[ ]orthopnea[ ]PND  Gastrointestinal: [ ] abdominal pain, [ ]nausea, [ ]vomiting,  [ ]diarrhea [ ]Constipation [ ]Melena  Genitourinary: [ ] dysuria, [ ] hematuria [ ][place]o  Neurologic: [ ] headaches [ ] tremors[ ]weakness [ ]Paralysis Right[ ] Left[ ]  Skin: [ ] itching, [ ]burning, [ ] rashes  Endocrine: [ ] heat or cold intolerance  Musculoskeletal: [ ] joint pain or swelling; [ ] muscle, back, or extremity pain  Psychiatric: [ ] depression, [ ]anxiety, [ ]mood swings, or [ ]difficulty sleeping  Hematologic: [ ] easy bruising, [ ] bleeding gums    [ ] All remaining systems negative except as per above.   [ ]Unable to obtain.  [x] No change in ROS since admission      Vital Signs Last 24 Hrs  T(C): 36.7 (23 May 2022 15:49), Max: 37 (23 May 2022 04:23)  T(F): 98 (23 May 2022 15:49), Max: 98.6 (23 May 2022 04:23)  HR: 62 (23 May 2022 15:49) (57 - 68)  BP: 112/64 (23 May 2022 15:49) (112/64 - 136/66)  BP(mean): 74 (23 May 2022 12:02) (74 - 75)  RR: 18 (23 May 2022 15:49) (17 - 18)  SpO2: 98% (23 May 2022 15:49) (97% - 100%)  I&O's Summary      PHYSICAL EXAM:  General: No acute distress BMI-  HEENT: EOMI, PERRL  Neck: Supple, [ ] JVD  Lungs: Equal air entry bilaterally; [ ] rales [ ] wheezing [ ] rhonchi  Heart: Regular rate and rhythm; [x ] murmur   2/6 [ x] systolic [ ] diastolic [ ] radiation[ ] rubs [ ]  gallops  Abdomen: Nontender, bowel sounds present  Extremities: No clubbing, cyanosis, [ ] edema [ ]Pulses  equal and intact  Nervous system:  Alert & Oriented X3, no focal deficits  Psychiatric: Normal affect  Skin: No rashes or lesions    LABS:  05-23    145  |  111<H>  |  9   ----------------------------<  86  3.9   |  29  |  0.48<L>    Ca    8.6      23 May 2022 07:13  Phos  2.2     05-22  Mg     2.1     05-22    TPro  7.1  /  Alb  2.2<L>  /  TBili  1.1  /  DBili  x   /  AST  13  /  ALT  9<L>  /  AlkPhos  60  05-21    Creatinine Trend: 0.48<--, 0.53<--, 0.58<--, 0.69<--                        11.2   9.25  )-----------( 328      ( 23 May 2022 07:13 )             35.5

## 2022-05-23 NOTE — DISCHARGE NOTE PROVIDER - HOSPITAL COURSE
Patient is a 77 y/o female, from home, baseline  and ambulatory w/ wheelchair, with significant medical history of Sarcoidosis, and Hypothyroidism who was BIBEMS after she had lost consciousness in a sitting position and slipped from her wheelchair. Admitted for syncope workup. Pt had LOC for 15 minutes. CTH was negative, and EEG showed no evidence of seizures. Orthostatics were mildly positive , and ECHO showed grade 1 DD, EF. Neurology was consulted and MRI was negative for any infarcts. Syncope likely 2/2 orthostats due to decrease PO intake. For sarcoidos, Vitamin D, PTH, ACEI  are WNL. For Hypothyroidism  synthroid was continued.     Please follow up with your primary care physician in one week to inform them of your recent hospitalization and further management of your medical conditions.

## 2022-05-23 NOTE — PHYSICAL THERAPY INITIAL EVALUATION ADULT - DIAGNOSIS, PT EVAL
none - patient is functioning at her baseline. Patient utilizes a mechanical lift and wheelchair for transfers and mobility at home.

## 2022-05-23 NOTE — PROGRESS NOTE ADULT - ATTENDING COMMENTS
Patient seen and examined  this morning    75 y/o female, from home, baseline dementia, AA0 x2 and ambulatory w/ wheelchair, with PMH of Sarcoidosis, and Hypothyroidism who was BIBEMS after she had lost consciousness in a sitting position and slipped from her wheelchair witnessed by her daughter on floor for 15 mins. Patient has underlying Dementia with no recollection of the event;  no tongue bite, urinary incontinence, tonic clonic movements, or preceding dysarthria or facial droop. Patient only complains of lower abdominal pain, less PO intake for a few days due to nausea, and dysuria w/ burning. She denies any headaches, visual disturbances, V/D, dizziness, cough, chest pain, palpitations, lower extremity swelling, or fevers.   Of note patient was admitted in Jan 2022, for syncope, was thought to be due to metabolic encephalopathy 2/2 COVID +uncontrolled hypothyroidism. Hx CRE     Patient noted to have severe hypokalemia in ED now better;   Patient is doing significantly better today mentation wise; much more alert and awake; fully co-operative;     Vital Signs Last 24 Hrs  T(C): 36.7 (23 May 2022 15:49), Max: 37 (23 May 2022 04:23)  T(F): 98 (23 May 2022 15:49), Max: 98.6 (23 May 2022 04:23)  HR: 62 (23 May 2022 15:49) (57 - 68)  BP: 112/64 (23 May 2022 15:49) (112/64 - 136/66)  BP(mean): 74 (23 May 2022 12:02) (74 - 75)  RR: 18 (23 May 2022 15:49) (17 - 18)  SpO2: 98% (23 May 2022 15:49) (97% - 100%)    P/E: as above  Psych: AAO x2; baseline Dementia??  Neuro: No gross focal deficits; Power and sensation intact LE 4/5  CVS: S1S2 present, regular, no edema  Resp: BLAE+, No wheeze or Rhonchi  GI: Soft, BS+, Non tender, non distended  Extr: No  calf tenderness B/L Lower extremities  Skin: Warm and moist without any rashes    Labs:                11.2   9.25  )-----------( 328      ( 23 May 2022 07:13 )             35.5     05-23    145  |  111<H>  |  9   ----------------------------<  86  3.9   |  29  |  0.48<L>  Ca    8.6      23 May 2022 07:13  Phos  2.2     05-22  Mg     2.1     05-22  TPro  7.1  /  Alb  2.2<L>  /  TBili  1.1  /  DBili  x   /  AST  13  /  ALT  9<L>  /  AlkPhos  60  05-21    Thyroid Stimulating Hormone, Serum (05.22.22 @ 10:43) Thyroid Stimulating Hormone, Serum: 2.72 uU/mL     CT Head No Cont (05.21.22 @ 22:11)   IMPRESSION: No acute intracranial hemorrhage, territorial infarct or mass effect.    Transthoracic Echocardiogram (05.22.22 @ 08:01)   CONCLUSIONS:  1. Normal mitral valve.  2. Trileaflet aortic valve with minimal calcifications.  No aortic stenosis. No aortic valve regurgitation seen.  3. Mildly increased left ventricular wall thickness.  4. Hyperdynamic left ventricular systolic function (EF >70%). No regional wall motion abnormalities.  5. Grade I diastolic dysfunction (Impaired relaxation, mild).  6. Normal right ventricular size and systolic function (TAPSE 2.4 cm).  7. RA Pressure is 8 mm Hg.  8. RV systolic pressure is mildly increased at  44 mm Hg.  9. Normal tricuspid valve. Mild tricuspid regurgitation.  10. Pulmonic valve not well seen. Trace pulmonic insufficiency is noted.    D/D:  Syncope with suspected LOC (hard to determine given Dementia) ?? Arrhythmia vs Neurogenic like seizure activity  suspected UTI although afebrile, no leucocytosis  Severe Hypokalemia etiology ??poor oral intake  Hx sarcoidosis  Hypothyroidism  Mild Hypercalcemia  Moderate Protein Calorie malnutrition    Plan:  Continue Tele for now; Cardio f/u; Echo WNL  Given prolonged LOC reported although hard to confirm with underlying dementia, will get EEG  Neurology consult oted;   Orthostatic vitals negative on admission;   F/U vit D, PTH hx of sarcoidosis, corrected calcium 10.3.  Replace potassium; will give oral 40 meq x 2 doses; got almost 2 riders  Urine Cx was not completed yesterdayStraight Cath or patient void as better nentation; d/w RN; JUST TO RULE OUT CRE GIVEN HX  Previously poorly controlled Hypothyroid state TSH WNL   Continue Levothyroxine 50 mcg daily  Add Ensure Enlive BID  DVT ppx    Discussed with PGY1 DR. Whitmore;   Discussed with MIKE Cox Patient seen and examined  this morning    77 y/o female, from home, baseline dementia, AA0 x2 and ambulatory w/ wheelchair, with PMH of Sarcoidosis, and Hypothyroidism who was BIBEMS after she had lost consciousness in a sitting position and slipped from her wheelchair witnessed by her daughter on floor for 15 mins. Patient has underlying Dementia with no recollection of the event;  no tongue bite, urinary incontinence, tonic clonic movements, or preceding dysarthria or facial droop. Patient only complains of lower abdominal pain, less PO intake for a few days due to nausea, and dysuria w/ burning. She denies any headaches, visual disturbances, V/D, dizziness, cough, chest pain, palpitations, lower extremity swelling, or fevers.   Of note patient was admitted in Jan 2022, for syncope, was thought to be due to metabolic encephalopathy 2/2 COVID +uncontrolled hypothyroidism. Hx CRE     Patient noted to have severe hypokalemia in ED now better;   Patient is doing significantly better today mentation wise; much more alert and awake; fully co-operative; appears to be at baseline now  denies fever, chills, SOB, palpitations, chest pain, nausea, vomiting, diarrhea, constipation,    Vital Signs Last 24 Hrs  T(C): 36.7 (23 May 2022 15:49), Max: 37 (23 May 2022 04:23)  T(F): 98 (23 May 2022 15:49), Max: 98.6 (23 May 2022 04:23)  HR: 62 (23 May 2022 15:49) (57 - 68)  BP: 112/64 (23 May 2022 15:49) (112/64 - 136/66)  BP(mean): 74 (23 May 2022 12:02) (74 - 75)  RR: 18 (23 May 2022 15:49) (17 - 18)  SpO2: 98% (23 May 2022 15:49) (97% - 100%)    P/E: as above  Psych: AAO x2; baseline Dementia??  Neuro: No gross focal deficits; Power and sensation intact LE 4/5 but improved today  CVS: S1S2 present, regular, no edema  Resp: BLAE+, No wheeze or Rhonchi  GI: Soft, BS+, Non tender, non distended  Extr: No  calf tenderness B/L Lower extremities  Skin: Warm and moist without any rashes    Labs:                11.2   9.25  )-----------( 328      ( 23 May 2022 07:13 )             35.5   05-23  145  |  111<H>  |  9   ----------------------------<  86  3.9   |  29  |  0.48<L>  Ca    8.6      23 May 2022 07:13  Phos  2.2     05-22  Mg     2.1     05-22  TPro  7.1  /  Alb  2.2<L>  /  TBili  1.1  /  DBili  x   /  AST  13  /  ALT  9<L>  /  AlkPhos  60  05-21  Vitamin D, 1, 25-Dihydroxy (05.22.22 @ 14:04) Vitamin D, 1, 25-Dihydroxy: 21.1 pg/mL   Thyroid Stimulating Hormone, Serum (05.22.22 @ 10:43) Thyroid Stimulating Hormone, Serum: 2.72 uU/mL     CT Head No Cont (05.21.22 @ 22:11)   IMPRESSION: No acute intracranial hemorrhage, territorial infarct or mass effect.    Transthoracic Echocardiogram (05.22.22 @ 08:01)   CONCLUSIONS:  1. Normal mitral valve.  2. Trileaflet aortic valve with minimal calcifications.  No aortic stenosis. No aortic valve regurgitation seen.  3. Mildly increased left ventricular wall thickness.  4. Hyperdynamic left ventricular systolic function (EF >70%). No regional wall motion abnormalities.  5. Grade I diastolic dysfunction (Impaired relaxation, mild).  6. Normal right ventricular size and systolic function (TAPSE 2.4 cm).  7. RA Pressure is 8 mm Hg.  8. RV systolic pressure is mildly increased at  44 mm Hg.  9. Normal tricuspid valve. Mild tricuspid regurgitation.  10. Pulmonic valve not well seen. Trace pulmonic insufficiency is noted.    D/D:  Syncope with suspected LOC (hard to determine given Dementia) ?? Arrhythmia vs Neurogenic like seizure activity  suspected UTI although afebrile, no leucocytosis oubt clinically  Severe Hypokalemia etiology ??poor oral intake possibly contributing to symptoms on presentation  Hx sarcoidosis  Hypothyroidism  Mild Hypercalcemia resolved likely dehydration (corrected Ca 9.8)  Moderate Protein Calorie malnutrition  Mild Pulmonary HTN    Plan:  Continue Tele for now; Cardio f/u; Echo WNL  Given prolonged LOC reported although hard to confirm with underlying dementia,; F/U EEG/ MRI  Neurology consult noted; Orthostatic vitals negative on admission;   Vit D 1,25 hydroxy low and Corrected Ca 9.8 doubt, active sarcoidosis, Monitor K closely  Urine Cx was not completed yesterday. Straight Cath or patient void as better mentation; d/w RN; JUST TO RULE OUT CRE GIVEN HX  Previously poorly controlled Hypothyroid state TSH WNL   Continue Levothyroxine 50 mcg daily empty stomach  Ensure Enlive BID  DVT ppx    Discussed with PGY1 DR. Whitmore;   Discussed with MIKE Maya  D/C Plan 1-2 days if remain stable  I will be away 5/24/22 to 5/30/22; Hospitalist Colleague/ Dr. Calvert to assume care

## 2022-05-23 NOTE — PHYSICAL THERAPY INITIAL EVALUATION ADULT - PRECAUTIONS/LIMITATIONS, REHAB EVAL
Blanca said that Martina Varghese is still battling his cough, her and her  both were dx with pneumonia over the weekend.   Jeanine Bates was wondering if she should bring Martina Varghese in when she comes in for you to look at him?  please advise fall precautions

## 2022-05-23 NOTE — CONSULT NOTE ADULT - ASSESSMENT
Episode of LOC in patient who had low dysatolic BP and hypokalemia with normal EEG concerning for orthostatic syncope vs. cardiac etiology of syncope. No signs of seziures on the EEG.  Will recommend that the patient get orthostatic VS and cardiac evaluation.      Please call back with questions.    neisha resident and Dr. Rodriguez

## 2022-05-23 NOTE — DISCHARGE NOTE PROVIDER - NSDCCPCAREPLAN_GEN_ALL_CORE_FT
PRINCIPAL DISCHARGE DIAGNOSIS  Diagnosis: Syncope  Assessment and Plan of Treatment: You presented after a syncopal episode. You lost conciouness for more than 15 minutes so there was concern for possible seizures. You had a EEG that was negative for seizures, and you had a CT and MRI of your head that did not show any evidence of stroke. You had a ECHO of your heart that showed grade I diastolic dysfunction. Your loss of conciousness is likley due to your dehydration because you were not eating/drinking as usual. Please follow up with your primary care physician in one week to inform them of your recent hospitalization and further management of your medical conditions.        SECONDARY DISCHARGE DIAGNOSES  Diagnosis: Sarcoidosis  Assessment and Plan of Treatment: You have sarcoidosis and your Vitamin D, PTH, and Calcium levels were within normal limits. Please follow up with your primary care physician in one week to inform them of your recent hospitalization and further management of your medical conditions.      Diagnosis: Hypothyroidism  Assessment and Plan of Treatment: You have hypothyroidism, and you thyroid levels were measured which were within normal range. You Levothyroxine was continued throughout your stay. Please follow up with your primary care physician in one week to inform them of your recent hospitalization and further management of your medical conditions.      Diagnosis: Hypokalemia  Assessment and Plan of Treatment: You were found to have low potassium, because you were not eating well. THis was replaced with medication and repeat levels are within normal limits.

## 2022-05-23 NOTE — EEG REPORT - NS EEG TEXT BOX
[facility]   COMPREHENSIVE EPILEPSY CENTER   REPORT OF ROUTINE VIDEO EEG     [facility]: [address], [place] [address], Ph#: [phone]  [facility]:  [address] [place] [address], Ph#: [phone]  [facility]: [address] [place] [address], Ph#: [phone]    Patient Name: IVÁN ONEILL  Age and : 76y ([birth year])  MRN #: [id number]  Location: [place] [address] [address]  Referring Physician: Jamal Rodriguez    Study Date: 22    _____________________________________________________________  TECHNICAL INFORMATION    Placement and Labeling of Electrodes:  The EEG was performed utilizing 20 channels referential EEG connections (coronal over temporal over parasagittal montage) using all standard 10-20 electrode placements with EKG.  Recording was at a sampling rate of 256 samples per second per channel.  Time synchronized digital video recording was done simultaneously with EEG recording.  A low light infrared camera was used for low light recording.  Geoffrey and seizure detection algorithms were utilized.    _____________________________________________________________  HISTORY    Patient is a 76y old  Female who presents with a chief complaint of Syncope and Fall (23 May 2022 10:40)      PERTINENT MEDICATION:  MEDICATIONS  (STANDING):  cefTRIAXone   IVPB 1000 milliGRAM(s) IV Intermittent every 24 hours  enoxaparin Injectable 40 milliGRAM(s) SubCutaneous every 12 hours  levothyroxine 50 MICROGram(s) Oral daily  sodium chloride 0.9%. 1000 milliLiter(s) (65 mL/Hr) IV Continuous <Continuous>    _____________________________________________________________  STUDY INTERPRETATION    Findings: The background was continuous and reactive. During wakefulness, the posterior dominant rhythm consisted of symmetric, well-modulated 8 Hz activity, with amplitude to 30 uV, that attenuated to eye opening.      Background Slowing:  No generalized background slowing was present.    Focal Slowing:   None were present.    Sleep Background:  Drowsiness was characterized by fragmentation, attenuation, and slowing of the background activity.    Stage II sleep transients were not recorded.    Other Non-Epileptiform Findings:  None were present.    Interictal Epileptiform Activity:   None were present.    Events:  Clinical events: None recorded.  Seizures: None recorded.    Activation Procedures:   Hyperventilation was not performed.    Photic stimulation was not performed.     Artifacts:  Intermittent myogenic and movement artifacts were noted.    ECG:  The heart rate on single channel ECG was predominantly between 58-64 BPM.    _____________________________________________________________  EEG SUMMARY/CLASSIFICATION    Normal EEG in the awake and drowsy states.    _____________________________________________________________  EEG IMPRESSION/CLINICAL CORRELATE    Normal EEG study.  - Mild Bradycardia noted incidentally.  - No epileptiform patterns or seizures were recorded.    *** PRELIMINARY REPORT - PENDING EPILEPSY ATTENDING REVIEW ***  _____________________________________________________________    Bala Richards MD, MARLENE  Fellow | [facility] [place]   [facility]   COMPREHENSIVE EPILEPSY CENTER   REPORT OF ROUTINE VIDEO EEG     [facility]: [address], [place] [address], Ph#: [phone]  [facility]:  [address] [place] [address], Ph#: [phone]  [facility]: [address] [place] [address], Ph#: [phone]    Patient Name: IVÁN ONEILL  Age and : 76y ([birth year])  MRN #: [id number]  Location: [place] [address] [address]  Referring Physician: Jamal Rodriguez    Study Date: 22    _____________________________________________________________  TECHNICAL INFORMATION    Placement and Labeling of Electrodes:  The EEG was performed utilizing 20 channels referential EEG connections (coronal over temporal over parasagittal montage) using all standard 10-20 electrode placements with EKG.  Recording was at a sampling rate of 256 samples per second per channel.  Time synchronized digital video recording was done simultaneously with EEG recording.  A low light infrared camera was used for low light recording.  Geoffrey and seizure detection algorithms were utilized.    _____________________________________________________________  HISTORY    Patient is a 76y old  Female who presents with a chief complaint of Syncope and Fall (23 May 2022 10:40)      PERTINENT MEDICATION:  MEDICATIONS  (STANDING):  cefTRIAXone   IVPB 1000 milliGRAM(s) IV Intermittent every 24 hours  enoxaparin Injectable 40 milliGRAM(s) SubCutaneous every 12 hours  levothyroxine 50 MICROGram(s) Oral daily  sodium chloride 0.9%. 1000 milliLiter(s) (65 mL/Hr) IV Continuous <Continuous>    _____________________________________________________________  STUDY INTERPRETATION    Findings: The background was continuous and reactive. During wakefulness, the posterior dominant rhythm consisted of symmetric, well-modulated 8 Hz activity, with amplitude to 30 uV, that attenuated to eye opening.      Background Slowing:  No generalized background slowing was present.    Focal Slowing:   None were present.    Sleep Background:  Drowsiness was characterized by fragmentation, attenuation, and slowing of the background activity.    Stage II sleep transients were not recorded.    Other Non-Epileptiform Findings:  None were present.    Interictal Epileptiform Activity:   None were present.    Events:  Clinical events: None recorded.  Seizures: None recorded.    Activation Procedures:   Hyperventilation was not performed.    Photic stimulation was not performed.     Artifacts:  Intermittent myogenic and movement artifacts were noted.    ECG:  The heart rate on single channel ECG was predominantly between 58-64 BPM.    _____________________________________________________________  EEG SUMMARY/CLASSIFICATION    Normal EEG in the awake and drowsy states.    _____________________________________________________________  EEG IMPRESSION/CLINICAL CORRELATE    Normal EEG study.  - Mild Bradycardia noted incidentally.  - No epileptiform patterns or seizures were recorded.  _____________________________________________________________    Bala Richards MD, MARLENE  Fellow | [facility]    Henrique Alston MD PhD  Director, Epilepsy Division, [facility] EEG Reading Room Ph#: [phone]  Epilepsy Answering Service after 5PM and before 8:30AM: Ph#: [phone]

## 2022-05-23 NOTE — DISCHARGE NOTE PROVIDER - NSDCMRMEDTOKEN_GEN_ALL_CORE_FT
Aricept 5 mg oral tablet: 1 tab(s) orally once a day (at bedtime)  Synthroid 88 mcg (0.088 mg) oral tablet: 1 tab(s) orally once a day

## 2022-05-23 NOTE — CONSULT NOTE ADULT - SUBJECTIVE AND OBJECTIVE BOX
***TEMPLATE ONLY***      Patient is a 76y old  Female who presents with a chief complaint of Syncope and Fall (23 May 2022 08:29)      HPI:  Patient is a 75 y/o female, from home, baseline  and ambulatory w/ wheelchair, with significant medical history of Sarcoidosis, and Hypothyroidism who was BIBEMS after she had lost consciousness in a sitting position and slipped from her wheelchair. Patient is pleasant on interview and reports that she has  no recollection of the event; she only remembers waking up in the ambulance. Collateral history was collected from daughter Dariana who reports that the patient was sitting in her wheelchair when she had a sudden LOC and slumped to the floor without headstrike or other trauma. Despite trying to awaken her, she remained unconscious for >15 minutes according to her family. no tongue bite, urinary incontinence, tonic clonic movements, or preceding dysarthria or facial droop. Patient only complains of lower abdominal pain, less PO intake for a few days due to nausea, and dysuria w/ burning. She denies any headaches, visual disturbances, V/D, dizziness, cough, chest pain, palpitations, lower extremity swelling, or fevers. No new medications except a dementia medication which the daughter is unable to remember the name of.  (21 May 2022 23:54)         Neurological Review of Systems:  No difficulty with language.  No vision loss or double vision.  No dizziness, vertigo or new hearing loss.  No difficulty with speech or swallowing.  No focal weakness.  No focal sensory changes.  No numbness or tingling in the bilateral lower extremities.  No difficulty with balance.  No difficulty with ambulation.        MEDICATIONS  (STANDING):  cefTRIAXone   IVPB 1000 milliGRAM(s) IV Intermittent every 24 hours  enoxaparin Injectable 40 milliGRAM(s) SubCutaneous every 12 hours  levothyroxine 50 MICROGram(s) Oral daily  potassium chloride  10 mEq/100 mL IVPB 10 milliEquivalent(s) IV Intermittent every 1 hour  sodium chloride 0.9%. 1000 milliLiter(s) (65 mL/Hr) IV Continuous <Continuous>    MEDICATIONS  (PRN):  acetaminophen     Tablet .. 650 milliGRAM(s) Oral every 6 hours PRN Temp greater or equal to 38C (100.4F), Mild Pain (1 - 3)  ondansetron Injectable 4 milliGRAM(s) IV Push every 8 hours PRN Nausea and/or Vomiting    Allergies    No Known Allergies    Intolerances      PAST MEDICAL & SURGICAL HISTORY:  Dementia      Hypothyroidism      Sarcoidosis      No significant past surgical history        FAMILY HISTORY:  No pertinent family history in first degree relatives      SOCIAL HISTORY: non smoker/ former smoker/ active smoker    Review of Systems:  Constitutional: No generalized weakness. No fevers or chills.                    Eyes, Ears, Mouth, Throat: No vision loss   Respiratory: No shortness of breath or cough.                                Cardiovascular: No chest pain or palpitations  Gastrointestinal: No nausea or vomiting.                                         Genitourinary: No urinary incontinence or burning on urination.  Musculoskeletal: No joint pain.                                                           Dermatologic: No rash.  Neurological: as per HPI                                                                      Psychiatric: No behavioral problems.  Endocrine: No known hypoglycemia.               Hematologic/Lymphatic: No easy bleeding.    O:  Vital Signs Last 24 Hrs  T(C): 36.6 (23 May 2022 07:16), Max: 37 (23 May 2022 04:23)  T(F): 97.9 (23 May 2022 07:16), Max: 98.6 (23 May 2022 04:23)  HR: 62 (23 May 2022 07:16) (62 - 80)  BP: 136/66 (23 May 2022 07:16) (109/45 - 136/66)  BP(mean): --  RR: 17 (23 May 2022 07:16) (17 - 18)  SpO2: 97% (23 May 2022 07:16) (95% - 99%)    General Exam:   General appearance: No acute distress                 Cardiovascular: Pedal dorsalis pulses intact bilaterally    Mental Status: Orientated to self, date and place.  Attention intact.  No dysarthria, aphasia or neglect.  Knowledge intact.  Registration intact.  Short and long term memory grossly intact.      Cranial Nerves: CN I - not tested.  PERRL, EOMI, VFF, no nystagmus or diplopia.  No APD.  Fundi not visualized.  CN V1-3 intact to light touch and pinprick.  No facial asymmetry.  Hearing intact to finger rub bilaterally.  Tongue, uvula and palate midline.  Sternocleidomastoid and Trapezius intact bilaterally.    Motor:   Tone: normal.                  Strength intact throughout  No pronator drift bilaterally                      No dysmetria on finger-nose-finger or heel-shin-heel  No truncal ataxia.  No resting, postural or action tremor.  No myoclonus.    Sensation: intact to light touch, pinprick, vibration and proprioception    Deep Tendon Reflexes: 1+ bilateral biceps, triceps, brachioradialis, knee and ankle  Toes flexor bilaterally    Gait: normal and stable.  Rhomberg -shruthi.    Other:     LABS:                        11.2   9.25  )-----------( 328      ( 23 May 2022 07:13 )             35.5     05-23    145  |  111<H>  |  9   ----------------------------<  86  3.9   |  29  |  0.48<L>    Ca    8.6      23 May 2022 07:13  Phos  2.2     05-22  Mg     2.1     05-22    TPro  7.1  /  Alb  2.2<L>  /  TBili  1.1  /  DBili  x   /  AST  13  /  ALT  9<L>  /  AlkPhos  60  05-21            RADIOLOGY & ADDITIONAL STUDIES:    EKG:    < from: CT Head No Cont (05.21.22 @ 22:11) > (images reviwed)  ACC: [id number] EXAM:  CT BRAIN                          PROCEDURE DATE:  05/21/2022          INTERPRETATION:  CLINICAL INFORMATION: Syncope.    TECHNIQUE: Multiple axial sections were acquired from the base of the   skull to the vertex without contrast enhancement. Coronal and sagittal   reformats were additionally performed. Beam hardening artifact slightly   obscures evaluation of the posterior fossa and brainstem.    COMPARISON: CT of the head from 1/10/2022.    FINDINGS:  Mild parenchymal volume loss is noted with prominent ventricles and   sulci. Chronic microvascular ischemic changes are identified. No acute   territorial infarct is demonstrated.    There is no evidence of an acute hemorrhage or mass-effect in the   posterior fossa or in the supratentorial region. Senescent bilateral   basal ganglia calcifications are noted.    Evaluation of the osseous structures with the appropriate window appears   unremarkable. Vascular calcifications are noted. The visualized paranasal   sinuses and mastoid air cells are clear.    IMPRESSION:  No acute intracranial hemorrhage, territorial infarct or mass effect.    --- End of Report ---            EVERETT DIAZ MD; Attending Radiologist  This document has been electronically signed. May21 2022 10:46PM    < end of copied text >     Patient is a 76y old  Female who presents with a chief complaint of Syncope and Fall (23 May 2022 08:29)      HPI:  Patient is a 75 y/o female, from home, baseline  and ambulatory w/ wheelchair, with significant medical history of Sarcoidosis, and Hypothyroidism who was BIBEMS after she had lost consciousness in a sitting position and slipped from her wheelchair. Patient is pleasant on interview and reports that she has  no recollection of the event; she only remembers waking up in the ambulance. Collateral history was collected from daughter Dariana who reports that the patient was sitting in her wheelchair when she had a sudden LOC and slumped to the floor without headstrike or other trauma. Despite trying to awaken her, she remained unconscious for >15 minutes according to her family. no tongue bite, urinary incontinence, tonic clonic movements, or preceding dysarthria or facial droop. Patient only complains of lower abdominal pain, less PO intake for a few days due to nausea, and dysuria w/ burning. She denies any headaches, visual disturbances, V/D, dizziness, cough, chest pain, palpitations, lower extremity swelling, or fevers. No new medications except a dementia medication which the daughter is unable to remember the name of.  (21 May 2022 23:54)         Neurological Review of Systems:  No difficulty with language.  No vision loss or double vision.  No dizziness, vertigo or new hearing loss.  No difficulty with speech or swallowing.  No focal weakness.  No focal sensory changes.  No numbness or tingling in the bilateral lower extremities.  No difficulty with balance.  No difficulty with ambulation.        MEDICATIONS  (STANDING):  cefTRIAXone   IVPB 1000 milliGRAM(s) IV Intermittent every 24 hours  enoxaparin Injectable 40 milliGRAM(s) SubCutaneous every 12 hours  levothyroxine 50 MICROGram(s) Oral daily  potassium chloride  10 mEq/100 mL IVPB 10 milliEquivalent(s) IV Intermittent every 1 hour  sodium chloride 0.9%. 1000 milliLiter(s) (65 mL/Hr) IV Continuous <Continuous>    MEDICATIONS  (PRN):  acetaminophen     Tablet .. 650 milliGRAM(s) Oral every 6 hours PRN Temp greater or equal to 38C (100.4F), Mild Pain (1 - 3)  ondansetron Injectable 4 milliGRAM(s) IV Push every 8 hours PRN Nausea and/or Vomiting    Allergies    No Known Allergies    Intolerances      PAST MEDICAL & SURGICAL HISTORY:  Dementia      Hypothyroidism      Sarcoidosis      No significant past surgical history        FAMILY HISTORY:  No pertinent family history in first degree relatives      SOCIAL HISTORY: non smoker/ former smoker/ active smoker    Review of Systems:  Constitutional: No generalized weakness. No fevers or chills.                    Eyes, Ears, Mouth, Throat: No vision loss   Respiratory: No shortness of breath or cough.                                Cardiovascular: No chest pain or palpitations  Gastrointestinal: No nausea or vomiting.                                         Genitourinary: No urinary incontinence or burning on urination.  Musculoskeletal: No joint pain.                                                           Dermatologic: No rash.  Neurological: as per HPI                                                                      Psychiatric: No behavioral problems.  Endocrine: No known hypoglycemia.               Hematologic/Lymphatic: No easy bleeding.    O:  Vital Signs Last 24 Hrs  T(C): 36.6 (23 May 2022 07:16), Max: 37 (23 May 2022 04:23)  T(F): 97.9 (23 May 2022 07:16), Max: 98.6 (23 May 2022 04:23)  HR: 62 (23 May 2022 07:16) (62 - 80)  BP: 136/66 (23 May 2022 07:16) (109/45 - 136/66)  BP(mean): --  RR: 17 (23 May 2022 07:16) (17 - 18)  SpO2: 97% (23 May 2022 07:16) (95% - 99%)    General Exam:   General appearance: No acute distress                 Cardiovascular: Pedal dorsalis pulses intact bilaterally    Mental Status: Orientated to self, date and place.  Attention intact.  No dysarthria, aphasia or neglect.  Knowledge intact.  Registration intact.  Short and long term memory grossly intact.      Cranial Nerves: CN I - not tested.  PERRL, EOMI, VFF, no nystagmus or diplopia.  No APD.  Fundi not visualized.  CN V1-3 intact to light touch and pinprick.  No facial asymmetry.  Hearing intact to finger rub bilaterally.  Tongue, uvula and palate midline.  Sternocleidomastoid and Trapezius intact bilaterally.    Motor:   Tone: normal.                  Strength intact throughout  No pronator drift bilaterally                      No dysmetria on finger-nose-finger or heel-shin-heel  No truncal ataxia.  No resting, postural or action tremor.  No myoclonus.    Sensation: intact to light touch, pinprick, vibration and proprioception    Deep Tendon Reflexes: 1+ bilateral biceps, triceps, brachioradialis, knee and ankle  Toes flexor bilaterally    Gait: normal and stable.  Rhomberg -shruthi.    Other:     LABS:                        11.2   9.25  )-----------( 328      ( 23 May 2022 07:13 )             35.5     05-23    145  |  111<H>  |  9   ----------------------------<  86  3.9   |  29  |  0.48<L>    Ca    8.6      23 May 2022 07:13  Phos  2.2     05-22  Mg     2.1     05-22    TPro  7.1  /  Alb  2.2<L>  /  TBili  1.1  /  DBili  x   /  AST  13  /  ALT  9<L>  /  AlkPhos  60  05-21            RADIOLOGY & ADDITIONAL STUDIES:    EKG:    < from: CT Head No Cont (05.21.22 @ 22:11) > (images reviwed)  ACC: [id number] EXAM:  CT BRAIN                          PROCEDURE DATE:  05/21/2022          INTERPRETATION:  CLINICAL INFORMATION: Syncope.    TECHNIQUE: Multiple axial sections were acquired from the base of the   skull to the vertex without contrast enhancement. Coronal and sagittal   reformats were additionally performed. Beam hardening artifact slightly   obscures evaluation of the posterior fossa and brainstem.    COMPARISON: CT of the head from 1/10/2022.    FINDINGS:  Mild parenchymal volume loss is noted with prominent ventricles and   sulci. Chronic microvascular ischemic changes are identified. No acute   territorial infarct is demonstrated.    There is no evidence of an acute hemorrhage or mass-effect in the   posterior fossa or in the supratentorial region. Senescent bilateral   basal ganglia calcifications are noted.    Evaluation of the osseous structures with the appropriate window appears   unremarkable. Vascular calcifications are noted. The visualized paranasal   sinuses and mastoid air cells are clear.    IMPRESSION:  No acute intracranial hemorrhage, territorial infarct or mass effect.    --- End of Report ---            EVERETT DIAZ MD; Attending Radiologist  This document has been electronically signed. May21 2022 10:46PM    < end of copied text >     Patient is a 76y old  Female who presents with a chief complaint of Syncope and Fall (23 May 2022 08:29)      HPI:  Patient is a 75 y/o female, from home, baseline  and ambulatory w/ wheelchair, with significant medical history of Sarcoidosis, and Hypothyroidism who was BIBEMS after she had lost consciousness in a sitting position and slipped from her wheelchair. Patient is pleasant on interview and reports that she has  no recollection of the event; she only remembers waking up in the ambulance. Collateral history was collected from daughter Dariana who reports that the patient was sitting in her wheelchair when she had a sudden LOC and slumped to the floor without headstrike or other trauma. Despite trying to awaken her, she remained unconscious for >15 minutes according to her family. no tongue bite, urinary incontinence, tonic clonic movements, or preceding dysarthria or facial droop. Patient only complains of lower abdominal pain, less PO intake for a few days due to nausea, and dysuria w/ burning. She denies any headaches, visual disturbances, V/D, dizziness, cough, chest pain, palpitations, lower extremity swelling, or fevers. No new medications except a dementia medication which the daughter is unable to remember the name of.  (21 May 2022 23:54)     Patient says that she had similar episodes in the past.    Neurological Review of Systems:  No difficulty with language.  No vision loss or double vision.  No dizziness, vertigo or new hearing loss.  No difficulty with speech or swallowing.  No focal weakness.  No focal sensory changes.  No difficulty with balance.  No difficulty with ambulation.        MEDICATIONS  (STANDING):  cefTRIAXone   IVPB 1000 milliGRAM(s) IV Intermittent every 24 hours  enoxaparin Injectable 40 milliGRAM(s) SubCutaneous every 12 hours  levothyroxine 50 MICROGram(s) Oral daily  potassium chloride  10 mEq/100 mL IVPB 10 milliEquivalent(s) IV Intermittent every 1 hour  sodium chloride 0.9%. 1000 milliLiter(s) (65 mL/Hr) IV Continuous <Continuous>    MEDICATIONS  (PRN):  acetaminophen     Tablet .. 650 milliGRAM(s) Oral every 6 hours PRN Temp greater or equal to 38C (100.4F), Mild Pain (1 - 3)  ondansetron Injectable 4 milliGRAM(s) IV Push every 8 hours PRN Nausea and/or Vomiting    Allergies    No Known Allergies    Intolerances      PAST MEDICAL & SURGICAL HISTORY:  Dementia      Hypothyroidism      Sarcoidosis      No significant past surgical history        FAMILY HISTORY:  No pertinent family history in first degree relatives      SOCIAL HISTORY: non smoker    Review of Systems:  Constitutional:  No fevers.                    Eyes, Ears, Mouth, Throat: No vision loss   Respiratory: No cough.                                Cardiovascular: No chest pain   Gastrointestinal: No vomiting.                                         Genitourinary: No burning on urination.  Musculoskeletal: No joint pain.                                                           Dermatologic: No rash.  Neurological: as per HPI                                                                      Psychiatric: No behavioral problems.  Endocrine: No known hypoglycemia.               Hematologic/Lymphatic: No easy bleeding.    O:  Vital Signs Last 24 Hrs  T(C): 36.6 (23 May 2022 07:16), Max: 37 (23 May 2022 04:23)  T(F): 97.9 (23 May 2022 07:16), Max: 98.6 (23 May 2022 04:23)  HR: 62 (23 May 2022 07:16) (62 - 80)  BP: 136/66 (23 May 2022 07:16) (109/45 - 136/66)  BP(mean): --  RR: 17 (23 May 2022 07:16) (17 - 18)  SpO2: 97% (23 May 2022 07:16) (95% - 99%)    General Exam:   General appearance: No acute distress                 Cardiovascular: Pedal dorsalis pulses intact bilaterally    Mental Status: Orientated to self, date and place.  Attention intact.  No dysarthria, aphasia or neglect.  Knowledge intact.  Registration intact.  Short and long term memory grossly intact.      Cranial Nerves: CN I - not tested.  PERRL, EOMI, VFF, no nystagmus or diplopia.  No APD.  Fundi not visualized.  CN V1-3 intact to light touch.  No facial asymmetry.  Hearing intact to finger rub bilaterally.  Tongue, uvula and palate midline.  Sternocleidomastoid and Trapezius intact bilaterally.    Motor:   Tone: normal.                  Strength intact throughout  No pronator drift bilaterally                      No dysmetria on finger-nose-finger or heel-shin-heel  No truncal ataxia.  No resting, postural or action tremor.  No myoclonus.    Sensation: intact to light touch    Deep Tendon Reflexes: 1+ bilateral biceps, triceps, brachioradialis, knee and ankle  Toes flexor bilaterally    Gait: pt declines    Other:     LABS:                        11.2   9.25  )-----------( 328      ( 23 May 2022 07:13 )             35.5     05-23    145  |  111<H>  |  9   ----------------------------<  86  3.9   |  29  |  0.48<L>    Ca    8.6      23 May 2022 07:13  Phos  2.2     05-22  Mg     2.1     05-22    TPro  7.1  /  Alb  2.2<L>  /  TBili  1.1  /  DBili  x   /  AST  13  /  ALT  9<L>  /  AlkPhos  60  05-21            RADIOLOGY & ADDITIONAL STUDIES:    EKG: EEG SUMMARY/CLASSIFICATION    Normal EEG in the awake and drowsy states.    _____________________________________________________________  EEG IMPRESSION/CLINICAL CORRELATE    Normal EEG study.  - Mild Bradycardia noted incidentally.  - No epileptiform patterns or seizures were recorded.    *** PRELIMINARY REPORT - PENDING EPILEPSY ATTENDING REVIEW ***  _____________________________________________________________      < from: CT Head No Cont (05.21.22 @ 22:11) > (images reviwed)  ACC: [id number] EXAM:  CT BRAIN                          PROCEDURE DATE:  05/21/2022          INTERPRETATION:  CLINICAL INFORMATION: Syncope.    TECHNIQUE: Multiple axial sections were acquired from the base of the   skull to the vertex without contrast enhancement. Coronal and sagittal   reformats were additionally performed. Beam hardening artifact slightly   obscures evaluation of the posterior fossa and brainstem.    COMPARISON: CT of the head from 1/10/2022.    FINDINGS:  Mild parenchymal volume loss is noted with prominent ventricles and   sulci. Chronic microvascular ischemic changes are identified. No acute   territorial infarct is demonstrated.    There is no evidence of an acute hemorrhage or mass-effect in the   posterior fossa or in the supratentorial region. Senescent bilateral   basal ganglia calcifications are noted.    Evaluation of the osseous structures with the appropriate window appears   unremarkable. Vascular calcifications are noted. The visualized paranasal   sinuses and mastoid air cells are clear.    IMPRESSION:  No acute intracranial hemorrhage, territorial infarct or mass effect.    --- End of Report ---            EVERETT DIAZ MD; Attending Radiologist  This document has been electronically signed. May21 2022 10:46PM    < end of copied text >

## 2022-05-23 NOTE — DISCHARGE NOTE PROVIDER - YES NO FOR MLM POSITIVE OR NEGATIVE COVID RESULT
Personalized Preventive Plan for Carolina Santillan - 5/11/2022  Medicare offers a range of preventive health benefits. Some of the tests and screenings are paid in full while other may be subject to a deductible, co-insurance, and/or copay. Some of these benefits include a comprehensive review of your medical history including lifestyle, illnesses that may run in your family, and various assessments and screenings as appropriate. After reviewing your medical record and screening and assessments performed today your provider may have ordered immunizations, labs, imaging, and/or referrals for you. A list of these orders (if applicable) as well as your Preventive Care list are included within your After Visit Summary for your review. Other Preventive Recommendations:    · A preventive eye exam performed by an eye specialist is recommended every 1-2 years to screen for glaucoma; cataracts, macular degeneration, and other eye disorders. · A preventive dental visit is recommended every 6 months. · Try to get at least 150 minutes of exercise per week or 10,000 steps per day on a pedometer . · Order or download the FREE \"Exercise & Physical Activity: Your Everyday Guide\" from [employer]. Call 1-634.455.5830 or search [employer] online. · You need 8694-4558 mg of calcium and 3900-4237 IU of vitamin D per day. It is possible to meet your calcium requirement with diet alone, but a vitamin D supplement is usually necessary to meet this goal.  · When exposed to the sun, use a sunscreen that protects against both UVA and UVB radiation with an SPF of 30 or greater. Reapply every 2 to 3 hours or after sweating, drying off with a towel, or swimming. · Always wear a seat belt when traveling in a car. Always wear a helmet when riding a bicycle or motorcycle. ,

## 2022-05-23 NOTE — PHYSICAL THERAPY INITIAL EVALUATION ADULT - ACTIVE RANGE OF MOTION EXAMINATION, REHAB EVAL
left knee flexion 0 to 50 degrees; right knee flexion 0-60 degrees/no Active ROM deficits were identified/deficits as listed below

## 2022-05-23 NOTE — PROGRESS NOTE ADULT - PROBLEM SELECTOR PLAN 5
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

## 2022-05-24 VITALS
DIASTOLIC BLOOD PRESSURE: 74 MMHG | OXYGEN SATURATION: 98 % | TEMPERATURE: 98 F | RESPIRATION RATE: 18 BRPM | HEART RATE: 69 BPM | SYSTOLIC BLOOD PRESSURE: 133 MMHG

## 2022-05-24 DIAGNOSIS — M19.90 UNSPECIFIED OSTEOARTHRITIS, UNSPECIFIED SITE: ICD-10-CM

## 2022-05-24 DIAGNOSIS — E55.9 VITAMIN D DEFICIENCY, UNSPECIFIED: ICD-10-CM

## 2022-05-24 LAB
ANION GAP SERPL CALC-SCNC: 7 MMOL/L — SIGNIFICANT CHANGE UP (ref 5–17)
BUN SERPL-MCNC: 5 MG/DL — LOW (ref 7–18)
CALCIUM SERPL-MCNC: 9.2 MG/DL — SIGNIFICANT CHANGE UP (ref 8.4–10.5)
CHLORIDE SERPL-SCNC: 108 MMOL/L — SIGNIFICANT CHANGE UP (ref 96–108)
CO2 SERPL-SCNC: 29 MMOL/L — SIGNIFICANT CHANGE UP (ref 22–31)
CREAT SERPL-MCNC: 0.53 MG/DL — SIGNIFICANT CHANGE UP (ref 0.5–1.3)
EGFR: 96 ML/MIN/1.73M2 — SIGNIFICANT CHANGE UP
GLUCOSE SERPL-MCNC: 88 MG/DL — SIGNIFICANT CHANGE UP (ref 70–99)
HCT VFR BLD CALC: 36.6 % — SIGNIFICANT CHANGE UP (ref 34.5–45)
HGB BLD-MCNC: 11.7 G/DL — SIGNIFICANT CHANGE UP (ref 11.5–15.5)
MCHC RBC-ENTMCNC: 28.7 PG — SIGNIFICANT CHANGE UP (ref 27–34)
MCHC RBC-ENTMCNC: 32 GM/DL — SIGNIFICANT CHANGE UP (ref 32–36)
MCV RBC AUTO: 89.7 FL — SIGNIFICANT CHANGE UP (ref 80–100)
NRBC # BLD: 0 /100 WBCS — SIGNIFICANT CHANGE UP (ref 0–0)
PLATELET # BLD AUTO: 368 K/UL — SIGNIFICANT CHANGE UP (ref 150–400)
POTASSIUM SERPL-MCNC: 3.7 MMOL/L — SIGNIFICANT CHANGE UP (ref 3.5–5.3)
POTASSIUM SERPL-SCNC: 3.7 MMOL/L — SIGNIFICANT CHANGE UP (ref 3.5–5.3)
RBC # BLD: 4.08 M/UL — SIGNIFICANT CHANGE UP (ref 3.8–5.2)
RBC # FLD: 14.8 % — HIGH (ref 10.3–14.5)
SODIUM SERPL-SCNC: 144 MMOL/L — SIGNIFICANT CHANGE UP (ref 135–145)
WBC # BLD: 7.93 K/UL — SIGNIFICANT CHANGE UP (ref 3.8–10.5)
WBC # FLD AUTO: 7.93 K/UL — SIGNIFICANT CHANGE UP (ref 3.8–10.5)

## 2022-05-24 PROCEDURE — 87635 SARS-COV-2 COVID-19 AMP PRB: CPT

## 2022-05-24 PROCEDURE — 95957 EEG DIGITAL ANALYSIS: CPT

## 2022-05-24 PROCEDURE — 82962 GLUCOSE BLOOD TEST: CPT

## 2022-05-24 PROCEDURE — G1004: CPT

## 2022-05-24 PROCEDURE — 93005 ELECTROCARDIOGRAM TRACING: CPT

## 2022-05-24 PROCEDURE — 96374 THER/PROPH/DIAG INJ IV PUSH: CPT

## 2022-05-24 PROCEDURE — 85025 COMPLETE CBC W/AUTO DIFF WBC: CPT

## 2022-05-24 PROCEDURE — 36415 COLL VENOUS BLD VENIPUNCTURE: CPT

## 2022-05-24 PROCEDURE — 95819 EEG AWAKE AND ASLEEP: CPT

## 2022-05-24 PROCEDURE — 99285 EMERGENCY DEPT VISIT HI MDM: CPT | Mod: 25

## 2022-05-24 PROCEDURE — 82310 ASSAY OF CALCIUM: CPT

## 2022-05-24 PROCEDURE — 93306 TTE W/DOPPLER COMPLETE: CPT

## 2022-05-24 PROCEDURE — 85027 COMPLETE CBC AUTOMATED: CPT

## 2022-05-24 PROCEDURE — 82164 ANGIOTENSIN I ENZYME TEST: CPT

## 2022-05-24 PROCEDURE — 99239 HOSP IP/OBS DSCHRG MGMT >30: CPT

## 2022-05-24 PROCEDURE — 83970 ASSAY OF PARATHORMONE: CPT

## 2022-05-24 PROCEDURE — 70450 CT HEAD/BRAIN W/O DYE: CPT | Mod: MG

## 2022-05-24 PROCEDURE — 70551 MRI BRAIN STEM W/O DYE: CPT | Mod: 26

## 2022-05-24 PROCEDURE — 70551 MRI BRAIN STEM W/O DYE: CPT

## 2022-05-24 PROCEDURE — 84443 ASSAY THYROID STIM HORMONE: CPT

## 2022-05-24 PROCEDURE — 80053 COMPREHEN METABOLIC PANEL: CPT

## 2022-05-24 PROCEDURE — 80048 BASIC METABOLIC PNL TOTAL CA: CPT

## 2022-05-24 PROCEDURE — 83735 ASSAY OF MAGNESIUM: CPT

## 2022-05-24 PROCEDURE — 82652 VIT D 1 25-DIHYDROXY: CPT

## 2022-05-24 PROCEDURE — 84100 ASSAY OF PHOSPHORUS: CPT

## 2022-05-24 PROCEDURE — 84484 ASSAY OF TROPONIN QUANT: CPT

## 2022-05-24 RX ORDER — SODIUM CHLORIDE 9 MG/ML
500 INJECTION INTRAMUSCULAR; INTRAVENOUS; SUBCUTANEOUS ONCE
Refills: 0 | Status: DISCONTINUED | OUTPATIENT
Start: 2022-05-24 | End: 2022-05-24

## 2022-05-24 RX ADMIN — CEFTRIAXONE 100 MILLIGRAM(S): 500 INJECTION, POWDER, FOR SOLUTION INTRAMUSCULAR; INTRAVENOUS at 12:21

## 2022-05-24 RX ADMIN — Medication 50 MICROGRAM(S): at 06:26

## 2022-05-24 RX ADMIN — ENOXAPARIN SODIUM 40 MILLIGRAM(S): 100 INJECTION SUBCUTANEOUS at 18:01

## 2022-05-24 RX ADMIN — ENOXAPARIN SODIUM 40 MILLIGRAM(S): 100 INJECTION SUBCUTANEOUS at 06:26

## 2022-05-24 NOTE — DISCHARGE NOTE NURSING/CASE MANAGEMENT/SOCIAL WORK - PATIENT PORTAL LINK FT
You can access the FollowMyHealth Patient Portal offered by [facility] by registering at the following website: [email or web address]. By joining [facility]’s FollowMyHealth portal, you will also be able to view your health information using other applications (apps) compatible with our system.

## 2022-05-24 NOTE — DIETITIAN INITIAL EVALUATION ADULT - PERTINENT LABORATORY DATA
05-24    144  |  108  |  5<L>  ----------------------------<  88  3.7   |  29  |  0.53    Ca    9.2      24 May 2022 07:05  Phos  2.2     05-22  Mg     2.1     05-22    A1C with Estimated Average Glucose Result: 6.0 % (01-11-22 @ 09:25)

## 2022-05-24 NOTE — DIETITIAN INITIAL EVALUATION ADULT - FACTORS AFF FOOD INTAKE
acute on chronic comorbidities including sarcoidosis, dementia/change in mental status/Restorationist/ethnic/cultural/personal food preferences

## 2022-05-24 NOTE — DISCHARGE NOTE NURSING/CASE MANAGEMENT/SOCIAL WORK - NSDCPEFALRISK_GEN_ALL_CORE
For information on Fall & Injury Prevention, visit: [email or web address] OR  [email or web address] OR  https://www.cdc.gov/steadi/patient.html

## 2022-05-24 NOTE — PROGRESS NOTE ADULT - PROBLEM SELECTOR PLAN 2
Pt c/o b/l knee pain  small effusions bilateral  conservative management  PT follow up   tylenol for pain

## 2022-05-24 NOTE — PROGRESS NOTE ADULT - ATTENDING COMMENTS
Subjective:      Bossman Kumari is a 65 y.o. female who was self-referred for a sore throat.    She notes about 1.5 weeks ago she developed throat pain she notes that it is there pretty much always, but has been improving.  She reports throat discomfort, itching/tickle, and sometimes intermittent hoarseness that is worse after singing.  She denies any other throat symptoms including globus or dysphagia.  She denies any ear pain.  She also reports eye and ear itching for the past few weeks.  She notes her symptoms have been improving since she started drinking warm tea.  She denies any purulent drainage, facial pain, hyposmia or nasal congestion.  She denies any reflux symptoms.  Denies a history of smoking.    She does not recall previously having allergy testing.    She denies a history of asthma.    She denies a history of reflux symptoms.  She has not previously had an EGD.    She denies have a diagnosis of obstructive sleep apnea.     She has not had sinonasal surgery.    She does not recall a prior history of nasal trauma.    Past Medical History  She has a past medical history of Acute cholecystitis, Cholelithiasis, DDD (degenerative disc disease), lumbar, Family history of diabetes mellitus type II, GERD (gastroesophageal reflux disease), Nuclear sclerosis of both eyes, Unspecified asthma, with exacerbation, Urticaria, and Vaginal delivery.    Past Surgical History  She has a past surgical history that includes Cholecystectomy and Total abdominal hysterectomy (1981).    Family History  Her family history includes Cataracts in her father and mother; Diabetes in her mother; Hypertension in her father and mother; No Known Problems in her brother, maternal aunt, maternal grandfather, maternal grandmother, maternal uncle, paternal aunt, paternal grandfather, paternal grandmother, paternal uncle, and sister.    Social History  She reports that she has never smoked. She has never used smokeless tobacco. She  "reports that she does not drink alcohol and does not use drugs.    Allergies  She has No Known Allergies.    Medications  She has a current medication list which includes the following prescription(s): albuterol, ammonium lactate, clobetasol, fluticasone propionate, gabapentin, hydroxyzine pamoate, loratadine, meloxicam, triamcinolone acetonide 0.1%, triamcinolone acetonide 0.1%, and cetirizine.    Review of Systems   Constitutional: Negative.    HENT: Positive for sore throat and voice change.    Eyes: Negative.    Respiratory: Negative.    Cardiovascular: Negative.    Gastrointestinal: Negative.    Endocrine: Negative.    Genitourinary: Negative.    Neurological: Negative.         Objective:     /64 (BP Location: Left arm, Patient Position: Sitting, BP Method: Medium (Manual))   Ht 5' 4" (1.626 m)   Wt 79.1 kg (174 lb 6.1 oz)   BMI 29.93 kg/m²      Constitutional:   She is oriented to person, place, and time. She appears well-developed and well-nourished. She appears alert. She is cooperative.  Non-toxic appearance. She does not have a sickly appearance. She does not appear ill. No distress. Normal speech.      Head:  Normocephalic and atraumatic. Not macrocephalic and not microcephalic. Head is without abrasion, without right periorbital erythema, without left periorbital erythema and without TMJ tenderness. Facial strength is normal.      Ears:    Right Ear: No lacerations. No drainage, swelling or tenderness. No foreign bodies. No mastoid tenderness. Tympanic membrane is not injected, not scarred, not perforated, not erythematous, not retracted and not bulging. No middle ear effusion. No hemotympanum.   Left Ear: No lacerations. No drainage, swelling or tenderness. No foreign bodies. No mastoid tenderness. Tympanic membrane is not injected, not scarred, not perforated, not erythematous, not retracted and not bulging.  No middle ear effusion. No hemotympanum.     Nose:  No mucosal edema, rhinorrhea or " sinus tenderness. No epistaxis. Right sinus exhibits no maxillary sinus tenderness and no frontal sinus tenderness. Left sinus exhibits no maxillary sinus tenderness and no frontal sinus tenderness.     Mouth/Throat  Oropharynx not clear and moist and abnormal uvula midline. Normal dentition. No uvula swelling, oral lesions, trismus, mucous membrane lesions or xerostomia. No oropharyngeal exudate, posterior oropharyngeal edema or posterior oropharyngeal erythema.     Neck:  Trachea normal, phonation normal and no adenopathy.        Head (right side): No submental, no preauricular and no posterior auricular adenopathy present.        Head (left side): No submental, no preauricular and no posterior auricular adenopathy present.     She has no cervical adenopathy.     Pulmonary/Chest:   Effort normal.     Psychiatric:   She has a normal mood and affect. Her speech is normal and behavior is normal.     Neurological:   She is alert and oriented to person, place, and time. She has neurological normal, alert and oriented.     Procedure    None    Data Reviewed  WBC (K/uL)   Date Value   03/02/2021 5.09     Eosinophil % (%)   Date Value   03/02/2021 7.1     Eos # (K/uL)   Date Value   03/02/2021 0.4     Platelets (K/uL)   Date Value   03/02/2021 247     Glucose (mg/dL)   Date Value   03/02/2021 87     Imaging    IMAGING: No pertinent imaging available.    Assessment:     1. Sore throat    2. Irritated throat      Plan:        I had a long discussion with the patient regarding her condition and the further workup and management options.      Sore throat/ Irritated throat        -     Emphasized increased hydration.  Increase water intake and reduce caffeinated and carbonated beverages.  Suck on pastille lozenges or sugar free candies (not mentholated lozenges).  Can also consider humidifier next to the bed.        -     Warm salt water gargles PRN        -     Voice rest including, no loud talking, yelling, cheering, or  screaming.   -     cetirizine (ZYRTEC) 10 MG tablet; Take 1 tablet (10 mg total) by mouth once daily.  -     Advised patient to follow-up in 4 weeks if symptoms are not improved with the recommended treatment.  Will need to perform flexible laryngoscopy.     Follow up in about 4 weeks (around 2/15/2022) for follow-up and laryngoscopy if symptoms worsen or fail to improve.        130 75 y/o female, from home, baseline dementia, AA0 x2 and ambulatory w/ wheelchair, with PMH of Sarcoidosis, and Hypothyroidism who was BIBEMS after she had lost consciousness in a sitting position and slipped from her wheelchair witnessed by her daughter on floor for 15 mins. Patient has underlying Dementia with no recollection of the event;  no tongue bite, urinary incontinence, tonic clonic movements, or preceding dysarthria or facial droop. Patient only complains of lower abdominal pain, less PO intake for a few days due to nausea, and dysuria w/ burning. She denies any headaches, visual disturbances, V/D, dizziness, cough, chest pain, palpitations, lower extremity swelling, or fevers.   Of note patient was admitted in Jan 2022, for syncope, was thought to be due to metabolic encephalopathy 2/2 COVID +uncontrolled hypothyroidism. Hx CRE     Patient noted to have severe hypokalemia in ED now better;   Patient is doing significantly better in the last two days,  much more alert and awake; fully co-operative; appears to be at baseline now  denies fever, chills, SOB, palpitations, chest pain, nausea, vomiting, diarrhea, constipation,  Vital Signs Last 24 Hrs  T(C): 36.8 (24 May 2022 15:51), Max: 36.8 (24 May 2022 05:02)  T(F): 98.2 (24 May 2022 15:51), Max: 98.3 (24 May 2022 05:02)  HR: 75 (24 May 2022 15:51) (65 - 103)  BP: 149/64 (24 May 2022 15:51) (108/87 - 149/64)  BP(mean): --  RR: 18 (24 May 2022 15:51) (18 - 19)  SpO2: 98% (24 May 2022 15:51) (98% - 100%)    Psych: AAO x2; baseline Dementia??  Neuro: No gross focal deficits; Power and sensation intact LE 4/5 but improved today  CVS: S1S2 present, regular, no edema  Resp: BLAE+, No wheeze or Rhonchi  GI: Soft, BS+, Non tender, non distended  Extr: No  calf tenderness B/L Lower extremities  Skin: Warm and moist without any rashes                          11.7   7.93  )-----------( 368      ( 24 May 2022 07:05 )             36.6   05-24    144  |  108  |  5<L>  ----------------------------<  88  3.7   |  29  |  0.53    Ca    9.2      24 May 2022 07:05      Vitamin D, 1, 25-Dihydroxy (05.22.22 @ 14:04) Vitamin D, 1, 25-Dihydroxy: 21.1 pg/mL   Thyroid Stimulating Hormone, Serum (05.22.22 @ 10:43) Thyroid Stimulating Hormone, Serum: 2.72 uU/mL     CT Head No Cont (05.21.22 @ 22:11)   IMPRESSION: No acute intracranial hemorrhage, territorial infarct or mass effect.    Transthoracic Echocardiogram (05.22.22 @ 08:01)   CONCLUSIONS:  1. Normal mitral valve.  2. Trileaflet aortic valve with minimal calcifications.  No aortic stenosis. No aortic valve regurgitation seen.  3. Mildly increased left ventricular wall thickness.  4. Hyperdynamic left ventricular systolic function (EF >70%). No regional wall motion abnormalities.  5. Grade I diastolic dysfunction (Impaired relaxation, mild).  6. Normal right ventricular size and systolic function (TAPSE 2.4 cm).  7. RA Pressure is 8 mm Hg.  8. RV systolic pressure is mildly increased at  44 mm Hg.  9. Normal tricuspid valve. Mild tricuspid regurgitation.  10. Pulmonic valve not well seen. Trace pulmonic insufficiency is noted.    < from: MR Head No Cont (05.24.22 @ 11:41) >    There is no abnormal restricted diffusion to suggest acute infarction.   Scattered periventricular and subcortical white matter T2 /FLAIR   hyperintensities are seen without mass effect, nonspecific, likely   representing moderate to severe chronic microvascular changes. Normal T2   flow-voids are seen within  the intracranial vasculature. The lateral   ventricles and cortical sulci are age-appropriate in size and   configuration. There is no mass, mass effect, or extra-axial fluid   collection. There is no susceptibility artifact to suggest hemorrhage.   Midline structures are normal.  The visualized paranasal sinuses, mastoid   air cells and orbits are unremarkable.    < end of copied text >  IMP:   Syncope with suspected LOC (hard to determine given Dementia) ?? Arrhythmia vs Neurogenic like seizure activity  suspected UTI although afebrile, no leucocytosis oubt clinically  Severe Hypokalemia etiology ??poor oral intake possibly contributing to symptoms on presentation  Hx sarcoidosis  Previously poorly controlled Hypothyroid state TSH WNL   Mild Hypercalcemia resolved likely dehydration (corrected Ca 9.8)  Moderate Protein Calorie malnutrition  Mild Pulmonary HTN    Plan:  Discharge home  Continue Levothyroxine 50 mcg daily empty stomach  Ensure Enlive BID

## 2022-05-24 NOTE — DIETITIAN INITIAL EVALUATION ADULT - ETIOLOGY
acute on chronic comorbidities; cognitive limitation; cultural/ethnical/individual food preferences

## 2022-05-24 NOTE — ADVANCED PRACTICE NURSE CONSULT - ASSESSMENT
This is a 76yr old female patient admitted for Syncope and Collapse, presenting with a healed wound to the L. Gluteus

## 2022-05-24 NOTE — PROGRESS NOTE ADULT - SUBJECTIVE AND OBJECTIVE BOX
PGY-1 Progress Note discussed with attending    PAGER #: [[phone]] TILL 5:00 PM  PLEASE CONTACT ON CALL TEAM:  - On Call Team (Please refer to Patricio) FROM 5:00 PM - 8:30PM  - Nightfloat Team FROM 8:30 -7:30 AM      INTERVAL HPI/OVERNIGHT EVENTS: No acute overnight events, patient was seen and examined at bedside. complaining of bilateral knee pain and back pain       REVIEW OF SYSTEMS:  CONSTITUTIONAL: No fever, weight loss, or fatigue  RESPIRATORY: No cough, wheezing, chills or hemoptysis; No shortness of breath  CARDIOVASCULAR: No chest pain, palpitations, dizziness, or leg swelling  GASTROINTESTINAL: No abdominal pain. No nausea, vomiting, or hematemesis; No diarrhea or constipation. No melena or hematochezia.  GENITOURINARY: No dysuria or hematuria, urinary frequency  NEUROLOGICAL: No headaches, memory loss, loss of strength, numbness, or tremors  SKIN: No itching, burning, rashes, or lesions     Vital Signs Last 24 Hrs  T(C): 36.3 (24 May 2022 11:06), Max: 36.8 (24 May 2022 05:02)  T(F): 97.4 (24 May 2022 11:06), Max: 98.3 (24 May 2022 05:02)  HR: 65 (24 May 2022 11:06) (62 - 103)  BP: 134/56 (24 May 2022 11:06) (108/87 - 148/83)  BP(mean): --  RR: 18 (24 May 2022 11:06) (18 - 19)  SpO2: 100% (24 May 2022 11:06) (98% - 100%)    PHYSICAL EXAMINATION:  GENERAL: NAD,   HEAD:  Atraumatic, Normocephalic  EYES:  conjunctiva and sclera clear  NECK: Supple, No JVD, Normal thyroid  CHEST/LUNG: Clear to auscultation. Clear to percussion bilaterally; No rales, rhonchi, wheezing, or rubs  HEART: Regular rate and rhythm; No murmurs, rubs, or gallops  ABDOMEN: Soft, Nontender, Nondistended; Bowel sounds present  NERVOUS SYSTEM:  Alert & Oriented X2  EXTREMITIES:  2+ Peripheral Pulses, No clubbing, cyanosis, or edema  SKIN: warm dry                          11.7   7.93  )-----------( 368      ( 24 May 2022 07:05 )             36.6     05-24    144  |  108  |  5<L>  ----------------------------<  88  3.7   |  29  |  0.53    Ca    9.2      24 May 2022 07:05                CAPILLARY BLOOD GLUCOSE      RADIOLOGY & ADDITIONAL TESTS:                   PGY-1 Progress Note discussed with attending    PAGER #: [[phone]] TILL 5:00 PM  PLEASE CONTACT ON CALL TEAM:  - On Call Team (Please refer to Patricio) FROM 5:00 PM - 8:30PM  - Nightfloat Team FROM 8:30 -7:30 AM      INTERVAL HPI/OVERNIGHT EVENTS: No acute overnight events, patient was seen and examined at bedside. complaining of bilateral knee pain and back pain       REVIEW OF SYSTEMS:  CONSTITUTIONAL: No fever, weight loss, or fatigue  RESPIRATORY: No cough, wheezing, chills or hemoptysis; No shortness of breath  CARDIOVASCULAR: No chest pain, palpitations, dizziness, or leg swelling  GASTROINTESTINAL: No abdominal pain. No nausea, vomiting, or hematemesis; No diarrhea or constipation. No melena or hematochezia.  GENITOURINARY: No dysuria or hematuria, urinary frequency  NEUROLOGICAL: No headaches, memory loss, loss of strength, numbness, or tremors  SKIN: No itching, burning, rashes, or lesions     Vital Signs Last 24 Hrs  T(C): 36.3 (24 May 2022 11:06), Max: 36.8 (24 May 2022 05:02)  T(F): 97.4 (24 May 2022 11:06), Max: 98.3 (24 May 2022 05:02)  HR: 65 (24 May 2022 11:06) (62 - 103)  BP: 134/56 (24 May 2022 11:06) (108/87 - 148/83)  BP(mean): --  RR: 18 (24 May 2022 11:06) (18 - 19)  SpO2: 100% (24 May 2022 11:06) (98% - 100%)    PHYSICAL EXAMINATION:  GENERAL: NAD,   HEAD:  Atraumatic, Normocephalic  EYES:  conjunctiva and sclera clear  NECK: Supple, No JVD, Normal thyroid  CHEST/LUNG: Clear to auscultation. Clear to percussion bilaterally; No rales, rhonchi, wheezing, or rubs  HEART: Regular rate and rhythm; No murmurs, rubs, or gallops  ABDOMEN: Soft, Nontender, Nondistended; Bowel sounds present  NERVOUS SYSTEM:  Alert & Oriented X2  EXTREMITIES:  2+ Peripheral Pulses, No clubbing, cyanosis, small effusion b/l knee  SKIN: warm dry                          11.7   7.93  )-----------( 368      ( 24 May 2022 07:05 )             36.6     05-24    144  |  108  |  5<L>  ----------------------------<  88  3.7   |  29  |  0.53    Ca    9.2      24 May 2022 07:05                CAPILLARY BLOOD GLUCOSE      RADIOLOGY & ADDITIONAL TESTS:

## 2022-05-24 NOTE — PROGRESS NOTE ADULT - REASON FOR ADMISSION
Syncope and Fall

## 2022-05-24 NOTE — PROGRESS NOTE ADULT - PROBLEM SELECTOR PROBLEM 6
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
Prophylactic measure

## 2022-05-24 NOTE — DIETITIAN INITIAL EVALUATION ADULT - DIET TYPE
Ensure Enlive  1can ( 240ml ) x bid ( 700 kcal, 40 g protein) Ensure Enlive 1can daily as medically feasible (350kcal, 20g protein ) to start; May adjust as needed

## 2022-05-24 NOTE — PROGRESS NOTE ADULT - TIME BILLING
- Review of records, telemetry, vital signs and daily labs.   - General and cardiovascular physical examination.  - Generation of cardiovascular treatment plan.  - Coordination of care.      Patient was seen and examined by me on 5/23/22,interim events noted,labs and radiology studies reviewed.  Jean Membreno MD,FACC.  [address] [place], [place] [id number].  [phone]
- Review of records, telemetry, vital signs and daily labs.   - General and cardiovascular physical examination.  - Generation of cardiovascular treatment plan.  - Coordination of care.      Patient was seen and examined by me on5/24/22,interim events noted,labs and radiology studies reviewed.  Jean Membreno MD,FACC.  [address] [place], [place] [id number].  [phone]
- Review of records, telemetry, vital signs and daily labs.   - General and cardiovascular physical examination.  - Generation of cardiovascular treatment plan.  - Coordination of care.      Patient was seen and examined by me on 5/22/22,interim events noted,labs and radiology studies reviewed.  Jean Membreno MD,FACC.  [address] [place], [place] [id number].  [phone]

## 2022-05-24 NOTE — PROGRESS NOTE ADULT - PROBLEM SELECTOR PLAN 2
Pt c/o b/l knee pain  small effusions bilateral  conservative management  PT follow up   tylenol for pain unable to recall 3 components of date post 5 minutes, repeating self, unable to recall directions immeidately/impaired

## 2022-05-24 NOTE — PROGRESS NOTE ADULT - SUBJECTIVE AND OBJECTIVE BOX
PATIENT SEEN AND EXAMINED ON :- 5/24/22  DATE OF SERVICE:    5/24/22         Interim events noted,Labs ,Radiological studies and Cardiology tests reviewed .         HOSPITAL COURSE: HPI:  Patient is a 75 y/o female, from home, baseline  and ambulatory w/ wheelchair, with significant medical history of Sarcoidosis, and Hypothyroidism who was BIBEMS after she had lost consciousness in a sitting position and slipped from her wheelchair. Patient is pleasant on interview and reports that she has  no recollection of the event; she only remembers waking up in the ambulance. Collateral history was collected from daughter Dariana who reports that the patient was sitting in her wheelchair when she had a sudden LOC and slumped to the floor without headstrike or other trauma. Despite trying to awaken her, she remained unconscious for >15 minutes according to her family. no tongue bite, urinary incontinence, tonic clonic movements, or preceding dysarthria or facial droop. Patient only complains of lower abdominal pain, less PO intake for a few days due to nausea, and dysuria w/ burning. She denies any headaches, visual disturbances, V/D, dizziness, cough, chest pain, palpitations, lower extremity swelling, or fevers. No new medications except a dementia medication which the daughter is unable to remember the name of.  (21 May 2022 23:54)      INTERIM EVENTS:Patient seen at bedside ,interim events noted.      PMH -reviewed admission note, no change since admission  HEART FAILURE: Acute[ ]Chronic[ ] Systolic[ ] Diastolic[ ] Combined Systolic and Diastolic[ ]  CAD[ ] CABG[ ] PCI[ ]  DEVICES[ ] PPM[ ] ICD[ ] ILR[ ]  ATRIAL FIBRILLATION[ ] Paroxysmal[ ] Permanent[ ]  VARGAS[ ] CKD1[ ] CKD2[ ] CKD3[ ] CKD4[ ] ESRD[ ]  COPD[ ] HTN[ ]   DM[ ] Type1[ ] Type 2[ ]   CVA[ ] Paresis[ ]    AMBULATION: Assisted[ ] Cane/walker[ ] Independent[ ]    MEDICATIONS  (STANDING):  enoxaparin Injectable 40 milliGRAM(s) SubCutaneous every 12 hours  levothyroxine 50 MICROGram(s) Oral daily  sodium chloride 0.9%. 1000 milliLiter(s) (65 mL/Hr) IV Continuous <Continuous>    MEDICATIONS  (PRN):  acetaminophen     Tablet .. 650 milliGRAM(s) Oral every 6 hours PRN Temp greater or equal to 38C (100.4F), Mild Pain (1 - 3)  ondansetron Injectable 4 milliGRAM(s) IV Push every 8 hours PRN Nausea and/or Vomiting            REVIEW OF SYSTEMS:  Constitutional: [ ] fever, [ ]weight loss,  [ ]fatigue  Eyes: [ ] visual changes  Respiratory: [ ]shortness of breath;  [ ] cough, [ ]wheezing, [ ]chills, [ ]hemoptysis  Cardiovascular: [ ] chest pain, [ ]palpitations, [ ]dizziness,  [ ]leg swelling[ ]orthopnea[ ]PND  Gastrointestinal: [ ] abdominal pain, [ ]nausea, [ ]vomiting,  [ ]diarrhea [ ]Constipation [ ]Melena  Genitourinary: [ ] dysuria, [ ] hematuria [ ]Quintero  Neurologic: [ ] headaches [ ] tremors[ ]weakness [ ]Paralysis Right[ ] Left[ ]  Skin: [ ] itching, [ ]burning, [ ] rashes  Endocrine: [ ] heat or cold intolerance  Musculoskeletal: [ ] joint pain or swelling; [ ] muscle, back, or extremity pain  Psychiatric: [ ] depression, [ ]anxiety, [ ]mood swings, or [ ]difficulty sleeping  Hematologic: [ ] easy bruising, [ ] bleeding gums    [ ] All remaining systems negative except as per above.   [ ]Unable to obtain.  [x] No change in ROS since admission      Vital Signs Last 24 Hrs  T(C): 36.8 (24 May 2022 15:51), Max: 36.8 (24 May 2022 05:02)  T(F): 98.2 (24 May 2022 15:51), Max: 98.3 (24 May 2022 05:02)  HR: 75 (24 May 2022 15:51) (65 - 103)  BP: 149/64 (24 May 2022 15:51) (108/87 - 149/64)  BP(mean): --  RR: 18 (24 May 2022 15:51) (18 - 19)  SpO2: 98% (24 May 2022 15:51) (98% - 100%)  I&O's Summary      PHYSICAL EXAM:  General: No acute distress BMI-  HEENT: EOMI, PERRL  Neck: Supple, [ ] JVD  Lungs: Equal air entry bilaterally; [ ] rales [ ] wheezing [ ] rhonchi  Heart: Regular rate and rhythm; [x ] murmur   2/6 [ x] systolic [ ] diastolic [ ] radiation[ ] rubs [ ]  gallops  Abdomen: Nontender, bowel sounds present  Extremities: No clubbing, cyanosis, [ ] edema [ ]Pulses  equal and intact  Nervous system:  Alert & Oriented X3, no focal deficits  Psychiatric: Normal affect  Skin: No rashes or lesions    LABS:  05-24    144  |  108  |  5<L>  ----------------------------<  88  3.7   |  29  |  0.53    Ca    9.2      24 May 2022 07:05      Creatinine Trend: 0.53<--, 0.48<--, 0.53<--, 0.58<--, 0.69<--                        11.7   7.93  )-----------( 368      ( 24 May 2022 07:05 )             36.6

## 2022-05-24 NOTE — DIETITIAN INITIAL EVALUATION ADULT - OTHER INFO
Pt lives home PTA, alert, verbally responsive, able to answer simple/basic questions, but confused with dementia per chart; appetite good, unclear recent wt changes, denied GI distress, chewing or swallowing problem at present, no specific food choices reported, observed breakfast <25% intake today, 0 to 25% intake at time per flow sheet, decreased intake x a few days per MD; Wt data in EMR reviewed, a bit fluctuated, may due to scale/fluid variance

## 2022-05-24 NOTE — PROGRESS NOTE ADULT - PROBLEM SELECTOR PLAN 1
Pt witnessed to have LOC in sitting position, slumping to ground, down 15 mins   Unclear etiology but in setting of sarcoidosis, cannot rule out cardiogenic causes including arrythmia   Other causes include hypokalemia triggered arrythmia (2.8 on admission), ?UTI (hx of carbapenem rst org)  C/w tele monitoring, F/u Echo to r/o valvular abnormalities   Potassium repleted   Check orthostatics    F/u TSH, BMP for rpt K+,   will straight cath for Urinalysis, and UCx  EEG as pt has LOC for more than 15min
Pt witnessed to have LOC in sitting position, slumping to ground, down 15 mins   Unclear etiology but in setting of sarcoidosis, cannot rule out cardiogenic causes including arrythmia   Other causes include hypokalemia triggered arrythmia (2.8 on admission), ?UTI (hx of carbapenem rst org)  C/w tele monitoring, F/u Echo to r/o valvular abnormalities   Potassium repleted   Check orthostatics    F/u TSH, BMP for rpt K+,   will straight cath for Urinalysis, and UCx  EEG as pt has LOC for more than 15min
Pt witnessed to have LOC in sitting position, slumping to ground, down 15 mins   ECHO showed grade 1 DD, EF >70%  TSH WBL  Neurology was consulted: EEG no seizures, will get MRI  orthostatic VS and cardiac vs neuro   f/u orthostats
Pt witnessed to have LOC in sitting position, slumping to ground, down 15 mins   ECHO showed grade 1 DD, EF >70%  TSH WBL  Neurology was consulted: EEG no seizures, will get MRI  orthostatic VS and cardiac vs neuro   f/u orthostats
Pt witnessed to have LOC in sitting position, slumping to ground, down 15 mins   ECHO showed grade 1 DD, EF >70%  TSH WBL  Neurology was consulted: EEG no seizures,  orthostatic VS and cardiac vs neuro   f/u MRI
Pt witnessed to have LOC in sitting position, slumping to ground, down 15 mins   ECHO showed grade 1 DD, EF >70%  TSH WBL  Neurology was consulted: EEG no seizures,  orthostatic VS and cardiac vs neuro   f/u MRI

## 2022-05-24 NOTE — DIETITIAN INITIAL EVALUATION ADULT - PERTINENT MEDS FT
MEDICATIONS  (STANDING):  cefTRIAXone   IVPB 1000 milliGRAM(s) IV Intermittent every 24 hours  enoxaparin Injectable 40 milliGRAM(s) SubCutaneous every 12 hours  levothyroxine 50 MICROGram(s) Oral daily  sodium chloride 0.9% Bolus 500 milliLiter(s) IV Bolus once  sodium chloride 0.9%. 1000 milliLiter(s) (65 mL/Hr) IV Continuous <Continuous>    MEDICATIONS  (PRN):  acetaminophen     Tablet .. 650 milliGRAM(s) Oral every 6 hours PRN Temp greater or equal to 38C (100.4F), Mild Pain (1 - 3)  ondansetron Injectable 4 milliGRAM(s) IV Push every 8 hours PRN Nausea and/or Vomiting

## 2022-05-24 NOTE — PROGRESS NOTE ADULT - PROBLEM SELECTOR PROBLEM 1
Witnessed syncope

## 2022-05-24 NOTE — PROGRESS NOTE ADULT - ASSESSMENT
Patient is a 75 y/o female, from home, baseline  and ambulatory w/ wheelchair, with significant medical history of Sarcoidosis, and Hypothyroidism who was BIBEMS after she had lost consciousness in a sitting position and slipped from her wheelchair. Admitted for syncope workup.       
Patient is a 75 y/o female, from home, baseline  and ambulatory w/ wheelchair, with significant medical history of Sarcoidosis, and Hypothyroidism who was BIBEMS after she had lost consciousness in a sitting position and slipped from her wheelchair. Admitted for syncope workup.       
Patient is a 77 y/o female, from home, baseline  and ambulatory w/ wheelchair, with significant medical history of Sarcoidosis, and Hypothyroidism who was BIBEMS after she had lost consciousness in a sitting position and slipped from her wheelchair. Admitted for syncope workup.       
Patient is a 77 y/o female, from home, baseline  and ambulatory w/ wheelchair, with significant medical history of Sarcoidosis, and Hypothyroidism who was BIBEMS after she had lost consciousness in a sitting position and slipped from her wheelchair. Admitted for syncope workup.       
Patient is a 75 y/o female, from home, baseline  and ambulatory w/ wheelchair, with significant medical history of Sarcoidosis, and Hypothyroidism who was BIBEMS after she had lost consciousness in a sitting position and slipped from her wheelchair. Admitted for syncope workup.       
Patient is a 77 y/o female, from home, baseline  and ambulatory w/ wheelchair, with significant medical history of Sarcoidosis, and Hypothyroidism who was BIBEMS after she had lost consciousness in a sitting position and slipped from her wheelchair. Admitted for syncope workup.

## 2022-08-15 NOTE — PROGRESS NOTE ADULT - SUBJECTIVE AND OBJECTIVE BOX
8/16/2022    Shahana Stringer  1967  [id number]        Assessment  -History of microscopic hematuria s/p negative hematuria workup (2014)  -Urinary urge incontinence    Discussion/Plan  Glory Jhaveri is a 54 y o  female being managed by our office    1  Urinary urge incontinence- PVR in the office today is 26 mL  Recent urine testing showed no evidence of micro hematuria  We discussed follow-up with pelvic floor physical therapy for further evaluation and management of mixed urinary incontinence  She will consider this option  Patient has no urinary complaints at this time  She will remain on trospium 20 mg b i d  Prescription refill was electronically sent to her pharmacy  We discussed that future refills can be managed by her PCP and patient will follow up with our office on as-needed basis  She is amenable with this plan  Follow-up as needed  She was advised to call sooner with any questions or issues     -All questions answered, patients agree with plan     History of Present Illness  54 y o  female with a history of urinary urge incontinence and microhematuria presents today for follow up  Patient last seen in the office in August 2021  She remains on trospium 20 mg b i d  for management of urinary urgency  Additional history includes negative micro hematuria workup in 2014  Recent urinalysis with microscopic showed no evidence of RBC  She has no urinary complaints at this time and denies any gross hematuria or dysuria  Patient does know occasional episodes of urinary stress incontinence, but does not find bothersome  She denies any additional changes to her overall health  Review of Systems  Review of Systems   Constitutional: Negative  HENT: Negative  Respiratory: Negative  Cardiovascular: Negative  Gastrointestinal: Negative  Genitourinary: Negative for decreased urine volume, difficulty urinating, dysuria, flank pain, frequency, hematuria, pelvic pain and urgency  Musculoskeletal: Negative  Skin: Negative  Neurological: Negative  Psychiatric/Behavioral: Negative          Past Medical History  Past Medical History:   Diagnosis Date    Anxiety     Depression     Incontinence     MVP (mitral valve prolapse)     Sleep apnea     mild to moderate, had cpap device but was recalled, not replaced yet       Past Social History  Past Surgical History:   Procedure Laterality Date    APPENDECTOMY      COLONOSCOPY      DILATION AND CURETTAGE OF UTERUS      HAND SURGERY      KELOID EXCISION      KELOID EXCISION Right     arm    NO PAST SURGERIES      WISDOM TOOTH EXTRACTION         Past Family History  Family History   Problem Relation Age of Onset    Heart disease Father     Hyperlipidemia Mother     Glaucoma Mother     Cancer Paternal Grandmother         Bladder CA    Angina Paternal Grandfather        Past Social history  Social History     Socioeconomic History    Marital status: /Civil Union     Spouse name: Not on file    Number of children: Not on file    Years of education: Not on file    Highest education level: Not on file   Occupational History    Occupation: working    Tobacco Use    Smoking status: Never Smoker    Smokeless tobacco: Never Used   Vaping Use    Vaping Use: Never used   Substance and Sexual Activity    Alcohol use: Yes     Comment: rare socially    Drug use: No    Sexual activity: Yes     Partners: Male   Other Topics Concern    Not on file   Social History Narrative    Not on file     Social Determinants of Health     Financial Resource Strain: Not on file   Food Insecurity: Not on file   Transportation Needs: Not on file   Physical Activity: Not on file   Stress: Not on file   Social Connections: Not on file   Intimate Partner Violence: Not on file   Housing Stability: Not on file       Current Medications  Current Outpatient Medications   Medication Sig Dispense Refill    escitalopram (LEXAPRO) 10 mg tablet Take 1 tablet (10 mg total) by mouth daily 90 tablet 0    escitalopram (LEXAPRO) 5 mg tablet Take 1 tablet (5 mg total) by mouth daily 90 tablet 0    Tri-Tiffany 0 01-4-0 05 % CREA       trospium chloride (SANCTURA) 20 mg tablet Take 1 tablet (20 mg total) by mouth 2 (two) times a day 180 tablet 3    clonazePAM (KlonoPIN) 0 5 mg tablet Take 1 tablet (0 5 mg total) by mouth daily as needed for anxiety (Patient not taking: Reported on 8/16/2022) 30 tablet 0     No current facility-administered medications for this visit  Allergies  No Known Allergies    Past medical history, social history, family history, medications and allergies were reviewed  Vitals  Vitals:    08/16/22 1500   BP: 130/60   Weight: 93 kg (205 lb)   Height: 5' 4" (1 626 m)       Physical Exam  Physical Exam  Constitutional:       Appearance: Normal appearance  She is well-developed  HENT:      Head: Normocephalic  Eyes:      Pupils: Pupils are equal, round, and reactive to light  Pulmonary:      Effort: Pulmonary effort is normal    Abdominal:      Palpations: Abdomen is soft  Musculoskeletal:         General: Normal range of motion  Cervical back: Normal range of motion  Skin:     General: Skin is warm and dry  Neurological:      General: No focal deficit present  Mental Status: She is alert and oriented to person, place, and time  Psychiatric:         Mood and Affect: Mood normal          Behavior: Behavior normal          Thought Content:  Thought content normal          Judgment: Judgment normal          Results    I have personally reviewed all pertinent lab results and reviewed with patient  Lab Results   Component Value Date    GLUCOSE 79 01/31/2014    CALCIUM 9 1 07/15/2022     06/14/2016    K 4 4 07/15/2022    CO2 26 07/15/2022     07/15/2022    BUN 15 07/15/2022    CREATININE 0 70 07/15/2022     Lab Results   Component Value Date    WBC 7 4 07/15/2022    HGB 13 6 07/15/2022    HCT 40 7 07/15/2022 MCV 87 0 07/15/2022     07/15/2022     Recent Results (from the past 1 hour(s))   POCT Measure PVR    Collection Time: 08/16/22  3:08 PM   Result Value Ref Range    POST-VOID RESIDUAL VOLUME, ML POC 26 mL NP Note discussed with  Primary Attending    INTERVAL HPI/OVERNIGHT EVENTS: no new complaints    MEDICATIONS  (STANDING):  enoxaparin Injectable 40 milliGRAM(s) SubCutaneous daily  levothyroxine 50 MICROGram(s) Oral daily    MEDICATIONS  (PRN):  guaiFENesin Oral Liquid (Sugar-Free) 100 milliGRAM(s) Oral every 6 hours PRN Cough      __________________________________________________  REVIEW OF SYSTEMS:    CONSTITUTIONAL: No fever,   EYES: no acute visual disturbances  NECK: No pain or stiffness  RESPIRATORY: No cough; No shortness of breath  CARDIOVASCULAR: No chest pain, no palpitations  GASTROINTESTINAL: No pain. No nausea or vomiting; No diarrhea   NEUROLOGICAL: No headache or numbness, no tremors  MUSCULOSKELETAL: No joint pain, no muscle pain  GENITOURINARY: no dysuria, no frequency, no hesitancy  PSYCHIATRY: no depression , no anxiety  ALL OTHER  ROS negative        Vital Signs Last 24 Hrs  T(C): 36.8 (19 Jan 2022 06:10), Max: 36.9 (18 Jan 2022 20:52)  T(F): 98.3 (19 Jan 2022 06:10), Max: 98.4 (18 Jan 2022 20:52)  HR: 78 (19 Jan 2022 06:10) (75 - 82)  BP: 112/52 (19 Jan 2022 06:10) (112/52 - 129/70)  BP(mean): --  RR: 18 (19 Jan 2022 06:10) (18 - 18)  SpO2: 94% (19 Jan 2022 06:10) (94% - 95%)    ________________________________________________  PHYSICAL EXAM:  GENERAL: NAD, pleasant  HEENT: Normocephalic;  conjunctivae and sclerae clear; moist mucous membranes;   NECK : supple  CHEST/LUNG: Clear to auscultation bilaterally with fair air entry no wheezing  HEART: S1 S2  regular; no murmurs, gallops or rubs  ABDOMEN: Soft, Nontender, Nondistended; Bowel sounds present  EXTREMITIES: no cyanosis; no edema; no calf tenderness  SKIN: warm and dry; no rash  NERVOUS SYSTEM:  Awake and alert; Oriented  to self and place; no new deficits    _________________________________________________  LABS:    CAPILLARY BLOOD GLUCOSE    RADIOLOGY & ADDITIONAL TESTS:    Imaging  Reviewed:  YES  < from: CT Head No Cont (01.10.22 @ 15:18) >    ACC: [id number] EXAM:  CT BRAIN                          PROCEDURE DATE:  01/10/2022          INTERPRETATION:  Clinical indications: Syncope.    Multiple axial sections were performed from base skull to vertex without   contrast enhancement. Coronal and sagittal destructions were performed as   well    This exam is somewhat limited by motion    Parenchymal volume loss and chronic microvascular ischemic changes are   identified.    Grossly, there is no acute hemorrhage mass or mass effect seen.    Evaluation of the osseous structures with the appropriate window appears   unremarkable    The visualized paranasal sinuses mastoid and middle ear regions appear   clear.    IMPRESSION: This exam is somewhat limited by motion. Grossly, no evidence   of acute hemorrhage mass poor mass effect is seen. If symptoms continue   MRI can be done for further evaluation if clinically indicated and if   there are no contraindications.    --- End of Report ---            APOLONIA FOSTER MD; Attending Radiologist  This document has been electronically signed. Ryan 10 2022  3:21PM    < end of copied text >  < from: Xray Chest 1 View-PORTABLE IMMEDIATE (01.10.22 @ 15:14) >    ACC: [id number] EXAM:  XR CHEST PORTABLE IMMED 1V                          PROCEDURE DATE:  01/10/2022          INTERPRETATION:  AP supine chest on January 10, 2022 at 2:49 PM. Patient   has sepsis.    COMPARISON: None available.    Heart magnified by technique.    Scattered mid lower lung field infiltrates are noted suggesting Covid   pneumonia.    Advanced bilateral shoulder degeneration seen.    IMPRESSION: Bilateral infiltrates as above.    --- End of Report ---  RITCHIE ESTEVEZ MD; Attending Radiologist  This document has been electronically signed. Ryan 10 2022  3:17PM    < end of copied text >    Consultant(s) Notes Reviewed:   YES      Plan of care was discussed with patient and /or primary care giver; all questions and concerns were addressed

## 2022-09-17 ENCOUNTER — INPATIENT (INPATIENT)
Facility: HOSPITAL | Age: 77
LOS: 5 days | Discharge: HOME | DRG: 417 | End: 2022-09-23
Attending: STUDENT IN AN ORGANIZED HEALTH CARE EDUCATION/TRAINING PROGRAM | Admitting: STUDENT IN AN ORGANIZED HEALTH CARE EDUCATION/TRAINING PROGRAM
Payer: MEDICARE

## 2022-09-17 VITALS
OXYGEN SATURATION: 96 % | HEIGHT: 62 IN | RESPIRATION RATE: 19 BRPM | TEMPERATURE: 98 F | WEIGHT: 179.9 LBS | SYSTOLIC BLOOD PRESSURE: 117 MMHG | HEART RATE: 76 BPM | DIASTOLIC BLOOD PRESSURE: 76 MMHG

## 2022-09-17 LAB
ACETONE SERPL-MCNC: NEGATIVE — SIGNIFICANT CHANGE UP
ALBUMIN SERPL ELPH-MCNC: 2.3 G/DL — LOW (ref 3.5–5)
ALP SERPL-CCNC: 408 U/L — HIGH (ref 40–120)
ALT FLD-CCNC: 464 U/L DA — HIGH (ref 10–60)
ANION GAP SERPL CALC-SCNC: 12 MMOL/L — SIGNIFICANT CHANGE UP (ref 5–17)
APPEARANCE UR: ABNORMAL
AST SERPL-CCNC: 1665 U/L — HIGH (ref 10–40)
BACTERIA # UR AUTO: ABNORMAL /HPF
BASOPHILS # BLD AUTO: 0.08 K/UL — SIGNIFICANT CHANGE UP (ref 0–0.2)
BASOPHILS NFR BLD AUTO: 0.3 % — SIGNIFICANT CHANGE UP (ref 0–2)
BILIRUB SERPL-MCNC: 3.2 MG/DL — HIGH (ref 0.2–1.2)
BILIRUB UR-MCNC: ABNORMAL
BUN SERPL-MCNC: 10 MG/DL — SIGNIFICANT CHANGE UP (ref 7–18)
CALCIUM SERPL-MCNC: 8.5 MG/DL — SIGNIFICANT CHANGE UP (ref 8.4–10.5)
CHLORIDE SERPL-SCNC: 101 MMOL/L — SIGNIFICANT CHANGE UP (ref 96–108)
CO2 SERPL-SCNC: 27 MMOL/L — SIGNIFICANT CHANGE UP (ref 22–31)
COLOR SPEC: ABNORMAL
CREAT SERPL-MCNC: 1.01 MG/DL — SIGNIFICANT CHANGE UP (ref 0.5–1.3)
DIFF PNL FLD: ABNORMAL
EGFR: 57 ML/MIN/1.73M2 — LOW
EOSINOPHIL # BLD AUTO: 0.01 K/UL — SIGNIFICANT CHANGE UP (ref 0–0.5)
EOSINOPHIL NFR BLD AUTO: 0 % — SIGNIFICANT CHANGE UP (ref 0–6)
EPI CELLS # UR: ABNORMAL /HPF
GLUCOSE SERPL-MCNC: 123 MG/DL — HIGH (ref 70–99)
GLUCOSE UR QL: NEGATIVE — SIGNIFICANT CHANGE UP
HCT VFR BLD CALC: 41.1 % — SIGNIFICANT CHANGE UP (ref 34.5–45)
HGB BLD-MCNC: 13.2 G/DL — SIGNIFICANT CHANGE UP (ref 11.5–15.5)
IMM GRANULOCYTES NFR BLD AUTO: 0.8 % — SIGNIFICANT CHANGE UP (ref 0–0.9)
KETONES UR-MCNC: ABNORMAL
LACTATE SERPL-SCNC: 2.9 MMOL/L — HIGH (ref 0.7–2)
LEUKOCYTE ESTERASE UR-ACNC: ABNORMAL
LIDOCAIN IGE QN: 2834 U/L — HIGH (ref 73–393)
LYMPHOCYTES # BLD AUTO: 0.79 K/UL — LOW (ref 1–3.3)
LYMPHOCYTES # BLD AUTO: 3.3 % — LOW (ref 13–44)
MAGNESIUM SERPL-MCNC: 1.9 MG/DL — SIGNIFICANT CHANGE UP (ref 1.6–2.6)
MCHC RBC-ENTMCNC: 29.4 PG — SIGNIFICANT CHANGE UP (ref 27–34)
MCHC RBC-ENTMCNC: 32.1 GM/DL — SIGNIFICANT CHANGE UP (ref 32–36)
MCV RBC AUTO: 91.5 FL — SIGNIFICANT CHANGE UP (ref 80–100)
MONOCYTES # BLD AUTO: 1.1 K/UL — HIGH (ref 0–0.9)
MONOCYTES NFR BLD AUTO: 4.6 % — SIGNIFICANT CHANGE UP (ref 2–14)
NEUTROPHILS # BLD AUTO: 21.55 K/UL — HIGH (ref 1.8–7.4)
NEUTROPHILS NFR BLD AUTO: 91 % — HIGH (ref 43–77)
NITRITE UR-MCNC: NEGATIVE — SIGNIFICANT CHANGE UP
NRBC # BLD: 0 /100 WBCS — SIGNIFICANT CHANGE UP (ref 0–0)
NT-PROBNP SERPL-SCNC: 1135 PG/ML — HIGH (ref 0–450)
PH UR: 5 — SIGNIFICANT CHANGE UP (ref 5–8)
PLATELET # BLD AUTO: 440 K/UL — HIGH (ref 150–400)
POTASSIUM SERPL-MCNC: 2.6 MMOL/L — CRITICAL LOW (ref 3.5–5.3)
POTASSIUM SERPL-SCNC: 2.6 MMOL/L — CRITICAL LOW (ref 3.5–5.3)
PROT SERPL-MCNC: 7.6 G/DL — SIGNIFICANT CHANGE UP (ref 6–8.3)
PROT UR-MCNC: 100
RAPID RVP RESULT: SIGNIFICANT CHANGE UP
RBC # BLD: 4.49 M/UL — SIGNIFICANT CHANGE UP (ref 3.8–5.2)
RBC # FLD: 15.3 % — HIGH (ref 10.3–14.5)
RBC CASTS # UR COMP ASSIST: ABNORMAL /HPF (ref 0–2)
SARS-COV-2 RNA SPEC QL NAA+PROBE: SIGNIFICANT CHANGE UP
SODIUM SERPL-SCNC: 140 MMOL/L — SIGNIFICANT CHANGE UP (ref 135–145)
SP GR SPEC: 1.01 — SIGNIFICANT CHANGE UP (ref 1.01–1.02)
TROPONIN I, HIGH SENSITIVITY RESULT: 29.1 NG/L — SIGNIFICANT CHANGE UP
UROBILINOGEN FLD QL: 12
WBC # BLD: 23.71 K/UL — HIGH (ref 3.8–10.5)
WBC # FLD AUTO: 23.71 K/UL — HIGH (ref 3.8–10.5)
WBC UR QL: ABNORMAL /HPF (ref 0–5)

## 2022-09-17 PROCEDURE — 76705 ECHO EXAM OF ABDOMEN: CPT | Mod: 26

## 2022-09-17 PROCEDURE — 99285 EMERGENCY DEPT VISIT HI MDM: CPT

## 2022-09-17 PROCEDURE — 71045 X-RAY EXAM CHEST 1 VIEW: CPT | Mod: 26

## 2022-09-17 PROCEDURE — 93010 ELECTROCARDIOGRAM REPORT: CPT

## 2022-09-17 PROCEDURE — 74177 CT ABD & PELVIS W/CONTRAST: CPT | Mod: 26,MA

## 2022-09-17 RX ORDER — POTASSIUM CHLORIDE 20 MEQ
10 PACKET (EA) ORAL
Refills: 0 | Status: COMPLETED | OUTPATIENT
Start: 2022-09-17 | End: 2022-09-17

## 2022-09-17 RX ORDER — CEFTRIAXONE 500 MG/1
1000 INJECTION, POWDER, FOR SOLUTION INTRAMUSCULAR; INTRAVENOUS ONCE
Refills: 0 | Status: COMPLETED | OUTPATIENT
Start: 2022-09-17 | End: 2022-09-17

## 2022-09-17 RX ORDER — PIPERACILLIN AND TAZOBACTAM 4; .5 G/20ML; G/20ML
3.38 INJECTION, POWDER, LYOPHILIZED, FOR SOLUTION INTRAVENOUS ONCE
Refills: 0 | Status: COMPLETED | OUTPATIENT
Start: 2022-09-17 | End: 2022-09-17

## 2022-09-17 RX ORDER — AZITHROMYCIN 500 MG/1
500 TABLET, FILM COATED ORAL ONCE
Refills: 0 | Status: COMPLETED | OUTPATIENT
Start: 2022-09-17 | End: 2022-09-17

## 2022-09-17 RX ORDER — SODIUM CHLORIDE 9 MG/ML
1550 INJECTION INTRAMUSCULAR; INTRAVENOUS; SUBCUTANEOUS ONCE
Refills: 0 | Status: COMPLETED | OUTPATIENT
Start: 2022-09-17 | End: 2022-09-17

## 2022-09-17 RX ORDER — POTASSIUM CHLORIDE 20 MEQ
40 PACKET (EA) ORAL ONCE
Refills: 0 | Status: COMPLETED | OUTPATIENT
Start: 2022-09-17 | End: 2022-09-17

## 2022-09-17 RX ORDER — ACETAMINOPHEN 500 MG
650 TABLET ORAL ONCE
Refills: 0 | Status: COMPLETED | OUTPATIENT
Start: 2022-09-17 | End: 2022-09-17

## 2022-09-17 RX ADMIN — Medication 650 MILLIGRAM(S): at 18:41

## 2022-09-17 RX ADMIN — AZITHROMYCIN 255 MILLIGRAM(S): 500 TABLET, FILM COATED ORAL at 17:57

## 2022-09-17 RX ADMIN — Medication 100 MILLIEQUIVALENT(S): at 20:13

## 2022-09-17 RX ADMIN — SODIUM CHLORIDE 3100 MILLILITER(S): 9 INJECTION INTRAMUSCULAR; INTRAVENOUS; SUBCUTANEOUS at 17:03

## 2022-09-17 RX ADMIN — Medication 100 MILLIEQUIVALENT(S): at 18:05

## 2022-09-17 RX ADMIN — Medication 40 MILLIEQUIVALENT(S): at 18:05

## 2022-09-17 RX ADMIN — PIPERACILLIN AND TAZOBACTAM 200 GRAM(S): 4; .5 INJECTION, POWDER, LYOPHILIZED, FOR SOLUTION INTRAVENOUS at 20:12

## 2022-09-17 RX ADMIN — CEFTRIAXONE 100 MILLIGRAM(S): 500 INJECTION, POWDER, FOR SOLUTION INTRAMUSCULAR; INTRAVENOUS at 17:29

## 2022-09-17 RX ADMIN — Medication 650 MILLIGRAM(S): at 17:29

## 2022-09-17 NOTE — ED PROVIDER NOTE - CARE PLAN
1 Principal Discharge DX:	Acute pancreatitis  Secondary Diagnosis:	Hypokalemia   Principal Discharge DX:	Acute pancreatitis  Secondary Diagnosis:	Hypokalemia  Secondary Diagnosis:	Cholelithiasis

## 2022-09-17 NOTE — ED PROVIDER NOTE - OBJECTIVE STATEMENT
77 y.o. female BIBA reportedly pt is dementia.  pt's daughter Dariana Doe [phone], as per daughter, pt's bedridden, pt does not comprehend anything, more confused today, no appetite, foul smelling urine, weakness, no coughing, vomiting.  Pt lives with granddaughter & grandson

## 2022-09-17 NOTE — ED ADULT TRIAGE NOTE - CHIEF COMPLAINT QUOTE
as per ems family called for altered mental status,pt recently diagnosed with dementia,pt does not chew foofood ,unable to recognize family member today at 10 am ,with dark and foul smell urine,fever 102 by ems,pt is bedbound at home

## 2022-09-17 NOTE — ED ADULT NURSE NOTE - NSIMPLEMENTINTERV_GEN_ALL_ED
Implemented All Fall Risk Interventions:  [place] to call system. Call bell, personal items and telephone within reach. Instruct patient to call for assistance. Room bathroom lighting operational. Non-slip footwear when patient is off stretcher. Physically safe environment: no spills, clutter or unnecessary equipment. Stretcher in lowest position, wheels locked, appropriate side rails in place. Provide visual cue, wrist band, yellow gown, etc. Monitor gait and stability. Monitor for mental status changes and reorient to person, place, and time. Review medications for side effects contributing to fall risk. Reinforce activity limits and safety measures with patient and family.

## 2022-09-17 NOTE — ED PROVIDER NOTE - CLINICAL SUMMARY MEDICAL DECISION MAKING FREE TEXT BOX
pt with acute confusion, febrile, dehydration, concern for infectious process, will get labs., give Abx, admission

## 2022-09-18 DIAGNOSIS — N39.0 URINARY TRACT INFECTION, SITE NOT SPECIFIED: ICD-10-CM

## 2022-09-18 DIAGNOSIS — E87.6 HYPOKALEMIA: ICD-10-CM

## 2022-09-18 DIAGNOSIS — K85.90 ACUTE PANCREATITIS WITHOUT NECROSIS OR INFECTION, UNSPECIFIED: ICD-10-CM

## 2022-09-18 DIAGNOSIS — K85.10 BILIARY ACUTE PANCREATITIS WITHOUT NECROSIS OR INFECTION: ICD-10-CM

## 2022-09-18 DIAGNOSIS — Z29.9 ENCOUNTER FOR PROPHYLACTIC MEASURES, UNSPECIFIED: ICD-10-CM

## 2022-09-18 DIAGNOSIS — D86.9 SARCOIDOSIS, UNSPECIFIED: ICD-10-CM

## 2022-09-18 DIAGNOSIS — G93.40 ENCEPHALOPATHY, UNSPECIFIED: ICD-10-CM

## 2022-09-18 DIAGNOSIS — K52.9 NONINFECTIVE GASTROENTERITIS AND COLITIS, UNSPECIFIED: ICD-10-CM

## 2022-09-18 DIAGNOSIS — F03.90 UNSPECIFIED DEMENTIA WITHOUT BEHAVIORAL DISTURBANCE: ICD-10-CM

## 2022-09-18 DIAGNOSIS — E03.9 HYPOTHYROIDISM, UNSPECIFIED: ICD-10-CM

## 2022-09-18 LAB
ALBUMIN SERPL ELPH-MCNC: 1.8 G/DL — LOW (ref 3.5–5)
ALBUMIN SERPL ELPH-MCNC: 1.9 G/DL — LOW (ref 3.5–5)
ALP SERPL-CCNC: 322 U/L — HIGH (ref 40–120)
ALP SERPL-CCNC: 325 U/L — HIGH (ref 40–120)
ALT FLD-CCNC: 396 U/L DA — HIGH (ref 10–60)
ALT FLD-CCNC: 406 U/L DA — HIGH (ref 10–60)
ANION GAP SERPL CALC-SCNC: 7 MMOL/L — SIGNIFICANT CHANGE UP (ref 5–17)
ANION GAP SERPL CALC-SCNC: 8 MMOL/L — SIGNIFICANT CHANGE UP (ref 5–17)
AST SERPL-CCNC: 1010 U/L — HIGH (ref 10–40)
AST SERPL-CCNC: 862 U/L — HIGH (ref 10–40)
BASOPHILS # BLD AUTO: 0.05 K/UL — SIGNIFICANT CHANGE UP (ref 0–0.2)
BASOPHILS NFR BLD AUTO: 0.3 % — SIGNIFICANT CHANGE UP (ref 0–2)
BILIRUB SERPL-MCNC: 3.1 MG/DL — HIGH (ref 0.2–1.2)
BILIRUB SERPL-MCNC: 3.4 MG/DL — HIGH (ref 0.2–1.2)
BUN SERPL-MCNC: 11 MG/DL — SIGNIFICANT CHANGE UP (ref 7–18)
BUN SERPL-MCNC: 12 MG/DL — SIGNIFICANT CHANGE UP (ref 7–18)
CALCIUM SERPL-MCNC: 7.4 MG/DL — LOW (ref 8.4–10.5)
CALCIUM SERPL-MCNC: 7.8 MG/DL — LOW (ref 8.4–10.5)
CHLORIDE SERPL-SCNC: 105 MMOL/L — SIGNIFICANT CHANGE UP (ref 96–108)
CHLORIDE SERPL-SCNC: 106 MMOL/L — SIGNIFICANT CHANGE UP (ref 96–108)
CO2 SERPL-SCNC: 25 MMOL/L — SIGNIFICANT CHANGE UP (ref 22–31)
CO2 SERPL-SCNC: 26 MMOL/L — SIGNIFICANT CHANGE UP (ref 22–31)
CREAT SERPL-MCNC: 0.67 MG/DL — SIGNIFICANT CHANGE UP (ref 0.5–1.3)
CREAT SERPL-MCNC: 0.73 MG/DL — SIGNIFICANT CHANGE UP (ref 0.5–1.3)
E COLI DNA BLD POS QL NAA+NON-PROBE: SIGNIFICANT CHANGE UP
EGFR: 85 ML/MIN/1.73M2 — SIGNIFICANT CHANGE UP
EGFR: 90 ML/MIN/1.73M2 — SIGNIFICANT CHANGE UP
EOSINOPHIL # BLD AUTO: 0.04 K/UL — SIGNIFICANT CHANGE UP (ref 0–0.5)
EOSINOPHIL NFR BLD AUTO: 0.2 % — SIGNIFICANT CHANGE UP (ref 0–6)
GLUCOSE SERPL-MCNC: 100 MG/DL — HIGH (ref 70–99)
GLUCOSE SERPL-MCNC: 99 MG/DL — SIGNIFICANT CHANGE UP (ref 70–99)
GRAM STN FLD: SIGNIFICANT CHANGE UP
GRAM STN FLD: SIGNIFICANT CHANGE UP
HCT VFR BLD CALC: 36.4 % — SIGNIFICANT CHANGE UP (ref 34.5–45)
HCT VFR BLD CALC: 38.7 % — SIGNIFICANT CHANGE UP (ref 34.5–45)
HGB BLD-MCNC: 12.1 G/DL — SIGNIFICANT CHANGE UP (ref 11.5–15.5)
HGB BLD-MCNC: 12.2 G/DL — SIGNIFICANT CHANGE UP (ref 11.5–15.5)
IMM GRANULOCYTES NFR BLD AUTO: 0.6 % — SIGNIFICANT CHANGE UP (ref 0–0.9)
LIDOCAIN IGE QN: 567 U/L — HIGH (ref 73–393)
LYMPHOCYTES # BLD AUTO: 0.9 K/UL — LOW (ref 1–3.3)
LYMPHOCYTES # BLD AUTO: 5.3 % — LOW (ref 13–44)
MAGNESIUM SERPL-MCNC: 1.7 MG/DL — SIGNIFICANT CHANGE UP (ref 1.6–2.6)
MCHC RBC-ENTMCNC: 29.4 PG — SIGNIFICANT CHANGE UP (ref 27–34)
MCHC RBC-ENTMCNC: 29.9 PG — SIGNIFICANT CHANGE UP (ref 27–34)
MCHC RBC-ENTMCNC: 31.5 GM/DL — LOW (ref 32–36)
MCHC RBC-ENTMCNC: 33.2 GM/DL — SIGNIFICANT CHANGE UP (ref 32–36)
MCV RBC AUTO: 89.9 FL — SIGNIFICANT CHANGE UP (ref 80–100)
MCV RBC AUTO: 93.3 FL — SIGNIFICANT CHANGE UP (ref 80–100)
METHOD TYPE: SIGNIFICANT CHANGE UP
MONOCYTES # BLD AUTO: 0.61 K/UL — SIGNIFICANT CHANGE UP (ref 0–0.9)
MONOCYTES NFR BLD AUTO: 3.6 % — SIGNIFICANT CHANGE UP (ref 2–14)
NEUTROPHILS # BLD AUTO: 15.22 K/UL — HIGH (ref 1.8–7.4)
NEUTROPHILS NFR BLD AUTO: 90 % — HIGH (ref 43–77)
NRBC # BLD: 0 /100 WBCS — SIGNIFICANT CHANGE UP (ref 0–0)
NRBC # BLD: 0 /100 WBCS — SIGNIFICANT CHANGE UP (ref 0–0)
PHOSPHATE SERPL-MCNC: 2.1 MG/DL — LOW (ref 2.5–4.5)
PHOSPHATE SERPL-MCNC: 2.6 MG/DL — SIGNIFICANT CHANGE UP (ref 2.5–4.5)
PLATELET # BLD AUTO: 330 K/UL — SIGNIFICANT CHANGE UP (ref 150–400)
PLATELET # BLD AUTO: 330 K/UL — SIGNIFICANT CHANGE UP (ref 150–400)
POTASSIUM SERPL-MCNC: 3.6 MMOL/L — SIGNIFICANT CHANGE UP (ref 3.5–5.3)
POTASSIUM SERPL-MCNC: 5 MMOL/L — SIGNIFICANT CHANGE UP (ref 3.5–5.3)
POTASSIUM SERPL-SCNC: 3.6 MMOL/L — SIGNIFICANT CHANGE UP (ref 3.5–5.3)
POTASSIUM SERPL-SCNC: 5 MMOL/L — SIGNIFICANT CHANGE UP (ref 3.5–5.3)
PROT SERPL-MCNC: 6.3 G/DL — SIGNIFICANT CHANGE UP (ref 6–8.3)
PROT SERPL-MCNC: 6.3 G/DL — SIGNIFICANT CHANGE UP (ref 6–8.3)
RBC # BLD: 4.05 M/UL — SIGNIFICANT CHANGE UP (ref 3.8–5.2)
RBC # BLD: 4.15 M/UL — SIGNIFICANT CHANGE UP (ref 3.8–5.2)
RBC # FLD: 15.7 % — HIGH (ref 10.3–14.5)
RBC # FLD: 15.9 % — HIGH (ref 10.3–14.5)
SODIUM SERPL-SCNC: 138 MMOL/L — SIGNIFICANT CHANGE UP (ref 135–145)
SODIUM SERPL-SCNC: 139 MMOL/L — SIGNIFICANT CHANGE UP (ref 135–145)
SPECIMEN SOURCE: SIGNIFICANT CHANGE UP
SPECIMEN SOURCE: SIGNIFICANT CHANGE UP
WBC # BLD: 16.08 K/UL — HIGH (ref 3.8–10.5)
WBC # BLD: 16.92 K/UL — HIGH (ref 3.8–10.5)
WBC # FLD AUTO: 16.08 K/UL — HIGH (ref 3.8–10.5)
WBC # FLD AUTO: 16.92 K/UL — HIGH (ref 3.8–10.5)

## 2022-09-18 PROCEDURE — 99222 1ST HOSP IP/OBS MODERATE 55: CPT | Mod: FS

## 2022-09-18 PROCEDURE — 99223 1ST HOSP IP/OBS HIGH 75: CPT | Mod: GC

## 2022-09-18 PROCEDURE — 12345: CPT | Mod: NC

## 2022-09-18 RX ORDER — ONDANSETRON 8 MG/1
4 TABLET, FILM COATED ORAL EVERY 6 HOURS
Refills: 0 | Status: DISCONTINUED | OUTPATIENT
Start: 2022-09-18 | End: 2022-09-21

## 2022-09-18 RX ORDER — PIPERACILLIN AND TAZOBACTAM 4; .5 G/20ML; G/20ML
3.38 INJECTION, POWDER, LYOPHILIZED, FOR SOLUTION INTRAVENOUS ONCE
Refills: 0 | Status: COMPLETED | OUTPATIENT
Start: 2022-09-18 | End: 2022-09-18

## 2022-09-18 RX ORDER — SODIUM CHLORIDE 9 MG/ML
1000 INJECTION, SOLUTION INTRAVENOUS
Refills: 0 | Status: DISCONTINUED | OUTPATIENT
Start: 2022-09-18 | End: 2022-09-18

## 2022-09-18 RX ORDER — METRONIDAZOLE 500 MG
500 TABLET ORAL EVERY 8 HOURS
Refills: 0 | Status: DISCONTINUED | OUTPATIENT
Start: 2022-09-18 | End: 2022-09-18

## 2022-09-18 RX ORDER — ONDANSETRON 8 MG/1
4 TABLET, FILM COATED ORAL EVERY 8 HOURS
Refills: 0 | Status: DISCONTINUED | OUTPATIENT
Start: 2022-09-18 | End: 2022-09-18

## 2022-09-18 RX ORDER — POTASSIUM CHLORIDE 20 MEQ
10 PACKET (EA) ORAL
Refills: 0 | Status: DISCONTINUED | OUTPATIENT
Start: 2022-09-18 | End: 2022-09-18

## 2022-09-18 RX ORDER — PANTOPRAZOLE SODIUM 20 MG/1
40 TABLET, DELAYED RELEASE ORAL DAILY
Refills: 0 | Status: DISCONTINUED | OUTPATIENT
Start: 2022-09-18 | End: 2022-09-20

## 2022-09-18 RX ORDER — LEVOTHYROXINE SODIUM 125 MCG
66 TABLET ORAL AT BEDTIME
Refills: 0 | Status: DISCONTINUED | OUTPATIENT
Start: 2022-09-18 | End: 2022-09-20

## 2022-09-18 RX ORDER — PIPERACILLIN AND TAZOBACTAM 4; .5 G/20ML; G/20ML
3.38 INJECTION, POWDER, LYOPHILIZED, FOR SOLUTION INTRAVENOUS EVERY 8 HOURS
Refills: 0 | Status: DISCONTINUED | OUTPATIENT
Start: 2022-09-19 | End: 2022-09-21

## 2022-09-18 RX ORDER — SODIUM CHLORIDE 9 MG/ML
1000 INJECTION INTRAMUSCULAR; INTRAVENOUS; SUBCUTANEOUS
Refills: 0 | Status: DISCONTINUED | OUTPATIENT
Start: 2022-09-18 | End: 2022-09-21

## 2022-09-18 RX ORDER — LANOLIN ALCOHOL/MO/W.PET/CERES
3 CREAM (GRAM) TOPICAL AT BEDTIME
Refills: 0 | Status: DISCONTINUED | OUTPATIENT
Start: 2022-09-18 | End: 2022-09-18

## 2022-09-18 RX ORDER — HYDROMORPHONE HYDROCHLORIDE 2 MG/ML
0.5 INJECTION INTRAMUSCULAR; INTRAVENOUS; SUBCUTANEOUS EVERY 4 HOURS
Refills: 0 | Status: DISCONTINUED | OUTPATIENT
Start: 2022-09-18 | End: 2022-09-18

## 2022-09-18 RX ORDER — ACETAMINOPHEN 500 MG
650 TABLET ORAL EVERY 6 HOURS
Refills: 0 | Status: DISCONTINUED | OUTPATIENT
Start: 2022-09-18 | End: 2022-09-18

## 2022-09-18 RX ORDER — HEPARIN SODIUM 5000 [USP'U]/ML
5000 INJECTION INTRAVENOUS; SUBCUTANEOUS EVERY 8 HOURS
Refills: 0 | Status: DISCONTINUED | OUTPATIENT
Start: 2022-09-18 | End: 2022-09-20

## 2022-09-18 RX ADMIN — SODIUM CHLORIDE 200 MILLILITER(S): 9 INJECTION, SOLUTION INTRAVENOUS at 01:55

## 2022-09-18 RX ADMIN — PIPERACILLIN AND TAZOBACTAM 25 GRAM(S): 4; .5 INJECTION, POWDER, LYOPHILIZED, FOR SOLUTION INTRAVENOUS at 18:28

## 2022-09-18 RX ADMIN — PIPERACILLIN AND TAZOBACTAM 200 GRAM(S): 4; .5 INJECTION, POWDER, LYOPHILIZED, FOR SOLUTION INTRAVENOUS at 13:31

## 2022-09-18 RX ADMIN — HEPARIN SODIUM 5000 UNIT(S): 5000 INJECTION INTRAVENOUS; SUBCUTANEOUS at 06:06

## 2022-09-18 RX ADMIN — SODIUM CHLORIDE 100 MILLILITER(S): 9 INJECTION INTRAMUSCULAR; INTRAVENOUS; SUBCUTANEOUS at 13:44

## 2022-09-18 RX ADMIN — Medication 100 MILLIGRAM(S): at 06:10

## 2022-09-18 RX ADMIN — HEPARIN SODIUM 5000 UNIT(S): 5000 INJECTION INTRAVENOUS; SUBCUTANEOUS at 15:25

## 2022-09-18 RX ADMIN — Medication 100 MILLIEQUIVALENT(S): at 01:52

## 2022-09-18 RX ADMIN — PANTOPRAZOLE SODIUM 40 MILLIGRAM(S): 20 TABLET, DELAYED RELEASE ORAL at 13:31

## 2022-09-18 NOTE — H&P ADULT - PROBLEM SELECTOR PLAN 1
- CT abd/pelvis = Mild acute interstitial edematous pancreatitis involving the pancreatic body and tail. Gallstones. The gallbladder is moderately distended with mild wall thickening and pericholecystic edema.  - T bili 3.2; ; AST 1665;   - Lipase 2834, TAG wnl  - Echo 5/22/22 = Hyperdynamic left ventricular systolic function (EF>70%). G1DD, mild pulm HTN  - Levofloxacin+Flagyl  - Surgery consulted = Strict NPO, IVF, Abx - CT abd/pelvis = Mild acute interstitial edematous pancreatitis involving the pancreatic body and tail. Gallstones. The gallbladder is moderately distended with mild wall thickening and pericholecystic edema.  - T bili 3.2; ; AST 1665;   - Lipase 2834, TAG wnl  - IVF (Echo 5/22/22 = Hyperdynamic left ventricular systolic function (EF>70%). G1DD, mild pulm HTN)  - Levofloxacin + Flagyl  - Surgery consulted = Strict NPO, IVF, pain control, Abx - T bili 3.2; ; AST 1665;   - Lipase 2834, TAG wnl, denies abdominal pain  - CT abd/pelvis = Mild acute interstitial edematous pancreatitis involving the pancreatic body and tail. Gallstones. The gallbladder is moderately distended with mild wall thickening and pericholecystic edema.  - US RUQ = Hepatic steatosis, Cholelithiasis without evidence of acute cholecystitis.  - IVF (Echo 5/22/22 = Hyperdynamic left ventricular systolic function (EF>70%). G1DD, mild pulm HTN)  - Levofloxacin + Flagyl  - Surgery consulted = Strict NPO, IVF, pain control, Abx

## 2022-09-18 NOTE — H&P ADULT - ASSESSMENT
Patient is a 77F, ambulatory with wheelchair, baseline AAOx2~3, with PMHx of hypothyroidism, dementia, and sarcoidosis, who comes in with 1 day history of altered mental status. Admitted for gallstone pancreatitis.

## 2022-09-18 NOTE — CONSULT NOTE ADULT - RESPIRATORY
normal/clear to auscultation bilaterally/no wheezes/no rales/no rhonchi
clear to auscultation bilaterally/no wheezes/no rales/no rhonchi/no respiratory distress/no use of accessory muscles/breath sounds equal/good air movement

## 2022-09-18 NOTE — H&P ADULT - PROBLEM SELECTOR PLAN 3
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

## 2022-09-18 NOTE — CONSULT NOTE ADULT - ASSESSMENT
78y/o f mostly bed ridden per daughter Dariana Doe [phone], baseline AAOx2, with PMH of hypothyroidism, dementia, and sarcoidosis, who comes in with 1 day history of new worsening altered mental status, anorexia , foul smelling urine and generalized weakness. Tmax-100.3F, significant leucocytosis , hypokalemic , markedly elevated LFTs and lipase >2800. US shows gallstones and CT shows mild interstitial edematous pancreas. Pt has gallstone pancreatitis      Keep NPO with Copious hydration  IV ABx   Pain/Nausea mgmt prn  Hypokalemia-Replete potassium , rpt labs inc and monitor   OR for lap possible open cholecystectomy planned when pancreatitis resolves   Other care/mgmt per primary team.  Surgery will follow /update  
1.  Abdominal Pain  2. Gall stone pancreatitis  3. R/o cholecystitis  4. R/o CBD stone  5. Colitis  6. R/o colorectal neoplasm    Suggestions:    1. NPO  2. IVF hydration  3. Protonix daily  4. MRCP  5. Surgical follow up  6. Antibiotics IV  7. Check CEA  8. Colonoscopy  9. Follow up LFT's  10. DVT prophylaxis 
Bacteremia - with E. Coli, the possible 3 sources include , Acute Cholecystitis , Colitis or UTI  Acute Cholecystitis appears to be the most likely source  Leukocytosis      Plan - Cont Zosyn 3.375 mgs iv q8hrs for now  repeat blood cultures in 24- 48 hrs  awaiting MRCP   might need a HIDA scan.  awaiting urine culture results.

## 2022-09-18 NOTE — PATIENT PROFILE ADULT - FALL HARM RISK - HARM RISK INTERVENTIONS

## 2022-09-18 NOTE — CONSULT NOTE ADULT - NEGATIVE OPHTHALMOLOGIC SYMPTOMS
no diplopia/no photophobia/no lacrimation L/no lacrimation R/no blurred vision L/no irritation L/no irritation R/no loss of vision L/no loss of vision R

## 2022-09-18 NOTE — PROGRESS NOTE ADULT - SUBJECTIVE AND OBJECTIVE BOX
Pt s- new complaints  ICU Vital Signs Last 24 Hrs  T(C): 36.9 (18 Sep 2022 07:20), Max: 37.9 (17 Sep 2022 16:21)  T(F): 98.4 (18 Sep 2022 07:20), Max: 100.3 (17 Sep 2022 16:21)  HR: 63 (18 Sep 2022 07:20) (63 - 76)  BP: 107/68 (18 Sep 2022 07:20) (107/68 - 124/73)  BP(mean): --  ABP: --  ABP(mean): --  RR: 18 (18 Sep 2022 07:20) (18 - 19)  SpO2: 98% (18 Sep 2022 07:20) (96% - 99%)    O2 Parameters below as of 18 Sep 2022 07:20  Patient On (Oxygen Delivery Method): room air        Alert nad  Abd: soft nt nd                        12.2   16.08 )-----------( 330      ( 18 Sep 2022 05:20 )             38.7   09-18    139  |  105  |  11  ----------------------------<  99  3.6   |  26  |  0.67    Ca    7.8<L>      18 Sep 2022 05:20  Phos  2.1     09-18  Mg     1.7     09-18    TPro  6.3  /  Alb  1.9<L>  /  TBili  3.1<H>  /  DBili  x   /  AST  862<H>  /  ALT  396<H>  /  AlkPhos  322<H>  09-18

## 2022-09-18 NOTE — H&P ADULT - PROBLEM SELECTOR PLAN 6
- Home med Levothyroxine 88mcg orally  - Due to NPO status, will continue as IV (88 x 0.75 = 66)  - F/u TSH

## 2022-09-18 NOTE — CONSULT NOTE ADULT - SUBJECTIVE AND OBJECTIVE BOX
GENERAL SURGERY CONSULT NOTE     chief complaint of Anorexia/AMS  (18 Sep 2022 01:16)      HPI:  76y/o f mostly bed ridden per daughter Dariana Doe [phone], baseline AAOx2, with PMH of hypothyroidism, dementia, and sarcoidosis, who comes in with 1 day history of new worsening altered mental status. Unable to comprehend anything, more confused today, no appetite, foul smelling urine and generalized weakness. Pt is incoherent unable to answer questions. No coughing, vomiting witnessed.  Pt lives with granddaughter & grandson. No other complaints at this time.     PAST MEDICAL & SURGICAL HISTORY:  Dementia  Hypothyroidism  Sarcoidosis      No significant past surgical history        MEDICATIONS  (STANDING):  heparin   Injectable 5000 Unit(s) SubCutaneous every 8 hours  lactated ringers. 1000 milliLiter(s) (200 mL/Hr) IV Continuous <Continuous>  levoFLOXacin IVPB 750 milliGRAM(s) IV Intermittent every 24 hours  metroNIDAZOLE  IVPB 500 milliGRAM(s) IV Intermittent every 8 hours  potassium chloride  10 mEq/100 mL IVPB 10 milliEquivalent(s) IV Intermittent every 1 hour    MEDICATIONS  (PRN):  HYDROmorphone  Injectable 0.5 milliGRAM(s) IV Push every 4 hours PRN Moderate Pain (4 - 6)  ondansetron Injectable 4 milliGRAM(s) IV Push every 6 hours PRN Nausea and/or Vomiting      Allergies    No Known Allergies    Intolerances        Social History:      FAMILY HISTORY:        Physical Exam:  Vital Signs Last 24 Hrs  T(C): 36.6 (18 Sep 2022 00:57), Max: 37.9 (17 Sep 2022 16:21)  T(F): 97.9 (18 Sep 2022 00:57), Max: 100.3 (17 Sep 2022 16:21)  HR: 64 (18 Sep 2022 00:57) (64 - 76)  BP: 115/67 (18 Sep 2022 00:57) (115/67 - 117/76)  BP(mean): --  RR: 18 (18 Sep 2022 00:57) (18 - 19)  SpO2: 99% (18 Sep 2022 00:57) (96% - 99%)    Parameters below as of 18 Sep 2022 00:57  Patient On (Oxygen Delivery Method): room air        General:  A&Ox3,Appears stated age, No acute distress,  Head: NC/AT  EENT: PERRLA. EOMI. Conjunctiva and sclera clear. Pharynx clear.  Neck: Supple. No JVD  Lungs: CTA B/l. Nonlabored Respirations  CV: +S1S2, RRR  Abdomen: Soft, Nondistended, +RUQ tenderness, no xavier sign , no guarding, no rebound  Extremities: Warm and well perfused. 2+ peripheral pulses b/l. Calf soft, nontender b/l. No pedal edema.        LABS:                        13.2   23.71 )-----------( 440      ( 17 Sep 2022 16:50 )             41.1         140  |  101  |  10  ----------------------------<  123<H>  2.6<LL>   |  27  |  1.01    Ca    8.5      17 Sep 2022 16:50  Mg     1.9         TPro  7.6  /  Alb  2.3<L>  /  TBili  3.2<H>  /  DBili  x   /  AST  1665<H>  /  ALT  464<H>  /  AlkPhos  408<H>      LIVER FUNCTIONS - ( 17 Sep 2022 16:50 )  Alb: 2.3 g/dL / Pro: 7.6 g/dL / ALK PHOS: 408 U/L / ALT: 464 U/L DA / AST: 1665 U/L / GGT: x             Urinalysis Basic - ( 17 Sep 2022 17:40 )    Color: Shavon / Appearance: Slightly Turbid / S.015 / pH: x  Gluc: x / Ketone: Small  / Bili: Large / Urobili: 12   Blood: x / Protein: 100 / Nitrite: Negative   Leuk Esterase: Small / RBC: 5-10 /HPF / WBC 6-10 /HPF   Sq Epi: x / Non Sq Epi: Many /HPF / Bacteria: Moderate /HPF        RADIOLOGY & ADDITIONAL STUDIES:  < from: US Hepatic & Pancreatic (22 @ 18:56) >  FINDINGS:    Limited evaluation due to patient immobility and artifact from abundant   bowel gas.    Liver: Steatosis. Liver size is normal, measuring 17 cm. No focal   lesions. Limited Doppler evaluation demonstrates patency of the portal   and hepatic vein vasculature.    Intrahepatic ducts: Not dilated.    Common bile duct: Normal diameter, measuring 0.3 cm.    Gallbladder: Nondilated. Few gallstones. No wall thickening. Trace   pericholecystic fluid.   Negative sonographic Xavier's sign.    Pancreas: The visualized portions are normal in appearance.    Abdominal aorta: The visualized portions are normal in appearance.    Inferior vena cava: The visualized portions are normal in appearance.    Right kidney: No hydronephrosis. Normal echogenicity. No focal lesions.   Length of 9.9 cm.      IMPRESSION:  1.  Hepatic steatosis.  2.  Cholelithiasis without evidence of acute cholecystitis.      < end of copied text >    < from: CT Abdomen and Pelvis w/ IV Cont (22 @ 22:40) >  FINDINGS:  LOWER CHEST: The heart is enlarged.    LIVER: Within normal limits.  BILE DUCTS: Normal caliber.  GALLBLADDER: Moderately distended with a small gallstone. There is mild   gallbladder wall thickening and pericholecystic fluid.  SPLEEN: Within normal limits.  PANCREAS: The body and tail of the pancreas appear indistinct with mild   peripancreatic fat stranding suggesting pancreatitis.  ADRENALS: Within normal limits.  KIDNEYS/URETERS: Within normal limits.    BLADDER: Incompletely distended with wall thickening and mucosal   hyperemia raising the possibility of cystitis.  REPRODUCTIVE ORGANS: Hysterectomy.    BOWEL: Small hiatal hernia. No bowel obstruction. Appendix unremarkable.   The right colon appears thickened with hyperemia suggesting colitis.   Scattered diffuse colonic diverticulosis.  PERITONEUM: No ascites.  VESSELS: Within normal limits.  RETROPERITONEUM/LYMPH NODES: No lymphadenopathy.  ABDOMINAL WALL: Fat-containing umbilical hernia.  BONES: Within normal limits.    IMPRESSION:    Mild acute interstitial edematous pancreatitis involving the pancreatic   body and tail. No focal collection, venous thrombosis, or pseudoaneurysm.  Gallstones. The gallbladder is moderately distended with mild wall   thickening and pericholecystic edema. If there is clinical concern for   acute cholecystitis, ultrasound or nuclear HIDA scan may be obtained.    Right colonic thickening may represent colitis. Correlate with recent   colonoscopy to exclude underlying neoplasm. Diffusediverticulosis.    Possible cystitis. Correlate with urinalysis.      < end of copied text >

## 2022-09-18 NOTE — PROGRESS NOTE ADULT - ASSESSMENT
GS pancreatitis  clinically improved since admision  GI follow up  trend labs  consider for lap daija when pancreatitis resolves

## 2022-09-18 NOTE — CHART NOTE - NSCHARTNOTEFT_GEN_A_CORE
Patient is a 77y old  Female who presents with a chief complaint of Pancreatitis (18 Sep 2022 17:10)    Patient was seen and examined in ER  AAOx2, denies any complains, unsure why she is in hospital but reports she is forgetting a lot this days and falling a lot     INTERVAL HPI/OVERNIGHT EVENTS:  T(C): 36.6 (22 @ 15:30), Max: 36.9 (22 @ 07:20)  HR: 62 (22 @ 15:30) (62 - 67)  BP: 115/68 (22 @ 15:30) (107/68 - 124/73)  RR: 17 (22 @ 15:30) (17 - 18)  SpO2: 97% (22 @ 15:30) (97% - 99%)  Wt(kg): --  I&O's Summary      REVIEW OF SYSTEMS: denies fever, chills, SOB, palpitations, chest pain, abdominal pain, nausea, vomiting, diarrhea, constipation, dizziness    MEDICATIONS  (STANDING):  heparin   Injectable 5000 Unit(s) SubCutaneous every 8 hours  levothyroxine Injectable 66 MICROGram(s) IV Push at bedtime  pantoprazole  Injectable 40 milliGRAM(s) IV Push daily  sodium chloride 0.9%. 1000 milliLiter(s) (100 mL/Hr) IV Continuous <Continuous>    MEDICATIONS  (PRN):  ondansetron Injectable 4 milliGRAM(s) IV Push every 6 hours PRN Nausea and/or Vomiting      PHYSICAL EXAM:  GENERAL: NAD, well-groomed, well-developed  HEAD:  Atraumatic, Normocephalic  NERVOUS SYSTEM:  Alert & Oriented X1, no focal deficit   CHEST/LUNG: Clear to auscultation bilaterally; No rales, rhonchi, wheezing, or rubs  HEART: Regular rate and rhythm; No murmurs, rubs, or gallops  ABDOMEN: Soft, Nontender on my exam, Nondistended; Bowel sounds present  EXTREMITIES:  2+ Peripheral Pulses, No clubbing, cyanosis, or edema  SKIN: No rashes or lesions    LABS:                        12.2   16.08 )-----------( 330      ( 18 Sep 2022 05:20 )             38.7     -    139  |  105  |  11  ----------------------------<  99  3.6   |  26  |  0.67    Ca    7.8<L>      18 Sep 2022 05:20  Phos  2.1       Mg     1.7         TPro  6.3  /  Alb  1.9<L>  /  TBili  3.1<H>  /  DBili  x   /  AST  862<H>  /  ALT  396<H>  /  AlkPhos  322<H>        Urinalysis Basic - ( 17 Sep 2022 17:40 )    Color: Shavon / Appearance: Slightly Turbid / S.015 / pH: x  Gluc: x / Ketone: Small  / Bili: Large / Urobili: 12   Blood: x / Protein: 100 / Nitrite: Negative   Leuk Esterase: Small / RBC: 5-10 /HPF / WBC 6-10 /HPF   Sq Epi: x / Non Sq Epi: Many /HPF / Bacteria: Moderate /HPF      CAPILLARY BLOOD GLUCOSE        A/P:  E. Coli bacteremia probably from intra abdominal source   Acute gallstone pancreatitis   Transaminitis   Dementia   Hypothyroidism     plan:  Agree with Chinedu   Will obtain MRCP  Surgery consult appreciated   NPO for now  IVF  IV PPI   Trend LFTs  Monitor and replete electrolytes   Plan of care was discussed with NP Mitra

## 2022-09-18 NOTE — H&P ADULT - NSHPREVIEWOFSYSTEMS_GEN_ALL_CORE
CONSTITUTIONAL: No fever, weight loss, or fatigue  EYES: No eye pain, visual disturbances, or discharge  ENT:  No difficulty hearing, tinnitus, vertigo; No sinus or throat pain  NECK: No pain or stiffness  RESPIRATORY: No cough, wheezing, chills or hemoptysis; No Shortness of Breath  CARDIOVASCULAR: No chest pain, palpitations, passing out, dizziness, or leg swelling  GASTROINTESTINAL: No abdominal or epigastric pain. No nausea, vomiting, or hematemesis; No diarrhea or constipation. No melena or hematochezia.  GENITOURINARY: No dysuria, frequency, hematuria, or incontinence  NEUROLOGICAL: No headaches, memory loss, loss of strength, numbness, or tremors  SKIN: No itching, burning, rashes, or lesions   LYMPH Nodes: No enlarged glands  ENDOCRINE: No heat or cold intolerance; No hair loss  MUSCULOSKELETAL: No joint pain or swelling; No muscle, back, No extremity pain  PSYCHIATRIC: No depression, anxiety, mood swings, or difficulty sleeping  HEME/LYMPH: No easy bruising, or bleeding gums  ALLERGY AND IMMUNOLOGIC: No hives or eczema

## 2022-09-18 NOTE — PATIENT PROFILE ADULT - STATED REASON FOR ADMISSION
Family called EMS due to confusion: not chewing her food and not not recognizing family members. Urine dark and foul smelling.

## 2022-09-18 NOTE — H&P ADULT - HISTORY OF PRESENT ILLNESS
Patient is a 77F, ambulatory with wheelchair, baseline AAOx2~3, with PMHx of hypothyroidism, dementia, and sarcoidosis, who comes in with 1 day history of altered mental status.  Patient is a 77F, ambulatory with wheelchair, baseline AAOx2~3, lives with granddaughter&grandson, with PMHx of hypothyroidism, dementia, and sarcoidosis, who comes in with 1 day history of altered mental status. According to the daughter Dariana Doe [phone], patient could not comprehend anything, more confused today without any appetite.   At the time of the interview, patient is AAOx2 and altered mental status is resolved. She denies any symptoms including headache, SOB, chest pain, abdominal pain, dysuria, hematuria, or bloody/dark stools. Patient is a 77F, ambulatory with wheelchair, baseline AAOx2~3, lives with granddaughter&grandson, with PMHx of hypothyroidism, dementia, and sarcoidosis, who comes in with 1 day history of altered mental status. According to the daughter Dariana Doe [phone], patient could not comprehend anything, more confused today without any appetite. At the time of the interview, patient is AAOx2 and altered mental status is resolved. She denies any symptoms including headache, SOB, chest pain, abdominal pain, dysuria, hematuria, or bloody/dark stools.

## 2022-09-18 NOTE — H&P ADULT - PROBLEM SELECTOR PLAN 2
- Hx of dementia, AAOx2~3 at baseline  - Maybe secondary to underlying infection  - WBC 23.7, UA positive  - F/u UCx, BCx  - Levofloxacin+Flagyl - Hx of dementia, AAOx2~3 at baseline  - Maybe secondary to underlying infection  - WBC 23.7, UA positive  - F/u UCx, BCx  - Levofloxacin + Flagyl - Hx of dementia, AAOx2~3 at baseline  - Maybe secondary to underlying infection  - WBC 23.7, UA positive  - F/u UCx, BCx  - Levofloxacin + Flagyl  - Resolved

## 2022-09-18 NOTE — H&P ADULT - ATTENDING COMMENTS
Discussed with [facility] PGY2 Dr. Linn.    This is a 76 y/o female with complex medical history presenting with altered mental status. Limited history can be obtained from patient given baseline mental status deficits. Lipase noted to be >2000 with markedly elevated LFTs. CT consistent with acute pancreatitis and suspected acute cholecystitis. Will admit to medicine for acute pancreatitis likely 2/2 to gallstones and acute cholecystitis for which surgery is consulted.    Vital Signs Last 24 Hrs  T(C): 36.6 (18 Sep 2022 04:58), Max: 37.9 (17 Sep 2022 16:21)  T(F): 97.9 (18 Sep 2022 04:58), Max: 100.3 (17 Sep 2022 16:21)  HR: 65 (18 Sep 2022 04:58) (64 - 76)  BP: 124/73 (18 Sep 2022 04:58) (115/67 - 124/73)  BP(mean): --  RR: 18 (18 Sep 2022 04:58) (18 - 19)  SpO2: 98% (18 Sep 2022 04:58) (96% - 99%)    Parameters below as of 18 Sep 2022 04:58  Patient On (Oxygen Delivery Method): room air    PEx  as above    #Toxic metabolic encephalopathy  #Acute pancreatitis, likely 2/2 to gallstones  #Acute cholecystitis  #Transaminitis  #UTI  #Hypokalemia  #Sarcoidosis  #Hypothyroidism  #Dementia  #Hepatic steatosis  #Prophylactic measure    - admit to medicine  - IV hydration  - surgery consult   - NPO  - IV antibiotics  - f/u blood and urine cx  - replete electrolytes  - trend LFTs

## 2022-09-18 NOTE — PROVIDER CONTACT NOTE (CRITICAL VALUE NOTIFICATION) - TEST AND RESULT REPORTED:
Blood culture (gram negative rods)
Blood cultures obtained 9/17 gram stain growth anerobic bottle gram negative rods

## 2022-09-18 NOTE — H&P ADULT - PROBLEM SELECTOR PLAN 4
- CT abd/pelvis = Right colonic thickening may represent colitis.  - - CT abd/pelvis = Right colonic thickening may represent colitis.  - Levofloxacin + Flagyl

## 2022-09-18 NOTE — CONSULT NOTE ADULT - GASTROINTESTINAL
details… soft/nondistended/no guarding/no rigidity/no organomegaly/no palpable pelon/no masses palpable/tender

## 2022-09-18 NOTE — H&P ADULT - NSHPPHYSICALEXAM_GEN_ALL_CORE
GENERAL: NAD, well-groomed, well-developed  HEAD:  Atraumatic, Normocephalic  EYES: EOMI, PERRLA, conjunctiva and sclera clear  ENMT: No tonsillar erythema, exudates, or enlargement; Moist mucous membranes, Good dentition, No lesions  NECK: Supple, normal appearance, No JVD; Normal thyroid; Trachea midline  NERVOUS SYSTEM:  Alert & Oriented X3,  Motor Strength 5/5 B/L upper and lower extremities, sensation intact  CHEST/LUNG: Lungs clear to auscultation bilaterally, No rales, rhonchi, wheezing   HEART: Regular rate and rhythm; No murmurs, rubs, or gallops  ABDOMEN: Soft, Nontender, Nondistended; Bowel sounds present  EXTREMITIES:  2+ Peripheral Pulses, No clubbing, cyanosis, or edema  LYMPH: No lymphadenopathy noted  SKIN: No rashes or lesions;  Good capillary refill GENERAL: NAD  HEAD:  Atraumatic, Normocephalic  EYES: EOMI, PERRLA, conjunctiva and sclera clear  ENMT: No tonsillar erythema, exudates, or enlargement; Moist mucous membranes, Good dentition, No lesions  NECK: Supple, normal appearance, No JVD; Normal thyroid; Trachea midline  NERVOUS SYSTEM: Alert & Oriented X2, Motor Strength 1/5 B/L lower extremities. 3/5 BL upper extremities, sensation intact  CHEST/LUNG: Lungs clear to auscultation bilaterally, No rales, rhonchi, wheezing   HEART: Regular rate and rhythm; No murmurs, rubs, or gallops  ABDOMEN: Soft, Nontender, Nondistended; Bowel sounds present  EXTREMITIES: 2+ Peripheral Pulses, No clubbing, cyanosis, or edema  LYMPH: No lymphadenopathy noted  SKIN: No rashes or lesions;  Good capillary refill

## 2022-09-18 NOTE — PATIENT PROFILE ADULT - FUNCTIONAL ASSESSMENT - BASIC MOBILITY 6.
1-calculated by average/Not able to assess (calculate score using [facility] averaging method)

## 2022-09-18 NOTE — CONSULT NOTE ADULT - NEGATIVE GASTROINTESTINAL SYMPTOMS
no nausea/no vomiting/no diarrhea/no melena/no hematochezia/no steatorrhea/no jaundice/no hiccoughs
no nausea/no vomiting/no diarrhea/no abdominal pain

## 2022-09-18 NOTE — CONSULT NOTE ADULT - SUBJECTIVE AND OBJECTIVE BOX
[  ] STAT REQUEST              [ X ] ROUTINE REQUEST    Patient is a 77 year old female with abdominal pain. GI consulted to evaluate.       HPI:  Patient is a 77 year old female, ambulatory with wheelchair, baseline AAOx2~3, lives with granddaughter & grandson, with past medical history significant for hypothyroidism, dementia, and sarcoidosis, presented with altered mental status. Patient c/o 2 days history of intermittent, sharp, 4/10 intensity epigastric abdominal pain radiating to her back associated with nausea. Patient denies hematemesis, hematochezia, melena, fever, chills, chest pain, SOB, cough, hematuria, dysuria or diarrhea.        PAIN MANAGEMENT:  Pain Scale:                4 /10  Pain Location:  Epigastric abdominal pain      Prior Colonoscopy:  No prior colonoscopy      PAST MEDICAL HISTORY  Dementia  Hypothyroidism  Sarcoidosis        PAST SURGICAL HISTORY  No significant past surgical history        Allergies    No Known Allergies    Intolerances  None       MEDICATIONS  (STANDING):  heparin   Injectable 5000 Unit(s) SubCutaneous every 8 hours  lactated ringers. 1000 milliLiter(s) (100 mL/Hr) IV Continuous <Continuous>  levothyroxine Injectable 66 MICROGram(s) IV Push at bedtime  pantoprazole  Injectable 40 milliGRAM(s) IV Push daily  piperacillin/tazobactam IVPB. 3.375 Gram(s) IV Intermittent once  piperacillin/tazobactam IVPB.- 3.375 Gram(s) IV Intermittent once    MEDICATIONS  (PRN):  ondansetron Injectable 4 milliGRAM(s) IV Push every 6 hours PRN Nausea and/or Vomiting      SOCIAL HISTORY  Advanced Directives:       [ X ] Full Code       [  ] DNR  Marital Status:         [  ] M      [ X ] S      [  ] D       [  ] W  Children:       [ X ] Yes      [  ] No  Occupation:        [  ] Employed       [ X ] Unemployed       [  ] Retired  Diet:       [ X ] Regular       [  ] PEG feeding          [  ] NG tube feeding  Drug Use:           [  ] Patient denied          [  ] Yes  Alcohol:           [ X ] No             [  ] Yes (socially)         [  ] Yes (chronic)  Tobacco:           [  ] Yes           [ X ] No      FAMILY HISTORY  [ X ] Heart Disease            [ X ] Diabetes             [X  ] HTN             [  ] Colon Cancer             [  ] Stomach Cancer              [  ] Pancreatic Cancer      VITAL SIGNS   Vital Signs Last 24 Hrs  T(C): 36.5 (18 Sep 2022 11:20), Max: 37.9 (17 Sep 2022 16:21)  T(F): 97.7 (18 Sep 2022 11:20), Max: 100.3 (17 Sep 2022 16:21)  HR: 67 (18 Sep 2022 11:20) (63 - 76)  BP: 116/70 (18 Sep 2022 11:20) (107/68 - 124/73)   RR: 17 (18 Sep 2022 11:20) (17 - 19)  SpO2: 97% (18 Sep 2022 11:20) (96% - 99%)  Parameters below as of 18 Sep 2022 11:20  Patient On (Oxygen Delivery Method): room air  Daily Height in cm: 157.48 (17 Sep 2022 15:38)         CBC Full  -  ( 18 Sep 2022 05:20 )  WBC Count : 16.08 K/uL  RBC Count : 4.15 M/uL  Hemoglobin : 12.2 g/dL  Hematocrit : 38.7 %  Platelet Count - Automated : 330 K/uL  Mean Cell Volume : 93.3 fl  Mean Cell Hemoglobin : 29.4 pg  Mean Cell Hemoglobin Concentration : 31.5 gm/dL  Auto Neutrophil # : x  Auto Lymphocyte # : x  Auto Monocyte # : x  Auto Eosinophil # : x  Auto Basophil # : x  Auto Neutrophil % : x  Auto Lymphocyte % : x  Auto Monocyte % : x  Auto Eosinophil % : x  Auto Basophil % : x      09-18    139  |  105  |  11  ----------------------------<  99  3.6   |  26  |  0.67    Ca    7.8<L>      18 Sep 2022 05:20  Phos  2.1     09-18  Mg     1.7     09-18    TPro  6.3  /  Alb  1.9<L>  /  TBili  3.1<H>  /  DBili  x   /  AST  862<H>  /  ALT  396<H>  /  AlkPhos  322<H>  09-18      Lipase, Serum: 567 U/L (09-18 @ 02:12)  Lipase, Serum: 2834 U/L (09-17 @ 16:50)        Urinalysis (09.17.22 @ 17:40)   pH Urine: 5.0   Glucose Qualitative, Urine: Negative   Blood, Urine: Moderate   Color: Shavon   Urine Appearance: Slightly Turbid   Bilirubin: Large   Ketone - Urine: Small   Specific Gravity: 1.015   Protein, Urine: 100   Urobilinogen: 12   Nitrite: Negative   Leukocyte Esterase Concentration: Small < from: 12 Lead ECG (05.21.22 @ 18:40) >  Ventricular Rate 71 BPM    Atrial Rate 71 BPM    P-R Interval 138 ms    QRS Duration 90 ms    Q-T Interval 440 ms    QTC Calculation(Bazett) 478 ms    P Axis 36 degrees    R Axis -24 degrees    T Axis -44 degrees    Diagnosis Line *** Poor data quality, interpretation may be adversely affected  Normal sinus rhythm  Voltage criteria for left ventricular hypertrophy  ST & T wave abnormality, consider inferior ischemia  ST & T wave abnormality, consider anterolateral ischemia  Abnormal ECG    < end of copied text >        RADIOLOGY/IMAGING                  ACC: [id number] EXAM:  CT ABDOMEN AND PELVIS IC                          PROCEDURE DATE:  09/17/2022          INTERPRETATION:  CLINICAL INFORMATION: Abdominal pain, history of acute   pancreatitis    COMPARISON: None.    CONTRAST/COMPLICATIONS:  IV Contrast: Omnipaque 350  90 cc administered   10 cc discarded  Oral Contrast: NONE  Complications: None reported at time of study completion    PROCEDURE:  CT of the Abdomen and Pelvis was performed.  Sagittal and coronal reformats were performed.    FINDINGS:  LOWER CHEST: The heart is enlarged.    LIVER: Within normal limits.  BILE DUCTS: Normal caliber.  GALLBLADDER: Moderately distended with a small gallstone. There is mild   gallbladder wall thickening and pericholecystic fluid.  SPLEEN: Within normal limits.  PANCREAS: The body and tail of the pancreas appear indistinct with mild   peripancreatic fat stranding suggesting pancreatitis.  ADRENALS: Within normal limits.  KIDNEYS/URETERS: Within normal limits.    BLADDER: Incompletely distended with wall thickening and mucosal   hyperemia raising the possibility of cystitis.  REPRODUCTIVE ORGANS: Hysterectomy.    BOWEL: Small hiatal hernia. No bowel obstruction. Appendix unremarkable.   The right colon appears thickened with hyperemia suggesting colitis.   Scattered diffuse colonic diverticulosis.  PERITONEUM: No ascites.  VESSELS: Within normal limits.  RETROPERITONEUM/LYMPH NODES: No lymphadenopathy.  ABDOMINAL WALL: Fat-containing umbilical hernia.  BONES: Within normal limits.    IMPRESSION:    Mild acute interstitial edematous pancreatitis involving the pancreatic   body and tail. No focal collection, venous thrombosis, or pseudoaneurysm.  Gallstones. The gallbladder is moderately distended with mild wall   thickening and pericholecystic edema. If there is clinical concern for   acute cholecystitis, ultrasound or nuclear HIDA scan may be obtained.    Right colonic thickening may represent colitis. Correlate with recent   colonoscopy to exclude underlying neoplasm. Diffusediverticulosis.    Possible cystitis. Correlate with urinalysis.          ACC: [id number] EXAM:  US LIVER AND PANCREAS                          PROCEDURE DATE:  09/17/2022          INTERPRETATION:  CLINICAL INFORMATION: Sepsis. Fever. Elevated liver   enzymes.    COMPARISON: None available.    TECHNIQUE: Limited grayscale and color ultrasound of the abdomen was   performed.    FINDINGS:    Limited evaluation due to patient immobility and artifact from abundant   bowel gas.    Liver: Steatosis. Liver size is normal, measuring 17 cm. No focal   lesions. Limited Doppler evaluation demonstrates patency of the portal   and hepatic vein vasculature.    Intrahepatic ducts: Not dilated.    Common bile duct: Normal diameter, measuring 0.3 cm.    Gallbladder: Nondilated. Few gallstones. No wall thickening. Trace   pericholecystic fluid.   Negative sonographic Xavier's sign.    Pancreas: The visualized portions are normal in appearance.    Abdominal aorta: The visualized portions are normal in appearance.    Inferior vena cava: The visualized portions are normal in appearance.    Right kidney: No hydronephrosis. Normal echogenicity. No focal lesions.   Length of 9.9 cm.      IMPRESSION:  1.  Hepatic steatosis.  2.  Cholelithiasis without evidence of acute cholecystitis.

## 2022-09-18 NOTE — H&P ADULT - PROBLEM SELECTOR PLAN 5
- P/w K 2.6 on admission  - Known to be frequently hypokalemic on admissions  - EKG = Sinus rhythm with 1st degree A-V block. Abnormal QRS-T angle, consider primary T wave abnormality  - Repleted  - F/u BMP

## 2022-09-18 NOTE — CONSULT NOTE ADULT - SUBJECTIVE AND OBJECTIVE BOX
HPI:  Patient is a 77F, ambulatory with wheelchair, baseline AAOx2~3, lives with granddaughter&grandson, with PMHx of hypothyroidism, dementia, and sarcoidosis, who comes in with 1 day history of altered mental status. According to the daughter Dariana Doe [phone], patient could not comprehend anything, more confused today without any appetite. At the time of the interview, patient is AAOx2 and altered mental status is resolved. She denies any symptoms including headache, SOB, chest pain, abdominal pain, dysuria, hematuria, or bloody/dark stools. (18 Sep 2022 01:16)      PAST MEDICAL & SURGICAL HISTORY:  Dementia      Hypothyroidism      Sarcoidosis      No significant past surgical history          No Known Allergies      Meds:  heparin   Injectable 5000 Unit(s) SubCutaneous every 8 hours  levothyroxine Injectable 66 MICROGram(s) IV Push at bedtime  ondansetron Injectable 4 milliGRAM(s) IV Push every 6 hours PRN  pantoprazole  Injectable 40 milliGRAM(s) IV Push daily  piperacillin/tazobactam IVPB.- 3.375 Gram(s) IV Intermittent once  sodium chloride 0.9%. 1000 milliLiter(s) IV Continuous <Continuous>      SOCIAL HISTORY:  Smoker:  YES / NO        PACK YEARS:                         WHEN QUIT?  ETOH use:  YES / NO               FREQUENCY / QUANTITY:  Ilicit Drug use:  YES / NO  Occupation:  Assisted device use (Cane / Walker):  Live with:    FAMILY HISTORY:      VITALS:  Vital Signs Last 24 Hrs  T(C): 36.6 (18 Sep 2022 15:30), Max: 36.9 (18 Sep 2022 07:20)  T(F): 97.9 (18 Sep 2022 15:30), Max: 98.4 (18 Sep 2022 07:20)  HR: 62 (18 Sep 2022 15:30) (62 - 67)  BP: 115/68 (18 Sep 2022 15:30) (107/68 - 124/73)  BP(mean): 84 (18 Sep 2022 15:30) (84 - 84)  RR: 17 (18 Sep 2022 15:30) (17 - 18)  SpO2: 97% (18 Sep 2022 15:30) (97% - 99%)    Parameters below as of 18 Sep 2022 15:30  Patient On (Oxygen Delivery Method): room air        LABS/DIAGNOSTIC TESTS:                          12.2   16.08 )-----------( 330      ( 18 Sep 2022 05:20 )             38.7     WBC Count: 16.08 K/uL ( @ 05:20)  WBC Count: 16.92 K/uL ( @ 02:12)  WBC Count: 23.71 K/uL ( @ 16:50)          139  |  105  |  11  ----------------------------<  99  3.6   |  26  |  0.67    Ca    7.8<L>      18 Sep 2022 05:20  Phos  2.1       Mg     1.7         TPro  6.3  /  Alb  1.9<L>  /  TBili  3.1<H>  /  DBili  x   /  AST  862<H>  /  ALT  396<H>  /  AlkPhos  322<H>        Urinalysis Basic - ( 17 Sep 2022 17:40 )    Color: Shavon / Appearance: Slightly Turbid / S.015 / pH: x  Gluc: x / Ketone: Small  / Bili: Large / Urobili: 12   Blood: x / Protein: 100 / Nitrite: Negative   Leuk Esterase: Small / RBC: 5-10 /HPF / WBC 6-10 /HPF   Sq Epi: x / Non Sq Epi: Many /HPF / Bacteria: Moderate /HPF        LIVER FUNCTIONS - ( 18 Sep 2022 05:20 )  Alb: 1.9 g/dL / Pro: 6.3 g/dL / ALK PHOS: 322 U/L / ALT: 396 U/L DA / AST: 862 U/L / GGT: x                 LACTATE:    ABG -     CULTURES:   .Blood Blood-Peripheral   @ 16:30   Growth in aerobic bottle: Gram Negative Rods          RADIOLOGY:< from: CT Abdomen and Pelvis w/ IV Cont (22 @ 22:40) >  ACC: [id number] EXAM:  CT ABDOMEN AND PELVIS IC                          PROCEDURE DATE:  2022          INTERPRETATION:  CLINICAL INFORMATION: Abdominal pain, history of acute   pancreatitis    COMPARISON: None.    CONTRAST/COMPLICATIONS:  IV Contrast: Omnipaque 350  90 cc administered   10 cc discarded  Oral Contrast: NONE  Complications: None reported at time of study completion    PROCEDURE:  CT of the Abdomen and Pelvis was performed.  Sagittal and coronal reformats were performed.    FINDINGS:  LOWER CHEST: The heart is enlarged.    LIVER: Within normal limits.  BILE DUCTS: Normal caliber.  GALLBLADDER: Moderately distended with a small gallstone. There is mild   gallbladder wall thickening and pericholecystic fluid.  SPLEEN: Within normal limits.  PANCREAS: The body and tail of the pancreas appear indistinct with mild   peripancreatic fat stranding suggesting pancreatitis.  ADRENALS: Within normal limits.  KIDNEYS/URETERS: Within normal limits.    BLADDER: Incompletely distended with wall thickening and mucosal   hyperemia raising the possibility of cystitis.  REPRODUCTIVE ORGANS: Hysterectomy.    BOWEL: Small hiatal hernia. No bowel obstruction. Appendix unremarkable.   The right colon appears thickened with hyperemia suggesting colitis.   Scattered diffuse colonic diverticulosis.  PERITONEUM: No ascites.  VESSELS: Within normal limits.  RETROPERITONEUM/LYMPH NODES: No lymphadenopathy.  ABDOMINAL WALL: Fat-containing umbilical hernia.  BONES: Within normal limits.    IMPRESSION:    Mild acute interstitial edematous pancreatitis involving the pancreatic   body and tail. No focal collection, venous thrombosis, or pseudoaneurysm.  Gallstones. The gallbladder is moderately distended with mild wall   thickening and pericholecystic edema. If there is clinical concern for   acute cholecystitis, ultrasound or nuclear HIDA scan may be obtained.    Right colonic thickening may represent colitis. Correlate with recent   colonoscopy to exclude underlying neoplasm. Diffusediverticulosis.    Possible cystitis. Correlate with urinalysis.        --- End of Report ---            CATHRYN HAMLIN MD; Attending Radiologist  This document has been electronically signed. Sep 17 2022 11:34PM    < end of copied text >  < from: US Hepatic & Pancreatic (22 @ 18:56) >  ACC: [id number] EXAM:  US LIVER AND PANCREAS                          PROCEDURE DATE:  2022          INTERPRETATION:  CLINICAL INFORMATION: Sepsis. Fever. Elevated liver   enzymes.    COMPARISON: None available.    TECHNIQUE: Limited grayscale and color ultrasound of the abdomen was   performed.    FINDINGS:    Limited evaluation due to patient immobility and artifact from abundant   bowel gas.    Liver: Steatosis. Liver size is normal, measuring 17 cm. No focal   lesions. Limited Doppler evaluation demonstrates patency of the portal   and hepatic vein vasculature.    Intrahepatic ducts: Not dilated.    Common bile duct: Normal diameter, measuring 0.3 cm.    Gallbladder: Nondilated. Few gallstones. No wall thickening. Trace   pericholecystic fluid.   Negative sonographic Xavier's sign.    Pancreas: The visualized portions are normal in appearance.    Abdominal aorta: The visualized portions are normal in appearance.    Inferior vena cava: The visualized portions are normal in appearance.    Right kidney: No hydronephrosis. Normal echogenicity. No focal lesions.   Length of 9.9 cm.      IMPRESSION:  1.  Hepatic steatosis.  2.  Cholelithiasis without evidence of acute cholecystitis.    --- End of Report ---            MAURA AGUSTIN MD; Attending Radiologist  This document has been electronically signed. Sep 17 2022  7:24PM    < end of copied text >        ROS  [  ] UNABLE TO ELICIT               HPI:  Patient is a 77F, ambulatory with wheelchair, baseline AAOx2~3, lives with granddaughter&grandson, with PMHx of hypothyroidism, dementia, and sarcoidosis, who comes in with 1 day history of altered mental status. According to the daughter Dariana Doe [phone], patient could not comprehend anything, more confused today without any appetite. At the time of the interview, patient is AAOx2 and altered mental status is resolved. She denies any symptoms including headache, SOB, chest pain, abdominal pain, dysuria, hematuria, or bloody/dark stools. (18 Sep 2022 01:16)        History as above, asked to see this patient who presented to the hospital         PAST MEDICAL & SURGICAL HISTORY:  Dementia      Hypothyroidism      Sarcoidosis      No significant past surgical history          No Known Allergies      Meds:  heparin   Injectable 5000 Unit(s) SubCutaneous every 8 hours  levothyroxine Injectable 66 MICROGram(s) IV Push at bedtime  ondansetron Injectable 4 milliGRAM(s) IV Push every 6 hours PRN  pantoprazole  Injectable 40 milliGRAM(s) IV Push daily  piperacillin/tazobactam IVPB.- 3.375 Gram(s) IV Intermittent once  sodium chloride 0.9%. 1000 milliLiter(s) IV Continuous <Continuous>      SOCIAL HISTORY:  Smoker:  YES / NO        PACK YEARS:                         WHEN QUIT?  ETOH use:  YES / NO               FREQUENCY / QUANTITY:  Ilicit Drug use:  YES / NO  Occupation:  Assisted device use (Cane / Walker):  Live with:    FAMILY HISTORY:      VITALS:  Vital Signs Last 24 Hrs  T(C): 36.6 (18 Sep 2022 15:30), Max: 36.9 (18 Sep 2022 07:20)  T(F): 97.9 (18 Sep 2022 15:30), Max: 98.4 (18 Sep 2022 07:20)  HR: 62 (18 Sep 2022 15:30) (62 - 67)  BP: 115/68 (18 Sep 2022 15:30) (107/68 - 124/73)  BP(mean): 84 (18 Sep 2022 15:30) (84 - 84)  RR: 17 (18 Sep 2022 15:30) (17 - 18)  SpO2: 97% (18 Sep 2022 15:30) (97% - 99%)    Parameters below as of 18 Sep 2022 15:30  Patient On (Oxygen Delivery Method): room air        LABS/DIAGNOSTIC TESTS:                          12.2   16.08 )-----------( 330      ( 18 Sep 2022 05:20 )             38.7     WBC Count: 16.08 K/uL ( @ 05:20)  WBC Count: 16.92 K/uL ( @ 02:12)  WBC Count: 23.71 K/uL ( @ 16:50)          139  |  105  |  11  ----------------------------<  99  3.6   |  26  |  0.67    Ca    7.8<L>      18 Sep 2022 05:20  Phos  2.1       Mg     1.7         TPro  6.3  /  Alb  1.9<L>  /  TBili  3.1<H>  /  DBili  x   /  AST  862<H>  /  ALT  396<H>  /  AlkPhos  322<H>        Urinalysis Basic - ( 17 Sep 2022 17:40 )    Color: Shavon / Appearance: Slightly Turbid / S.015 / pH: x  Gluc: x / Ketone: Small  / Bili: Large / Urobili: 12   Blood: x / Protein: 100 / Nitrite: Negative   Leuk Esterase: Small / RBC: 5-10 /HPF / WBC 6-10 /HPF   Sq Epi: x / Non Sq Epi: Many /HPF / Bacteria: Moderate /HPF        LIVER FUNCTIONS - ( 18 Sep 2022 05:20 )  Alb: 1.9 g/dL / Pro: 6.3 g/dL / ALK PHOS: 322 U/L / ALT: 396 U/L DA / AST: 862 U/L / GGT: x                 LACTATE:    ABG -     CULTURES:   .Blood Blood-Peripheral   @ 16:30   Growth in aerobic bottle: Gram Negative Rods          RADIOLOGY:< from: CT Abdomen and Pelvis w/ IV Cont (22 @ 22:40) >  ACC: [id number] EXAM:  CT ABDOMEN AND PELVIS IC                          PROCEDURE DATE:  2022          INTERPRETATION:  CLINICAL INFORMATION: Abdominal pain, history of acute   pancreatitis    COMPARISON: None.    CONTRAST/COMPLICATIONS:  IV Contrast: Omnipaque 350  90 cc administered   10 cc discarded  Oral Contrast: NONE  Complications: None reported at time of study completion    PROCEDURE:  CT of the Abdomen and Pelvis was performed.  Sagittal and coronal reformats were performed.    FINDINGS:  LOWER CHEST: The heart is enlarged.    LIVER: Within normal limits.  BILE DUCTS: Normal caliber.  GALLBLADDER: Moderately distended with a small gallstone. There is mild   gallbladder wall thickening and pericholecystic fluid.  SPLEEN: Within normal limits.  PANCREAS: The body and tail of the pancreas appear indistinct with mild   peripancreatic fat stranding suggesting pancreatitis.  ADRENALS: Within normal limits.  KIDNEYS/URETERS: Within normal limits.    BLADDER: Incompletely distended with wall thickening and mucosal   hyperemia raising the possibility of cystitis.  REPRODUCTIVE ORGANS: Hysterectomy.    BOWEL: Small hiatal hernia. No bowel obstruction. Appendix unremarkable.   The right colon appears thickened with hyperemia suggesting colitis.   Scattered diffuse colonic diverticulosis.  PERITONEUM: No ascites.  VESSELS: Within normal limits.  RETROPERITONEUM/LYMPH NODES: No lymphadenopathy.  ABDOMINAL WALL: Fat-containing umbilical hernia.  BONES: Within normal limits.    IMPRESSION:    Mild acute interstitial edematous pancreatitis involving the pancreatic   body and tail. No focal collection, venous thrombosis, or pseudoaneurysm.  Gallstones. The gallbladder is moderately distended with mild wall   thickening and pericholecystic edema. If there is clinical concern for   acute cholecystitis, ultrasound or nuclear HIDA scan may be obtained.    Right colonic thickening may represent colitis. Correlate with recent   colonoscopy to exclude underlying neoplasm. Diffusediverticulosis.    Possible cystitis. Correlate with urinalysis.        --- End of Report ---            CATHRYN HAMLIN MD; Attending Radiologist  This document has been electronically signed. Sep 17 2022 11:34PM    < end of copied text >  < from: US Hepatic & Pancreatic (22 @ 18:56) >  ACC: [id number] EXAM:  US LIVER AND PANCREAS                          PROCEDURE DATE:  2022          INTERPRETATION:  CLINICAL INFORMATION: Sepsis. Fever. Elevated liver   enzymes.    COMPARISON: None available.    TECHNIQUE: Limited grayscale and color ultrasound of the abdomen was   performed.    FINDINGS:    Limited evaluation due to patient immobility and artifact from abundant   bowel gas.    Liver: Steatosis. Liver size is normal, measuring 17 cm. No focal   lesions. Limited Doppler evaluation demonstrates patency of the portal   and hepatic vein vasculature.    Intrahepatic ducts: Not dilated.    Common bile duct: Normal diameter, measuring 0.3 cm.    Gallbladder: Nondilated. Few gallstones. No wall thickening. Trace   pericholecystic fluid.   Negative sonographic Xavier's sign.    Pancreas: The visualized portions are normal in appearance.    Abdominal aorta: The visualized portions are normal in appearance.    Inferior vena cava: The visualized portions are normal in appearance.    Right kidney: No hydronephrosis. Normal echogenicity. No focal lesions.   Length of 9.9 cm.      IMPRESSION:  1.  Hepatic steatosis.  2.  Cholelithiasis without evidence of acute cholecystitis.    --- End of Report ---            MAURA AGUSTIN MD; Attending Radiologist  This document has been electronically signed. Sep 17 2022  7:24PM    < end of copied text >        ROS  [  ] UNABLE TO ELICIT               HPI:  Patient is a 77F, ambulatory with wheelchair, baseline AAOx2~3, lives with granddaughter&grandson, with PMHx of hypothyroidism, dementia, and sarcoidosis, who comes in with 1 day history of altered mental status. According to the daughter Dariana Doe [phone], patient could not comprehend anything, more confused today without any appetite. At the time of the interview, patient is AAOx2 and altered mental status is resolved. She denies any symptoms including headache, SOB, chest pain, abdominal pain, dysuria, hematuria, or bloody/dark stools. (18 Sep 2022 01:16)        History as above, asked to see this patient who presented to the hospital from home with AMS, she has a h/o some underlying dementia but got acutely worse, she is currently has her grandson and granddaughter at the bedside with her and they are giving some history, the patient herself is also awake and alert and also answering most questions appropriately but is a poor historian. She was found to have Gram negative Bacteremia here and the PCR is stating that it is E. Coli, she is denying any abdominal pain, nausea, vomiting or dysuria but is stating that she has some rectal pain on and off. She denies having fevers or chills either. She was found to have an elevated WBC count here which is decreasing, along with CT evidence of acute Cholecystitis and elevated LFTs she was also found to have some colonic thickening on the right side suggestive of colitis. She has no other complaints at this time and denies eating anything out of the ordinary, undercooked or raw.        PAST MEDICAL & SURGICAL HISTORY:  Dementia      Hypothyroidism      Sarcoidosis      No significant past surgical history          No Known Allergies      Meds:  heparin   Injectable 5000 Unit(s) SubCutaneous every 8 hours  levothyroxine Injectable 66 MICROGram(s) IV Push at bedtime  ondansetron Injectable 4 milliGRAM(s) IV Push every 6 hours PRN  pantoprazole  Injectable 40 milliGRAM(s) IV Push daily  piperacillin/tazobactam IVPB.- 3.375 Gram(s) IV Intermittent once  sodium chloride 0.9%. 1000 milliLiter(s) IV Continuous <Continuous>      SOCIAL HISTORY:  Smoker:  no  ETOH use:  no      FAMILY HISTORY: not contributory      VITALS:  Vital Signs Last 24 Hrs  T(C): 36.6 (18 Sep 2022 15:30), Max: 36.9 (18 Sep 2022 07:20)  T(F): 97.9 (18 Sep 2022 15:30), Max: 98.4 (18 Sep 2022 07:20)  HR: 62 (18 Sep 2022 15:30) (62 - 67)  BP: 115/68 (18 Sep 2022 15:30) (107/68 - 124/73)  BP(mean): 84 (18 Sep 2022 15:30) (84 - 84)  RR: 17 (18 Sep 2022 15:30) (17 - 18)  SpO2: 97% (18 Sep 2022 15:30) (97% - 99%)    Parameters below as of 18 Sep 2022 15:30  Patient On (Oxygen Delivery Method): room air        LABS/DIAGNOSTIC TESTS:                          12.2   16.08 )-----------( 330      ( 18 Sep 2022 05:20 )             38.7     WBC Count: 16.08 K/uL ( @ 05:20)  WBC Count: 16.92 K/uL ( @ 02:12)  WBC Count: 23.71 K/uL ( @ 16:50)          139  |  105  |  11  ----------------------------<  99  3.6   |  26  |  0.67    Ca    7.8<L>      18 Sep 2022 05:20  Phos  2.1       Mg     1.7         TPro  6.3  /  Alb  1.9<L>  /  TBili  3.1<H>  /  DBili  x   /  AST  862<H>  /  ALT  396<H>  /  AlkPhos  322<H>        Urinalysis Basic - ( 17 Sep 2022 17:40 )    Color: Shavon / Appearance: Slightly Turbid / S.015 / pH: x  Gluc: x / Ketone: Small  / Bili: Large / Urobili: 12   Blood: x / Protein: 100 / Nitrite: Negative   Leuk Esterase: Small / RBC: 5-10 /HPF / WBC 6-10 /HPF   Sq Epi: x / Non Sq Epi: Many /HPF / Bacteria: Moderate /HPF        LIVER FUNCTIONS - ( 18 Sep 2022 05:20 )  Alb: 1.9 g/dL / Pro: 6.3 g/dL / ALK PHOS: 322 U/L / ALT: 396 U/L DA / AST: 862 U/L / GGT: x                 LACTATE:    ABG -     CULTURES:   .Blood Blood-Peripheral   @ 16:30   Growth in aerobic bottle: Gram Negative Rods          RADIOLOGY:< from: CT Abdomen and Pelvis w/ IV Cont (22 @ 22:40) >  ACC: [id number] EXAM:  CT ABDOMEN AND PELVIS IC                          PROCEDURE DATE:  2022          INTERPRETATION:  CLINICAL INFORMATION: Abdominal pain, history of acute   pancreatitis    COMPARISON: None.    CONTRAST/COMPLICATIONS:  IV Contrast: Omnipaque 350  90 cc administered   10 cc discarded  Oral Contrast: NONE  Complications: None reported at time of study completion    PROCEDURE:  CT of the Abdomen and Pelvis was performed.  Sagittal and coronal reformats were performed.    FINDINGS:  LOWER CHEST: The heart is enlarged.    LIVER: Within normal limits.  BILE DUCTS: Normal caliber.  GALLBLADDER: Moderately distended with a small gallstone. There is mild   gallbladder wall thickening and pericholecystic fluid.  SPLEEN: Within normal limits.  PANCREAS: The body and tail of the pancreas appear indistinct with mild   peripancreatic fat stranding suggesting pancreatitis.  ADRENALS: Within normal limits.  KIDNEYS/URETERS: Within normal limits.    BLADDER: Incompletely distended with wall thickening and mucosal   hyperemia raising the possibility of cystitis.  REPRODUCTIVE ORGANS: Hysterectomy.    BOWEL: Small hiatal hernia. No bowel obstruction. Appendix unremarkable.   The right colon appears thickened with hyperemia suggesting colitis.   Scattered diffuse colonic diverticulosis.  PERITONEUM: No ascites.  VESSELS: Within normal limits.  RETROPERITONEUM/LYMPH NODES: No lymphadenopathy.  ABDOMINAL WALL: Fat-containing umbilical hernia.  BONES: Within normal limits.    IMPRESSION:    Mild acute interstitial edematous pancreatitis involving the pancreatic   body and tail. No focal collection, venous thrombosis, or pseudoaneurysm.  Gallstones. The gallbladder is moderately distended with mild wall   thickening and pericholecystic edema. If there is clinical concern for   acute cholecystitis, ultrasound or nuclear HIDA scan may be obtained.    Right colonic thickening may represent colitis. Correlate with recent   colonoscopy to exclude underlying neoplasm. Diffusediverticulosis.    Possible cystitis. Correlate with urinalysis.        --- End of Report ---            CATHRYN HAMLIN MD; Attending Radiologist  This document has been electronically signed. Sep 17 2022 11:34PM    < end of copied text >  ----------------------------------------------------------------------------------------------------------------------------------------------------------------------------------------------------------------------------------------------------------------------------------------------------------------------------------------------  ACC: [id number] EXAM:  US LIVER AND PANCREAS                          PROCEDURE DATE:  2022          INTERPRETATION:  CLINICAL INFORMATION: Sepsis. Fever. Elevated liver   enzymes.    COMPARISON: None available.    TECHNIQUE: Limited grayscale and color ultrasound of the abdomen was   performed.    FINDINGS:    Limited evaluation due to patient immobility and artifact from abundant   bowel gas.    Liver: Steatosis. Liver size is normal, measuring 17 cm. No focal   lesions. Limited Doppler evaluation demonstrates patency of the portal   and hepatic vein vasculature.    Intrahepatic ducts: Not dilated.    Common bile duct: Normal diameter, measuring 0.3 cm.    Gallbladder: Nondilated. Few gallstones. No wall thickening. Trace   pericholecystic fluid.   Negative sonographic Xavier's sign.    Pancreas: The visualized portions are normal in appearance.    Abdominal aorta: The visualized portions are normal in appearance.    Inferior vena cava: The visualized portions are normal in appearance.    Right kidney: No hydronephrosis. Normal echogenicity. No focal lesions.   Length of 9.9 cm.      IMPRESSION:  1.  Hepatic steatosis.  2.  Cholelithiasis without evidence of acute cholecystitis.    --- End of Report ---            MAURA AGUSTIN MD; Attending Radiologist  This document has been electronically signed. Sep 17 2022  7:24PM    < end of copied text >        ROS  [  ] UNABLE TO ELICIT

## 2022-09-19 DIAGNOSIS — Z02.9 ENCOUNTER FOR ADMINISTRATIVE EXAMINATIONS, UNSPECIFIED: ICD-10-CM

## 2022-09-19 DIAGNOSIS — R78.81 BACTEREMIA: ICD-10-CM

## 2022-09-19 LAB
ALBUMIN SERPL ELPH-MCNC: 1.7 G/DL — LOW (ref 3.5–5)
ALP SERPL-CCNC: 246 U/L — HIGH (ref 40–120)
ALT FLD-CCNC: 215 U/L DA — HIGH (ref 10–60)
ANION GAP SERPL CALC-SCNC: 10 MMOL/L — SIGNIFICANT CHANGE UP (ref 5–17)
AST SERPL-CCNC: 235 U/L — HIGH (ref 10–40)
BILIRUB SERPL-MCNC: 1.1 MG/DL — SIGNIFICANT CHANGE UP (ref 0.2–1.2)
BUN SERPL-MCNC: 8 MG/DL — SIGNIFICANT CHANGE UP (ref 7–18)
CALCIUM SERPL-MCNC: 8 MG/DL — LOW (ref 8.4–10.5)
CHLORIDE SERPL-SCNC: 108 MMOL/L — SIGNIFICANT CHANGE UP (ref 96–108)
CO2 SERPL-SCNC: 25 MMOL/L — SIGNIFICANT CHANGE UP (ref 22–31)
CREAT SERPL-MCNC: 0.59 MG/DL — SIGNIFICANT CHANGE UP (ref 0.5–1.3)
EGFR: 93 ML/MIN/1.73M2 — SIGNIFICANT CHANGE UP
GLUCOSE SERPL-MCNC: 68 MG/DL — LOW (ref 70–99)
GRAM STN FLD: SIGNIFICANT CHANGE UP
HCT VFR BLD CALC: 36.8 % — SIGNIFICANT CHANGE UP (ref 34.5–45)
HGB BLD-MCNC: 12 G/DL — SIGNIFICANT CHANGE UP (ref 11.5–15.5)
MAGNESIUM SERPL-MCNC: 1.9 MG/DL — SIGNIFICANT CHANGE UP (ref 1.6–2.6)
MCHC RBC-ENTMCNC: 30 PG — SIGNIFICANT CHANGE UP (ref 27–34)
MCHC RBC-ENTMCNC: 32.6 GM/DL — SIGNIFICANT CHANGE UP (ref 32–36)
MCV RBC AUTO: 92 FL — SIGNIFICANT CHANGE UP (ref 80–100)
NRBC # BLD: 0 /100 WBCS — SIGNIFICANT CHANGE UP (ref 0–0)
PHOSPHATE SERPL-MCNC: 2.4 MG/DL — LOW (ref 2.5–4.5)
PLATELET # BLD AUTO: 317 K/UL — SIGNIFICANT CHANGE UP (ref 150–400)
POTASSIUM SERPL-MCNC: 3.1 MMOL/L — LOW (ref 3.5–5.3)
POTASSIUM SERPL-SCNC: 3.1 MMOL/L — LOW (ref 3.5–5.3)
PROT SERPL-MCNC: 6.2 G/DL — SIGNIFICANT CHANGE UP (ref 6–8.3)
RBC # BLD: 4 M/UL — SIGNIFICANT CHANGE UP (ref 3.8–5.2)
RBC # FLD: 15.6 % — HIGH (ref 10.3–14.5)
SODIUM SERPL-SCNC: 143 MMOL/L — SIGNIFICANT CHANGE UP (ref 135–145)
WBC # BLD: 12.14 K/UL — HIGH (ref 3.8–10.5)
WBC # FLD AUTO: 12.14 K/UL — HIGH (ref 3.8–10.5)

## 2022-09-19 PROCEDURE — 99233 SBSQ HOSP IP/OBS HIGH 50: CPT

## 2022-09-19 PROCEDURE — 74181 MRI ABDOMEN W/O CONTRAST: CPT | Mod: 26

## 2022-09-19 PROCEDURE — 99232 SBSQ HOSP IP/OBS MODERATE 35: CPT

## 2022-09-19 RX ORDER — LANOLIN ALCOHOL/MO/W.PET/CERES
3 CREAM (GRAM) TOPICAL ONCE
Refills: 0 | Status: DISCONTINUED | OUTPATIENT
Start: 2022-09-19 | End: 2022-09-20

## 2022-09-19 RX ORDER — POTASSIUM CHLORIDE 20 MEQ
10 PACKET (EA) ORAL
Refills: 0 | Status: COMPLETED | OUTPATIENT
Start: 2022-09-19 | End: 2022-09-19

## 2022-09-19 RX ADMIN — Medication 100 MILLIEQUIVALENT(S): at 18:01

## 2022-09-19 RX ADMIN — HEPARIN SODIUM 5000 UNIT(S): 5000 INJECTION INTRAVENOUS; SUBCUTANEOUS at 14:46

## 2022-09-19 RX ADMIN — PIPERACILLIN AND TAZOBACTAM 25 GRAM(S): 4; .5 INJECTION, POWDER, LYOPHILIZED, FOR SOLUTION INTRAVENOUS at 02:09

## 2022-09-19 RX ADMIN — SODIUM CHLORIDE 100 MILLILITER(S): 9 INJECTION INTRAMUSCULAR; INTRAVENOUS; SUBCUTANEOUS at 14:47

## 2022-09-19 RX ADMIN — HEPARIN SODIUM 5000 UNIT(S): 5000 INJECTION INTRAVENOUS; SUBCUTANEOUS at 21:28

## 2022-09-19 RX ADMIN — Medication 66 MICROGRAM(S): at 00:46

## 2022-09-19 RX ADMIN — PIPERACILLIN AND TAZOBACTAM 25 GRAM(S): 4; .5 INJECTION, POWDER, LYOPHILIZED, FOR SOLUTION INTRAVENOUS at 18:25

## 2022-09-19 RX ADMIN — HEPARIN SODIUM 5000 UNIT(S): 5000 INJECTION INTRAVENOUS; SUBCUTANEOUS at 07:24

## 2022-09-19 RX ADMIN — Medication 66 MICROGRAM(S): at 21:54

## 2022-09-19 RX ADMIN — HEPARIN SODIUM 5000 UNIT(S): 5000 INJECTION INTRAVENOUS; SUBCUTANEOUS at 00:47

## 2022-09-19 RX ADMIN — Medication 100 MILLIEQUIVALENT(S): at 19:04

## 2022-09-19 RX ADMIN — PIPERACILLIN AND TAZOBACTAM 25 GRAM(S): 4; .5 INJECTION, POWDER, LYOPHILIZED, FOR SOLUTION INTRAVENOUS at 23:47

## 2022-09-19 RX ADMIN — PIPERACILLIN AND TAZOBACTAM 25 GRAM(S): 4; .5 INJECTION, POWDER, LYOPHILIZED, FOR SOLUTION INTRAVENOUS at 10:54

## 2022-09-19 RX ADMIN — Medication 100 MILLIEQUIVALENT(S): at 21:28

## 2022-09-19 RX ADMIN — PANTOPRAZOLE SODIUM 40 MILLIGRAM(S): 20 TABLET, DELAYED RELEASE ORAL at 14:46

## 2022-09-19 NOTE — PROGRESS NOTE ADULT - GASTROINTESTINAL
soft/nondistended/no guarding/no rigidity/no organomegaly/no palpable pelon/no masses palpable/tender details…

## 2022-09-19 NOTE — PROGRESS NOTE ADULT - SUBJECTIVE AND OBJECTIVE BOX
INTERVAL HPI/OVERNIGHT EVENTS:  Pt seen and examined at bedside  Reports abdominal pain improving   Pt resting comfortably. No acute complaints.   Denies N/V    MEDICATIONS  (STANDING):  heparin   Injectable 5000 Unit(s) SubCutaneous every 8 hours  levothyroxine Injectable 66 MICROGram(s) IV Push at bedtime  pantoprazole  Injectable 40 milliGRAM(s) IV Push daily  piperacillin/tazobactam IVPB.. 3.375 Gram(s) IV Intermittent every 8 hours  sodium chloride 0.9%. 1000 milliLiter(s) (100 mL/Hr) IV Continuous <Continuous>    MEDICATIONS  (PRN):  ondansetron Injectable 4 milliGRAM(s) IV Push every 6 hours PRN Nausea and/or Vomiting      Vital Signs Last 24 Hrs  T(C): 36.6 (19 Sep 2022 07:44), Max: 37.2 (18 Sep 2022 23:58)  T(F): 97.9 (19 Sep 2022 07:44), Max: 98.9 (18 Sep 2022 23:58)  HR: 56 (19 Sep 2022 07:44) (56 - 67)  BP: 124/57 (19 Sep 2022 07:44) (115/68 - 131/68)  BP(mean): 84 (18 Sep 2022 15:30) (84 - 84)  RR: 18 (19 Sep 2022 07:44) (17 - 18)  SpO2: 97% (19 Sep 2022 07:44) (97% - 99%)    Parameters below as of 19 Sep 2022 07:44  Patient On (Oxygen Delivery Method): room air        Physical:  General: A&Ox3. NAD.  Respirations: Unlabored   Abdomen: Soft nondistended, nontender. no rebound, no guarding, no peritonitis     I&O's Detail    18 Sep 2022 07:01  -  19 Sep 2022 07:00  --------------------------------------------------------  IN:  Total IN: 0 mL    OUT:    Voided (mL): 400 mL  Total OUT: 400 mL    Total NET: -400 mL          LABS:                        12.2   16.08 )-----------( 330      ( 18 Sep 2022 05:20 )             38.7             09-18    139  |  105  |  11  ----------------------------<  99  3.6   |  26  |  0.67    Ca    7.8<L>      18 Sep 2022 05:20  Phos  2.1     09-18  Mg     1.7     09-18    TPro  6.3  /  Alb  1.9<L>  /  TBili  3.1<H>  /  DBili  x   /  AST  862<H>  /  ALT  396<H>  /  AlkPhos  322<H>  09-18

## 2022-09-19 NOTE — PROGRESS NOTE ADULT - SUBJECTIVE AND OBJECTIVE BOX
Patient is a 77y old  Female who presents with a chief complaint of Pancreatitis (19 Sep 2022 13:06)      INTERVAL HPI/OVERNIGHT EVENTS:        REVIEW OF SYSTEMS:  CONSTITUTIONAL: No fever, chills  ENMT:  No difficulty hearing, no change in vision  NECK: No pain or stiffness  RESPIRATORY: No cough, SOB  CARDIOVASCULAR: No chest pain, palpitations  GASTROINTESTINAL: No abdominal pain. No nausea, vomiting, or diarrhea  GENITOURINARY: No dysuria  NEUROLOGICAL: No HA  SKIN: No itching, burning, rashes, or lesions   LYMPH NODES: No enlarged glands  ENDOCRINE: No heat or cold intolerance; No hair loss  MUSCULOSKELETAL: No joint pain or swelling; No muscle, back, or extremity pain  PSYCHIATRIC: No depression, anxiety  HEME/LYMPH: No easy bruising, or bleeding gums    T(C): 36.5 (22 @ 11:40), Max: 37.2 (22 @ 23:58)  HR: 53 (22 @ 11:40) (53 - 62)  BP: 130/71 (22 @ 11:40) (115/68 - 131/68)  RR: 16 (22 @ 11:40) (16 - 18)  SpO2: 98% (22 @ 11:40) (97% - 99%)  Wt(kg): --Vital Signs Last 24 Hrs  T(C): 36.5 (19 Sep 2022 11:40), Max: 37.2 (18 Sep 2022 23:58)  T(F): 97.7 (19 Sep 2022 11:40), Max: 98.9 (18 Sep 2022 23:58)  HR: 53 (19 Sep 2022 11:40) (53 - 62)  BP: 130/71 (19 Sep 2022 11:40) (115/68 - 131/68)  BP(mean): 84 (18 Sep 2022 15:30) (84 - 84)  RR: 16 (19 Sep 2022 11:40) (16 - 18)  SpO2: 98% (19 Sep 2022 11:40) (97% - 99%)    Parameters below as of 19 Sep 2022 11:40  Patient On (Oxygen Delivery Method): room air        PHYSICAL EXAM:  GENERAL: NAD  EYES: clear conjunctiva; EOMI  ENMT: Moist mucous membranes  NECK: Supple, No JVD, Normal thyroid  CHEST/LUNG: Clear to auscultation bilaterally; No rales, rhonchi, wheezing, or rubs  HEART: S1, S2, Regular rate and rhythm  ABDOMEN: Soft, Nontender, Nondistended; Bowel sounds present  NEURO: Alert & Oriented X3  EXTREMITIES: No LE edema, no calf tenderness  LYMPH: No lymphadenopathy noted  SKIN: No rashes or lesions    Consultant(s) Notes Reviewed:  [x ] YES  [ ] NO  Care Discussed with Consultants/Other Providers [ x] YES  [ ] NO    LABS:                        12.0   12.14 )-----------( 317      ( 19 Sep 2022 10:30 )             36.8     -    143  |  108  |  8   ----------------------------<  68<L>  3.1<L>   |  25  |  0.59    Ca    8.0<L>      19 Sep 2022 10:30  Phos  2.4       Mg     1.9         TPro  6.2  /  Alb  1.7<L>  /  TBili  1.1  /  DBili  x   /  AST  235<H>  /  ALT  215<H>  /  AlkPhos  246<H>        CAPILLARY BLOOD GLUCOSE            Urinalysis Basic - ( 17 Sep 2022 17:40 )    Color: Shavon / Appearance: Slightly Turbid / S.015 / pH: x  Gluc: x / Ketone: Small  / Bili: Large / Urobili: 12   Blood: x / Protein: 100 / Nitrite: Negative   Leuk Esterase: Small / RBC: 5-10 /HPF / WBC 6-10 /HPF   Sq Epi: x / Non Sq Epi: Many /HPF / Bacteria: Moderate /HPF        RADIOLOGY & ADDITIONAL TESTS:    Imaging Personally Reviewed:  [ ] YES  [ ] NO   Patient is a 77y old  Female who presents with a chief complaint of Pancreatitis (19 Sep 2022 13:06)    OVERNIGHT EVENTS: no acute events overnight, verbalizing feeling hungry       REVIEW OF SYSTEMS:  CONSTITUTIONAL: No fever, chills  NECK: No pain or stiffness  RESPIRATORY: No cough, SOB  CARDIOVASCULAR: No chest pain, palpitations  GASTROINTESTINAL: No abdominal pain. No nausea, vomiting, or diarrhea  GENITOURINARY: No dysuria  NEUROLOGICAL: No HA  MUSCULOSKELETAL: No joint pain or swelling    T(C): 36.5 (22 @ 11:40), Max: 37.2 (22 @ 23:58)  HR: 53 (22 @ 11:40) (53 - 62)  BP: 130/71 (22 @ 11:40) (115/68 - 131/68)  RR: 16 (22 @ 11:40) (16 - 18)  SpO2: 98% (22 @ 11:40) (97% - 99%)  Wt(kg): --Vital Signs Last 24 Hrs  T(C): 36.5 (19 Sep 2022 11:40), Max: 37.2 (18 Sep 2022 23:58)  T(F): 97.7 (19 Sep 2022 11:40), Max: 98.9 (18 Sep 2022 23:58)  HR: 53 (19 Sep 2022 11:40) (53 - 62)  BP: 130/71 (19 Sep 2022 11:40) (115/68 - 131/68)  BP(mean): 84 (18 Sep 2022 15:30) (84 - 84)  RR: 16 (19 Sep 2022 11:40) (16 - 18)  SpO2: 98% (19 Sep 2022 11:40) (97% - 99%)    Parameters below as of 19 Sep 2022 11:40  Patient On (Oxygen Delivery Method): room air    MEDICATIONS  (STANDING):  heparin   Injectable 5000 Unit(s) SubCutaneous every 8 hours  levothyroxine Injectable 66 MICROGram(s) IV Push at bedtime  pantoprazole  Injectable 40 milliGRAM(s) IV Push daily  piperacillin/tazobactam IVPB.. 3.375 Gram(s) IV Intermittent every 8 hours  sodium chloride 0.9%. 1000 milliLiter(s) (100 mL/Hr) IV Continuous <Continuous>    MEDICATIONS  (PRN):  ondansetron Injectable 4 milliGRAM(s) IV Push every 6 hours PRN Nausea and/or Vomiting      PHYSICAL EXAM:  GENERAL: NAD  EYES: clear conjunctiva  ENMT: Moist mucous membranes  NECK: Supple, No JVD  CHEST/LUNG: Clear to auscultation bilaterally; No rales, rhonchi, wheezing, or rubs  HEART: S1, S2, Regular rate and rhythm  ABDOMEN: Soft, Nontender, Nondistended; Bowel sounds present  NEURO: Alert & Oriented to person and place, confused to time ans situation   EXTREMITIES: No LE edema, no calf tenderness    Consultant(s) Notes Reviewed:  [x ] YES  [ ] NO  Care Discussed with Consultants/Other Providers [ x] YES  [ ] NO    LABS:                        12.0   12.14 )-----------( 317      ( 19 Sep 2022 10:30 )             36.8         143  |  108  |  8   ----------------------------<  68<L>  3.1<L>   |  25  |  0.59    Ca    8.0<L>      19 Sep 2022 10:30  Phos  2.4       Mg     1.9         TPro  6.2  /  Alb  1.7<L>  /  TBili  1.1  /  DBili  x   /  AST  235<H>  /  ALT  215<H>  /  AlkPhos  246<H>    CAPILLARY BLOOD GLUCOSE    Urinalysis Basic - ( 17 Sep 2022 17:40 )    Color: Shavon / Appearance: Slightly Turbid / S.015 / pH: x  Gluc: x / Ketone: Small  / Bili: Large / Urobili: 12   Blood: x / Protein: 100 / Nitrite: Negative   Leuk Esterase: Small / RBC: 5-10 /HPF / WBC 6-10 /HPF   Sq Epi: x / Non Sq Epi: Many /HPF / Bacteria: Moderate /HPF    RADIOLOGY & ADDITIONAL TESTS:    < from: MR MRCP No Cont (22 @ 13:31) >    ACC: [id number] EXAM:  MR MRCP                          PROCEDURE DATE:  2022          INTERPRETATION:  CLINICAL INFORMATION: Abdominal pain. Pancreatitis.   Cholelithiasis.    COMPARISON: No prior abdominal MR is available for comparison. Reference   is made with a previous abdominal CT dated 2022    CONTRAST/COMPLICATIONS:  IV Contrast: NONE  Oral Contrast: NONE  Complications: None reported at time of study completion    PROCEDURE:  MRI of the abdomen was performed.  MRCP was performed.    FINDINGS:  LOWER CHEST: Within normal limits.    LIVER: Within normal limits.  BILE DUCTS: Normal caliber. The common bile duct measures 6 mm in caliber   which is within normal limits. No evidence for choledocholithiasis.  GALLBLADDER: Small gallstones in the dependent gallbladder. Small   pericholecystic fluid and/or mild gallbladder wall thickening/edema.   Findings may represent acute cholecystitis. If clinically indicated, HIDA   scan may be pursued for further evaluation.  SPLEEN: Within normal limits.  PANCREAS: Mild edema adjacent to the pancreatic tail, suggestive of acute   pancreatitis. Clinical correlation with pancreatic enzymes is   recommended. The main pancreatic duct maintains normal caliber without   dilatation.  ADRENALS: Within normal limits.  KIDNEYS/URETERS: Small brightly T2 hyperintense lesion in the right   kidney, representing a probable cyst. The left kidney appears   unremarkable.    VISUALIZED PORTIONS:  BOWEL: Colonic diverticulosis.  PERITONEUM: No ascites.  VESSELS: Within normal limits.  RETROPERITONEUM/LYMPH NODES: No lymphadenopathy.  ABDOMINAL WALL: Small fat-containing umbilical hernia.  BONES: Within normal limits.    IMPRESSION: The common bile duct measures 6 mm in caliber which is within  normal limits. No evidence for choledocholithiasis.    Cholelithiasis. Small pericholecystic fluid and/or mild gallbladder wall   thickening/edema. Findings may represent acute cholecystitis. If   clinically indicated, HIDA scan may be pursued for further evaluation.    Mild edema adjacent to the pancreatic tail, suggestive of acute   pancreatitis. Clinical correlation with pancreatic enzymes is recommended.      < end of copied text >

## 2022-09-19 NOTE — PROGRESS NOTE ADULT - ASSESSMENT
78y/o f mostly bed ridden per daughter Dariana Doe [phone], baseline AAOx2, with PMH of hypothyroidism, dementia, and sarcoidosis, who comes in with 1 day history of new worsening altered mental status, anorexia , foul smelling urine and generalized weakness. Tmax-100.3F, significant leucocytosis , hypokalemic , markedly elevated LFTs and lipase >2800. US shows gallstones and CT shows mild interstitial edematous pancreas. Pt has gallstone pancreatitis    T. Bili 3.1 (3.4), transaminitis     -NPO, IVF   -F/u MRCP   -Lap daija planning if pt agreeable upon resolution of pancreatitis, pt clinically improved at this time. F/u medical clearance, optimization  -Pain control PRN   -Care as per primary  -Discussed with Dr. Iqbal

## 2022-09-19 NOTE — PROGRESS NOTE ADULT - ASSESSMENT
1.  Abdominal Pain  2. Gall stone pancreatitis  3. R/o cholecystitis  4. R/o CBD stone  5. Colitis  6. R/o colorectal neoplasm    Suggestions:    1. NPO  2. IVF hydration  3. Protonix daily  4. MRCP  5. Surgical follow up  6. Antibiotics IV  7. Check CEA  8. Colonoscopy  9. Follow up LFT's  10. DVT prophylaxis

## 2022-09-19 NOTE — PROGRESS NOTE ADULT - ASSESSMENT
Patient is a 77F, ambulatory with wheelchair, baseline AAOx2~3, with PMHx of hypothyroidism, dementia, and sarcoidosis, who comes in with 1 day history of altered mental status. Admitted for gallstone pancreatitis. Surgery consulted recommended lap daija after acute pancreatitis is resolved. GI on board, MRCP completed and suspicious for acute cholecystitis. Pt was also found to have e coli bacteremia and was continued on Zosyn, ID Dr. Quispe following.

## 2022-09-19 NOTE — PROGRESS NOTE ADULT - SUBJECTIVE AND OBJECTIVE BOX
[   ] ICU                                          [   ] CCU                                      [ x  ] Medical Floor      Patient is a 77 year old female with abdominal pain. GI consulted to evaluate.       HPI:  Patient is a 77 year old female, ambulatory with wheelchair, baseline AAOx2~3, lives with granddaughter & grandson, with past medical history significant for hypothyroidism, dementia, and sarcoidosis, presented with altered mental status. Patient c/o 2 days history of intermittent, sharp, 4/10 intensity epigastric abdominal pain radiating to her back associated with nausea. Patient denies hematemesis, hematochezia, melena, fever, chills, chest pain, SOB, cough, hematuria, dysuria or diarrhea.        PAIN MANAGEMENT:  Pain Scale:                4 /10  Pain Location:  Epigastric abdominal pain      Prior Colonoscopy:  No prior colonoscopy      PAST MEDICAL HISTORY  Dementia  Hypothyroidism  Sarcoidosis        PAST SURGICAL HISTORY  No significant past surgical history        Allergies    No Known Allergies    Intolerances  None       MEDICATIONS  (STANDING):  heparin   Injectable 5000 Unit(s) SubCutaneous every 8 hours  lactated ringers. 1000 milliLiter(s) (100 mL/Hr) IV Continuous <Continuous>  levothyroxine Injectable 66 MICROGram(s) IV Push at bedtime  pantoprazole  Injectable 40 milliGRAM(s) IV Push daily  piperacillin/tazobactam IVPB. 3.375 Gram(s) IV Intermittent once  piperacillin/tazobactam IVPB.- 3.375 Gram(s) IV Intermittent once    MEDICATIONS  (PRN):  ondansetron Injectable 4 milliGRAM(s) IV Push every 6 hours PRN Nausea and/or Vomiting      SOCIAL HISTORY  Advanced Directives:       [ X ] Full Code       [  ] DNR  Marital Status:         [  ] M      [ X ] S      [  ] D       [  ] W  Children:       [ X ] Yes      [  ] No  Occupation:        [  ] Employed       [ X ] Unemployed       [  ] Retired  Diet:       [ X ] Regular       [  ] PEG feeding          [  ] NG tube feeding  Drug Use:           [  ] Patient denied          [  ] Yes  Alcohol:           [ X ] No             [  ] Yes (socially)         [  ] Yes (chronic)  Tobacco:           [  ] Yes           [ X ] No      FAMILY HISTORY  [ X ] Heart Disease            [ X ] Diabetes             [X  ] HTN             [  ] Colon Cancer             [  ] Stomach Cancer              [  ] Pancreatic Cancer        VITALS  Vital Signs Last 24 Hrs  T(C): 36.5 (19 Sep 2022 11:40), Max: 37.2 (18 Sep 2022 23:58)  T(F): 97.7 (19 Sep 2022 11:40), Max: 98.9 (18 Sep 2022 23:58)  HR: 53 (19 Sep 2022 11:40) (53 - 62)  BP: 130/71 (19 Sep 2022 11:40) (115/68 - 131/68)  BP(mean): 84 (18 Sep 2022 15:30) (84 - 84)  RR: 16 (19 Sep 2022 11:40) (16 - 18)  SpO2: 98% (19 Sep 2022 11:40) (97% - 99%)  Parameters below as of 19 Sep 2022 11:40  Patient On (Oxygen Delivery Method): room air         MEDICATIONS  (STANDING):  heparin   Injectable 5000 Unit(s) SubCutaneous every 8 hours  levothyroxine Injectable 66 MICROGram(s) IV Push at bedtime  pantoprazole  Injectable 40 milliGRAM(s) IV Push daily  piperacillin/tazobactam IVPB.. 3.375 Gram(s) IV Intermittent every 8 hours  sodium chloride 0.9%. 1000 milliLiter(s) (100 mL/Hr) IV Continuous <Continuous>    MEDICATIONS  (PRN):  ondansetron Injectable 4 milliGRAM(s) IV Push every 6 hours PRN Nausea and/or Vomiting                            12.0   12.14 )-----------( 317      ( 19 Sep 2022 10:30 )             36.8       09-19    143  |  108  |  8   ----------------------------<  68<L>  3.1<L>   |  25  |  0.59    Ca    8.0<L>      19 Sep 2022 10:30  Phos  2.4     09-19  Mg     1.9     09-19    TPro  6.2  /  Alb  1.7<L>  /  TBili  1.1  /  DBili  x   /  AST  235<H>  /  ALT  215<H>  /  AlkPhos  246<H>  09-19

## 2022-09-20 DIAGNOSIS — K81.0 ACUTE CHOLECYSTITIS: ICD-10-CM

## 2022-09-20 LAB
-  AMIKACIN: SIGNIFICANT CHANGE UP
-  AMIKACIN: SIGNIFICANT CHANGE UP
-  AMOXICILLIN/CLAVULANIC ACID: SIGNIFICANT CHANGE UP
-  AMPICILLIN/SULBACTAM: SIGNIFICANT CHANGE UP
-  AMPICILLIN/SULBACTAM: SIGNIFICANT CHANGE UP
-  AMPICILLIN: SIGNIFICANT CHANGE UP
-  AMPICILLIN: SIGNIFICANT CHANGE UP
-  AZTREONAM: SIGNIFICANT CHANGE UP
-  AZTREONAM: SIGNIFICANT CHANGE UP
-  CEFAZOLIN: SIGNIFICANT CHANGE UP
-  CEFAZOLIN: SIGNIFICANT CHANGE UP
-  CEFEPIME: SIGNIFICANT CHANGE UP
-  CEFEPIME: SIGNIFICANT CHANGE UP
-  CEFOXITIN: SIGNIFICANT CHANGE UP
-  CEFOXITIN: SIGNIFICANT CHANGE UP
-  CEFTRIAXONE: SIGNIFICANT CHANGE UP
-  CEFTRIAXONE: SIGNIFICANT CHANGE UP
-  CIPROFLOXACIN: SIGNIFICANT CHANGE UP
-  CIPROFLOXACIN: SIGNIFICANT CHANGE UP
-  ERTAPENEM: SIGNIFICANT CHANGE UP
-  ERTAPENEM: SIGNIFICANT CHANGE UP
-  GENTAMICIN: SIGNIFICANT CHANGE UP
-  GENTAMICIN: SIGNIFICANT CHANGE UP
-  IMIPENEM: SIGNIFICANT CHANGE UP
-  IMIPENEM: SIGNIFICANT CHANGE UP
-  LEVOFLOXACIN: SIGNIFICANT CHANGE UP
-  LEVOFLOXACIN: SIGNIFICANT CHANGE UP
-  MEROPENEM: SIGNIFICANT CHANGE UP
-  MEROPENEM: SIGNIFICANT CHANGE UP
-  NITROFURANTOIN: SIGNIFICANT CHANGE UP
-  PIPERACILLIN/TAZOBACTAM: SIGNIFICANT CHANGE UP
-  PIPERACILLIN/TAZOBACTAM: SIGNIFICANT CHANGE UP
-  TIGECYCLINE: SIGNIFICANT CHANGE UP
-  TOBRAMYCIN: SIGNIFICANT CHANGE UP
-  TOBRAMYCIN: SIGNIFICANT CHANGE UP
-  TRIMETHOPRIM/SULFAMETHOXAZOLE: SIGNIFICANT CHANGE UP
-  TRIMETHOPRIM/SULFAMETHOXAZOLE: SIGNIFICANT CHANGE UP
ALBUMIN SERPL ELPH-MCNC: 1.8 G/DL — LOW (ref 3.5–5)
ALP SERPL-CCNC: 223 U/L — HIGH (ref 40–120)
ALT FLD-CCNC: 146 U/L DA — HIGH (ref 10–60)
ANION GAP SERPL CALC-SCNC: 7 MMOL/L — SIGNIFICANT CHANGE UP (ref 5–17)
AST SERPL-CCNC: 104 U/L — HIGH (ref 10–40)
BILIRUB SERPL-MCNC: 0.8 MG/DL — SIGNIFICANT CHANGE UP (ref 0.2–1.2)
BUN SERPL-MCNC: 3 MG/DL — LOW (ref 7–18)
CALCIUM SERPL-MCNC: 8.3 MG/DL — LOW (ref 8.4–10.5)
CHLORIDE SERPL-SCNC: 107 MMOL/L — SIGNIFICANT CHANGE UP (ref 96–108)
CO2 SERPL-SCNC: 27 MMOL/L — SIGNIFICANT CHANGE UP (ref 22–31)
CREAT SERPL-MCNC: 0.66 MG/DL — SIGNIFICANT CHANGE UP (ref 0.5–1.3)
CULTURE RESULTS: SIGNIFICANT CHANGE UP
EGFR: 90 ML/MIN/1.73M2 — SIGNIFICANT CHANGE UP
GLUCOSE SERPL-MCNC: 87 MG/DL — SIGNIFICANT CHANGE UP (ref 70–99)
GRAM STN FLD: SIGNIFICANT CHANGE UP
HCT VFR BLD CALC: 35.6 % — SIGNIFICANT CHANGE UP (ref 34.5–45)
HGB BLD-MCNC: 11.5 G/DL — SIGNIFICANT CHANGE UP (ref 11.5–15.5)
MCHC RBC-ENTMCNC: 29.3 PG — SIGNIFICANT CHANGE UP (ref 27–34)
MCHC RBC-ENTMCNC: 32.3 GM/DL — SIGNIFICANT CHANGE UP (ref 32–36)
MCV RBC AUTO: 90.6 FL — SIGNIFICANT CHANGE UP (ref 80–100)
METHOD TYPE: SIGNIFICANT CHANGE UP
METHOD TYPE: SIGNIFICANT CHANGE UP
NRBC # BLD: 0 /100 WBCS — SIGNIFICANT CHANGE UP (ref 0–0)
ORGANISM # SPEC MICROSCOPIC CNT: SIGNIFICANT CHANGE UP
PLATELET # BLD AUTO: 358 K/UL — SIGNIFICANT CHANGE UP (ref 150–400)
POTASSIUM SERPL-MCNC: 3 MMOL/L — LOW (ref 3.5–5.3)
POTASSIUM SERPL-SCNC: 3 MMOL/L — LOW (ref 3.5–5.3)
PROT SERPL-MCNC: 6.3 G/DL — SIGNIFICANT CHANGE UP (ref 6–8.3)
RBC # BLD: 3.93 M/UL — SIGNIFICANT CHANGE UP (ref 3.8–5.2)
RBC # FLD: 15.7 % — HIGH (ref 10.3–14.5)
SARS-COV-2 RNA SPEC QL NAA+PROBE: SIGNIFICANT CHANGE UP
SODIUM SERPL-SCNC: 141 MMOL/L — SIGNIFICANT CHANGE UP (ref 135–145)
SPECIMEN SOURCE: SIGNIFICANT CHANGE UP
WBC # BLD: 8.76 K/UL — SIGNIFICANT CHANGE UP (ref 3.8–10.5)
WBC # FLD AUTO: 8.76 K/UL — SIGNIFICANT CHANGE UP (ref 3.8–10.5)

## 2022-09-20 PROCEDURE — 99232 SBSQ HOSP IP/OBS MODERATE 35: CPT | Mod: 57

## 2022-09-20 PROCEDURE — 99233 SBSQ HOSP IP/OBS HIGH 50: CPT | Mod: FS

## 2022-09-20 RX ORDER — LEVOTHYROXINE SODIUM 125 MCG
88 TABLET ORAL DAILY
Refills: 0 | Status: DISCONTINUED | OUTPATIENT
Start: 2022-09-20 | End: 2022-09-21

## 2022-09-20 RX ORDER — ENOXAPARIN SODIUM 100 MG/ML
40 INJECTION SUBCUTANEOUS EVERY 24 HOURS
Refills: 0 | Status: DISCONTINUED | OUTPATIENT
Start: 2022-09-20 | End: 2022-09-21

## 2022-09-20 RX ORDER — CHLORHEXIDINE GLUCONATE 213 G/1000ML
1 SOLUTION TOPICAL DAILY
Refills: 0 | Status: COMPLETED | OUTPATIENT
Start: 2022-09-20 | End: 2022-09-21

## 2022-09-20 RX ORDER — OLANZAPINE 15 MG/1
2.5 TABLET, FILM COATED ORAL EVERY 6 HOURS
Refills: 0 | Status: DISCONTINUED | OUTPATIENT
Start: 2022-09-20 | End: 2022-09-21

## 2022-09-20 RX ORDER — POTASSIUM CHLORIDE 20 MEQ
40 PACKET (EA) ORAL ONCE
Refills: 0 | Status: COMPLETED | OUTPATIENT
Start: 2022-09-20 | End: 2022-09-20

## 2022-09-20 RX ORDER — LANOLIN ALCOHOL/MO/W.PET/CERES
5 CREAM (GRAM) TOPICAL ONCE
Refills: 0 | Status: COMPLETED | OUTPATIENT
Start: 2022-09-20 | End: 2022-09-20

## 2022-09-20 RX ADMIN — PIPERACILLIN AND TAZOBACTAM 25 GRAM(S): 4; .5 INJECTION, POWDER, LYOPHILIZED, FOR SOLUTION INTRAVENOUS at 18:18

## 2022-09-20 RX ADMIN — CHLORHEXIDINE GLUCONATE 1 APPLICATION(S): 213 SOLUTION TOPICAL at 21:17

## 2022-09-20 RX ADMIN — Medication 5 MILLIGRAM(S): at 01:00

## 2022-09-20 RX ADMIN — PANTOPRAZOLE SODIUM 40 MILLIGRAM(S): 20 TABLET, DELAYED RELEASE ORAL at 12:42

## 2022-09-20 RX ADMIN — Medication 40 MILLIEQUIVALENT(S): at 11:11

## 2022-09-20 RX ADMIN — ENOXAPARIN SODIUM 40 MILLIGRAM(S): 100 INJECTION SUBCUTANEOUS at 18:19

## 2022-09-20 RX ADMIN — PIPERACILLIN AND TAZOBACTAM 25 GRAM(S): 4; .5 INJECTION, POWDER, LYOPHILIZED, FOR SOLUTION INTRAVENOUS at 09:08

## 2022-09-20 RX ADMIN — HEPARIN SODIUM 5000 UNIT(S): 5000 INJECTION INTRAVENOUS; SUBCUTANEOUS at 05:37

## 2022-09-20 RX ADMIN — Medication 88 MICROGRAM(S): at 21:18

## 2022-09-20 NOTE — PROGRESS NOTE ADULT - ASSESSMENT
resolving p[ancreatitis  r/o GS pancrteatitis (resolving)  plan for lap daija tomorrow if pt agrees (she appears to be reluctant at present but states she might consider it tomorrow. keep npo after midnight for now  please indicate medical clearance/optimization on chart

## 2022-09-20 NOTE — DIETITIAN INITIAL EVALUATION ADULT - PERTINENT MEDS FT
MEDICATIONS  (STANDING):  heparin   Injectable 5000 Unit(s) SubCutaneous every 8 hours  levothyroxine Injectable 66 MICROGram(s) IV Push at bedtime  pantoprazole  Injectable 40 milliGRAM(s) IV Push daily  piperacillin/tazobactam IVPB.. 3.375 Gram(s) IV Intermittent every 8 hours  potassium chloride   Powder 40 milliEquivalent(s) Oral once  sodium chloride 0.9%. 1000 milliLiter(s) (100 mL/Hr) IV Continuous <Continuous>    MEDICATIONS  (PRN):  ondansetron Injectable 4 milliGRAM(s) IV Push every 6 hours PRN Nausea and/or Vomiting

## 2022-09-20 NOTE — PROGRESS NOTE ADULT - SUBJECTIVE AND OBJECTIVE BOX
NP Note discussed with  Primary Attending    Patient is a 77y old  Female who presents with a chief complaint of Pancreatitis (20 Sep 2022 11:40)      INTERVAL HPI/OVERNIGHT EVENTS: no acute events overnight    MEDICATIONS  (STANDING):  enoxaparin Injectable 40 milliGRAM(s) SubCutaneous every 24 hours  levothyroxine 88 MICROGram(s) Oral daily  piperacillin/tazobactam IVPB.. 3.375 Gram(s) IV Intermittent every 8 hours  sodium chloride 0.9%. 1000 milliLiter(s) (100 mL/Hr) IV Continuous <Continuous>    MEDICATIONS  (PRN):  OLANZapine Injectable 2.5 milliGRAM(s) IntraMuscular every 6 hours PRN Agitation  ondansetron Injectable 4 milliGRAM(s) IV Push every 6 hours PRN Nausea and/or Vomiting      __________________________________________________  REVIEW OF SYSTEMS:    limited due to patient's mental status      RESPIRATORY: No cough; No shortness of breath  CARDIOVASCULAR: No chest pain, no palpitations  GASTROINTESTINAL: No pain. No nausea or vomiting; No diarrhea   NEUROLOGICAL: No headache or numbness, no tremors  MUSCULOSKELETAL: No joint pain, no muscle pain        Vital Signs Last 24 Hrs  T(C): 36.2 (20 Sep 2022 12:37), Max: 36.7 (19 Sep 2022 20:34)  T(F): 97.2 (20 Sep 2022 12:37), Max: 98 (19 Sep 2022 20:34)  HR: 53 (20 Sep 2022 12:37) (53 - 59)  BP: 140/57 (20 Sep 2022 12:37) (140/57 - 149/66)  BP(mean): --  RR: 17 (20 Sep 2022 12:37) (17 - 18)  SpO2: 100% (20 Sep 2022 12:37) (97% - 100%)    Parameters below as of 20 Sep 2022 12:37  Patient On (Oxygen Delivery Method): room air        ________________________________________________  PHYSICAL EXAM:  GENERAL: NAD  HEENT: Normocephalic;  conjunctivae and sclerae clear  NECK : supple  CHEST/LUNG: Clear to auscultation bilaterally  HEART: S1 S2  regular; no murmurs  ABDOMEN: Soft, Nontender, Nondistended; Bowel sounds present  EXTREMITIES: no cyanosis; no edema; no calf tenderness  SKIN: warm and dry; no rash  NERVOUS SYSTEM:  Awake and alert; Oriented  to person    _________________________________________________  LABS:                        11.5   8.76  )-----------( 358      ( 20 Sep 2022 09:25 )             35.6     09-20    141  |  107  |  3<L>  ----------------------------<  87  3.0<L>   |  27  |  0.66    Ca    8.3<L>      20 Sep 2022 09:25  Phos  2.4     09-19  Mg     1.9     09-19    TPro  6.3  /  Alb  1.8<L>  /  TBili  0.8  /  DBili  x   /  AST  104<H>  /  ALT  146<H>  /  AlkPhos  223<H>  09-20        CAPILLARY BLOOD GLUCOSE            RADIOLOGY & ADDITIONAL TESTS:  < from: Xray Chest 1 View- PORTABLE-Urgent (09.17.22 @ 17:38) >  IMPRESSION: As above.    < end of copied text >  < from: US Hepatic & Pancreatic (09.17.22 @ 18:56) >  IMPRESSION:  1.  Hepatic steatosis.  2.  Cholelithiasis without evidence of acute cholecystitis.    < end of copied text >      < from: CT Abdomen and Pelvis w/ IV Cont (09.17.22 @ 22:40) >    IMPRESSION:    Mild acute interstitial edematous pancreatitis involving the pancreatic   body and tail. No focal collection, venous thrombosis, or pseudoaneurysm.  Gallstones. The gallbladder is moderately distended with mild wall   thickening and pericholecystic edema. If there is clinical concern for   acute cholecystitis, ultrasound or nuclear HIDA scan may be obtained.    Right colonic thickening may represent colitis. Correlate with recent   colonoscopy to exclude underlying neoplasm. Diffusediverticulosis.    Possible cystitis. Correlate with urinalysis.    < end of copied text >    < from: MR MRCP No Cont (09.19.22 @ 13:31) >    IMPRESSION: The common bile duct measures 6 mm in caliber which is within  normal limits. No evidence for choledocholithiasis.    Cholelithiasis. Small pericholecystic fluid and/or mild gallbladder wall   thickening/edema. Findings may represent acute cholecystitis. If   clinically indicated, HIDA scan may be pursued for further evaluation.    Mild edema adjacent to the pancreatic tail, suggestive of acute   pancreatitis. Clinical correlation with pancreatic enzymes is recommended.    < end of copied text >    Imaging Personally Reviewed:  YES    Consultant(s) Notes Reviewed:   YES      Plan of care was discussed with patient and /or primary care giver; all questions and concerns were addressed and care was aligned with patient's wishes.

## 2022-09-20 NOTE — DIETITIAN INITIAL EVALUATION ADULT - PERTINENT LABORATORY DATA
Labs today but anticipate likely uncontrolled due to dietary indiscretions and lack of exercise    Medication changes:   Stop: Actos   Maximize Metformin to 1000 mg BID - has not had any issues with Metformin.   Start: Ozempic 0.25 mg weekly for 4 weeks & then 0.5 mg weekly X4 weeks and then plan to increase to 1 mg weekly     -- Reviewed goals of therapy are to get the best control we can without hypoglycemia  -- Refer to diabetes education- MNT and comprehensive review.   -- Advised frequent self blood glucose monitoring.  Patient encouraged to document glucose results and bring them to every clinic visit - at least b.i.d. at alternating times.  Rx sent for new meter in supplies.  Send me blood sugar readings in 2-3 weeks to review    -- Hypoglycemia precautions discussed. Instructed on precautions before driving.    -- Call for Bg repeatedly < 90 or > 180.   -- Close adherence to lifestyle changes recommended.   -- Periodic follow ups for eye evaluations, foot care and dental care suggested.    -referral to ophthalmology for annual eye exam       09-20    141  |  107  |  3<L>  ----------------------------<  87  3.0<L>   |  27  |  0.66    Ca    8.3<L>      20 Sep 2022 09:25  Phos  2.4     09-19  Mg     1.9     09-19    TPro  6.3  /  Alb  1.8<L>  /  TBili  0.8  /  DBili  x   /  AST  104<H>  /  ALT  146<H>  /  AlkPhos  223<H>  09-20  A1C with Estimated Average Glucose Result: 6.0 % (01-11-22 @ 09:25)

## 2022-09-20 NOTE — PROGRESS NOTE ADULT - SUBJECTIVE AND OBJECTIVE BOX
NP Note discussed with  Primary Attending    Patient is a 77y old  Female who presents with a chief complaint of Acute pancreatitis without infection or necrosis     (20 Sep 2022 10:48)      INTERVAL HPI/OVERNIGHT EVENTS: no new complaints    MEDICATIONS  (STANDING):  heparin   Injectable 5000 Unit(s) SubCutaneous every 8 hours  levothyroxine 88 MICROGram(s) Oral daily  pantoprazole  Injectable 40 milliGRAM(s) IV Push daily  piperacillin/tazobactam IVPB.. 3.375 Gram(s) IV Intermittent every 8 hours  sodium chloride 0.9%. 1000 milliLiter(s) (100 mL/Hr) IV Continuous <Continuous>    MEDICATIONS  (PRN):  ondansetron Injectable 4 milliGRAM(s) IV Push every 6 hours PRN Nausea and/or Vomiting      __________________________________________________  REVIEW OF SYSTEMS:    CONSTITUTIONAL: No fever,   EYES: no acute visual disturbances  NECK: No pain or stiffness  RESPIRATORY: No cough; No shortness of breath  CARDIOVASCULAR: No chest pain, no palpitations  GASTROINTESTINAL: No pain. No nausea or vomiting; No diarrhea   NEUROLOGICAL: No headache or numbness, no tremors  MUSCULOSKELETAL: No joint pain, no muscle pain  GENITOURINARY: no dysuria, no frequency, no hesitancy  PSYCHIATRY: no depression , no anxiety  ALL OTHER  ROS negative        Vital Signs Last 24 Hrs  T(C): 36.2 (20 Sep 2022 05:00), Max: 36.7 (19 Sep 2022 20:34)  T(F): 97.1 (20 Sep 2022 05:00), Max: 98 (19 Sep 2022 20:34)  HR: 57 (20 Sep 2022 05:00) (53 - 59)  BP: 147/72 (20 Sep 2022 05:00) (130/71 - 149/66)  BP(mean): --  RR: 18 (20 Sep 2022 05:00) (16 - 18)  SpO2: 97% (20 Sep 2022 05:00) (97% - 98%)    Parameters below as of 20 Sep 2022 05:00  Patient On (Oxygen Delivery Method): room air        ________________________________________________  PHYSICAL EXAM:  GENERAL: NAD  HEENT: Normocephalic;  conjunctivae and sclerae clear; moist mucous membranes;   NECK : supple  CHEST/LUNG: Clear to auscultation bilaterally with good air entry   HEART: S1 S2  regular; no murmurs, gallops or rubs  ABDOMEN: Soft, Nontender, Nondistended; Bowel sounds present  EXTREMITIES: no cyanosis; no edema; no calf tenderness  SKIN: warm and dry; no rash  NERVOUS SYSTEM:  Awake and alert; Oriented  to place, person and time ; no new deficits    _________________________________________________  LABS:                        11.5   8.76  )-----------( 358      ( 20 Sep 2022 09:25 )             35.6     09-20    141  |  107  |  3<L>  ----------------------------<  87  3.0<L>   |  27  |  0.66    Ca    8.3<L>      20 Sep 2022 09:25  Phos  2.4     09-19  Mg     1.9     09-19    TPro  6.3  /  Alb  1.8<L>  /  TBili  0.8  /  DBili  x   /  AST  104<H>  /  ALT  146<H>  /  AlkPhos  223<H>  09-20        CAPILLARY BLOOD GLUCOSE            RADIOLOGY & ADDITIONAL TESTS:    Imaging Personally Reviewed:  YES/NO    Consultant(s) Notes Reviewed:   YES/ No    Care Discussed with Consultants :     Plan of care was discussed with patient and /or primary care giver; all questions and concerns were addressed and care was aligned with patient's wishes.

## 2022-09-20 NOTE — PROGRESS NOTE ADULT - SUBJECTIVE AND OBJECTIVE BOX
77y Female is under our care for bacteremia with E. Coli, acute cholecystitis, UTI and ? colitis. Patient is doing well and has no complaints at this time, besides chronic generalized aches. BC is growing 4/4 bottles with pansensitive e. coli and UC is growing >100k e. coli, sensitivity is pending. Repeat BC is testing. MRCP was done yesterday and showed findings of possible acute cholecystitis and acute pancreatitis. Surgery plans to do lap daija once acute pancreatitis improves. Patient refused blood work yesterday so no updated lipase value.    MEDS:  piperacillin/tazobactam IVPB.. 3.375 Gram(s) IV Intermittent every 8 hours    ALLERGIES: Allergies    No Known Allergies    Intolerances    REVIEW OF SYSTEMS:  [  ] Not able to illicit  General: no fevers no malaise +generalized aches  Chest: no cough no sob  GI: no nvd  : no urinary sxs   Skin: no rashes  Musculoskeletal: no trauma no LBP  Neuro: no ha's no dizziness 	    VITALS:  Vital Signs Last 24 Hrs  T(C): 36.2 (20 Sep 2022 12:37), Max: 36.7 (19 Sep 2022 20:34)  T(F): 97.2 (20 Sep 2022 12:37), Max: 98 (19 Sep 2022 20:34)  HR: 53 (20 Sep 2022 12:37) (53 - 59)  BP: 140/57 (20 Sep 2022 12:37) (140/57 - 149/66)  BP(mean): --  RR: 17 (20 Sep 2022 12:37) (17 - 18)  SpO2: 100% (20 Sep 2022 12:37) (97% - 100%)    Parameters below as of 20 Sep 2022 12:37  Patient On (Oxygen Delivery Method): room ai      PHYSICAL EXAM:  HEENT: n/a  Neck: supple no LN's   Respiratory: lungs clear, but raspy no rales  Cardiovascular: S1 S2 reg no murmurs  Gastrointestinal: +BS with soft, nondistended abdomen; nontender  Extremities: no edema  Skin: no rashes  Ortho: n/a  Neuro: AAO x 2-3      LABS/DIAGNOSTIC TESTS:                        11.5   8.76  )-----------( 358      ( 20 Sep 2022 09:25 )             35.6     WBC Count: 8.76 K/uL (09-20 @ 09:25)  WBC Count: 12.14 K/uL (09-19 @ 10:30)  WBC Count: 16.08 K/uL (09-18 @ 05:20)  WBC Count: 16.92 K/uL (09-18 @ 02:12)  WBC Count: 23.71 K/uL (09-17 @ 16:50)    09-20    141  |  107  |  3<L>  ----------------------------<  87  3.0<L>   |  27  |  0.66    Ca    8.3<L>      20 Sep 2022 09:25  Phos  2.4     09-19  Mg     1.9     09-19    TPro  6.3  /  Alb  1.8<L>  /  TBili  0.8  /  DBili  x   /  AST  104<H>  /  ALT  146<H>  /  AlkPhos  223<H>  09-20      CULTURES:   9/20 BC - pending     Clean Catch Clean Catch (Midstream)  09-17 @ 17:40   >100,000 CFU/ml Escherichia coli  --  --      .Blood Blood-Peripheral  09-17 @ 16:35   Growth in aerobic bottle: Escherichia coli  See previous culture [id number]  Growth in anaerobic bottle: Gram Negative Rods  --    Growth in aerobic bottle: Gram Negative Rods  Growth in anaerobic bottle: Gram Negative Rods      .Blood Blood-Peripheral  09-17 @ 16:30   Growth in aerobic bottle: Escherichia coli  Growth in anaerobic bottle: Gram Negative Rods  ***Blood Panel PCR results on this specimen are available  approximately 3 hours after the Gram stain result.***  Gram stain, PCR, and/or culture results may not always  correspond due to difference in methodologies.  ************************************************************  This PCR assay was performed by multiplex PCR. This  Assay tests for 66 bacterial and resistance gene targets.  Please refer [facility] test directory  at [email or web address] for details.  --  Blood Culture PCR  Escherichia coli        RADIOLOGY:  no new studies

## 2022-09-20 NOTE — PROGRESS NOTE ADULT - PROBLEM SELECTOR PLAN 3
-likely baseline dementia worsened in setting of acute infection   -rest plan as above - surgery recommending lap daija once pancreatitis resolves  - rest of plan as above

## 2022-09-20 NOTE — PROGRESS NOTE ADULT - ASSESSMENT
Bacteremia with E. Coli  Acute Cholecystitis   UTI  Colitis - as per CT  Leukocytosis - normalized    Plan:  ·	Cont Zosyn 3.375 mgs iv q8hrs for now  (D4 of all antibiotics)  ·	awaiting repeat BC results   ·	awaiting UC sensitivity  ·	Surgery plans for lap daija once pancreatitis improves

## 2022-09-20 NOTE — PROGRESS NOTE ADULT - PROBLEM SELECTOR PLAN 2
- MRCP consistent with acute pancreatitis   - cont IV Zosyn   -cont clear liquid diet  - cont IVF   - GI Dr. Barker following - MRCP consistent with acute pancreatitis   - cont IV Zosyn   - cont clear liquid diet  - cont IVF   - GI Dr. Barker following

## 2022-09-20 NOTE — DIETITIAN INITIAL EVALUATION ADULT - OTHER INFO
Pt visited. Pt is alert but confused. Pt  seen for NPO / Clear liquid = 4 days.  Pt is On clear Liquid at Present. Labs Noted Lipase 567. Pt admitted with Acute UTI, and Gallstones Pancreatitis. Pt visited. Pt is alert but confused. Pt  seen for NPO / Clear liquid = 4 days.  Pt is On clear Liquid at Present. Labs Noted Lipase 567. Pt admitted with Acute UTI, and Gallstones Pancreatitis. Current Diet tolerated. GI consult Noted.

## 2022-09-20 NOTE — PROGRESS NOTE ADULT - PROBLEM SELECTOR PLAN 4
- E coli UTI   - cont IV Zosyn   - ID Dr. Quispe following -likely baseline dementia worsened in setting of acute infection   -rest plan as above

## 2022-09-20 NOTE — PROGRESS NOTE ADULT - ASSESSMENT
Patient is a 77 year old female, baseline AAOx2~3, with PMHx of hypothyroidism, dementia, and sarcoidosis, who comes in with 1 day history of altered mental status. Admitted for gallstone pancreatitis. Surgery consulted recommended lap daija after acute pancreatitis is resolved. GI on board, MRCP completed and c/f for acute cholecystitis and acute pancreatitis Hospital course c/b E. coli bacteremia Ucx also + E.coli and was continued on Zosyn, ID Dr. Quispe following. Repeat blood cultures today 9/20.

## 2022-09-20 NOTE — PROGRESS NOTE ADULT - NS PANP COMMENT GEN_ALL_CORE FT
Patient apparently agitated overnight and pulled out IV 3 times and now on wrist restraints. I promptly removed wrist restraints. I explained to nurse at bedside we need to avoid physical restraints unless patient at risk of harming herself. Not quite sure removing peripheral IV meets this threshold. I empathize with nursing frustration, but restraints will only worsen her agitation and it is inhumane. As such I ordered Zyprexa PRN so that we can chemically sedate her if needed. On my assessment she is quite redirectable.    She is on Zosyn for E. Coli bacteremia suspected to be due to cholecystitis. She is improving from a gallstone pancreatitis standpoint. LFTs are downtrending. Repeat blood cultures are in the lab. She is medically optimized for cholecystectomy which she will ideally need.

## 2022-09-20 NOTE — PROGRESS NOTE ADULT - NSPROGADDITIONALINFOA_GEN_ALL_CORE
Patient admitted with Cholelithiasis and gallstone pancreatitis. Radiology studies reviewed. Tender RUQ abdomen. Laparoscopic cholecystectomy possible open was discussed. The potential for bleeding, CBD injury, bowel injury and other related complications were discussed. All the questions were answered.  Spoke to the daughter Celina Vásquez. Telephone ocnsent and also explained to the pt  Informed consent for laparoscopic cholecystectomy possible open surgery was obtained

## 2022-09-20 NOTE — PROGRESS NOTE ADULT - SUBJECTIVE AND OBJECTIVE BOX
Pt s- new complaints. n oabd pain no n/v  ICU Vital Signs Last 24 Hrs  T(C): 36.2 (20 Sep 2022 05:00), Max: 36.7 (19 Sep 2022 20:34)  T(F): 97.1 (20 Sep 2022 05:00), Max: 98 (19 Sep 2022 20:34)  HR: 57 (20 Sep 2022 05:00) (53 - 59)  BP: 147/72 (20 Sep 2022 05:00) (130/71 - 149/66)  BP(mean): --  ABP: --  ABP(mean): --  RR: 18 (20 Sep 2022 05:00) (16 - 18)  SpO2: 97% (20 Sep 2022 05:00) (97% - 98%)    O2 Parameters below as of 20 Sep 2022 05:00  Patient On (Oxygen Delivery Method): room air        Alert nad  abd: soft nt nd                          11.5   8.76  )-----------( 358      ( 20 Sep 2022 09:25 )             35.6   09-20    141  |  107  |  3<L>  ----------------------------<  87  3.0<L>   |  27  |  0.66    Ca    8.3<L>      20 Sep 2022 09:25  Phos  2.4     09-19  Mg     1.9     09-19    TPro  6.3  /  Alb  1.8<L>  /  TBili  0.8  /  DBili  x   /  AST  104<H>  /  ALT  146<H>  /  AlkPhos  223<H>  09-20

## 2022-09-21 LAB
ABO RH CONFIRMATION: SIGNIFICANT CHANGE UP
ALBUMIN SERPL ELPH-MCNC: 1.7 G/DL — LOW (ref 3.5–5)
ALP SERPL-CCNC: 186 U/L — HIGH (ref 40–120)
ALT FLD-CCNC: 106 U/L DA — HIGH (ref 10–60)
ANION GAP SERPL CALC-SCNC: 4 MMOL/L — LOW (ref 5–17)
APTT BLD: 32.7 SEC — SIGNIFICANT CHANGE UP (ref 27.5–35.5)
AST SERPL-CCNC: 70 U/L — HIGH (ref 10–40)
BILIRUB SERPL-MCNC: 0.6 MG/DL — SIGNIFICANT CHANGE UP (ref 0.2–1.2)
BLD GP AB SCN SERPL QL: SIGNIFICANT CHANGE UP
BUN SERPL-MCNC: 3 MG/DL — LOW (ref 7–18)
CALCIUM SERPL-MCNC: 8.4 MG/DL — SIGNIFICANT CHANGE UP (ref 8.4–10.5)
CHLORIDE SERPL-SCNC: 110 MMOL/L — HIGH (ref 96–108)
CO2 SERPL-SCNC: 29 MMOL/L — SIGNIFICANT CHANGE UP (ref 22–31)
CREAT SERPL-MCNC: 0.69 MG/DL — SIGNIFICANT CHANGE UP (ref 0.5–1.3)
CULTURE RESULTS: SIGNIFICANT CHANGE UP
EGFR: 89 ML/MIN/1.73M2 — SIGNIFICANT CHANGE UP
GLUCOSE SERPL-MCNC: 86 MG/DL — SIGNIFICANT CHANGE UP (ref 70–99)
HCT VFR BLD CALC: 35.8 % — SIGNIFICANT CHANGE UP (ref 34.5–45)
HGB BLD-MCNC: 11.6 G/DL — SIGNIFICANT CHANGE UP (ref 11.5–15.5)
INR BLD: 1.08 RATIO — SIGNIFICANT CHANGE UP (ref 0.88–1.16)
MCHC RBC-ENTMCNC: 29.6 PG — SIGNIFICANT CHANGE UP (ref 27–34)
MCHC RBC-ENTMCNC: 32.4 GM/DL — SIGNIFICANT CHANGE UP (ref 32–36)
MCV RBC AUTO: 91.3 FL — SIGNIFICANT CHANGE UP (ref 80–100)
NRBC # BLD: 0 /100 WBCS — SIGNIFICANT CHANGE UP (ref 0–0)
PLATELET # BLD AUTO: 346 K/UL — SIGNIFICANT CHANGE UP (ref 150–400)
POTASSIUM SERPL-MCNC: 3.3 MMOL/L — LOW (ref 3.5–5.3)
POTASSIUM SERPL-SCNC: 3.3 MMOL/L — LOW (ref 3.5–5.3)
PROT SERPL-MCNC: 5.9 G/DL — LOW (ref 6–8.3)
PROTHROM AB SERPL-ACNC: 12.9 SEC — SIGNIFICANT CHANGE UP (ref 10.5–13.4)
RBC # BLD: 3.92 M/UL — SIGNIFICANT CHANGE UP (ref 3.8–5.2)
RBC # FLD: 16 % — HIGH (ref 10.3–14.5)
SODIUM SERPL-SCNC: 143 MMOL/L — SIGNIFICANT CHANGE UP (ref 135–145)
WBC # BLD: 7.84 K/UL — SIGNIFICANT CHANGE UP (ref 3.8–10.5)
WBC # FLD AUTO: 7.84 K/UL — SIGNIFICANT CHANGE UP (ref 3.8–10.5)

## 2022-09-21 PROCEDURE — 47562 LAPAROSCOPIC CHOLECYSTECTOMY: CPT

## 2022-09-21 PROCEDURE — 99233 SBSQ HOSP IP/OBS HIGH 50: CPT | Mod: FS

## 2022-09-21 PROCEDURE — 88304 TISSUE EXAM BY PATHOLOGIST: CPT | Mod: 26

## 2022-09-21 DEVICE — CLIP APPLIER COVIDIEN ENDOCLIP II 10MM MED/LG: Type: IMPLANTABLE DEVICE | Site: LAPAROSCOPIC CHOLECYSTECTOMY | Status: FUNCTIONAL

## 2022-09-21 RX ORDER — ACETAMINOPHEN 500 MG
650 TABLET ORAL EVERY 6 HOURS
Refills: 0 | Status: DISCONTINUED | OUTPATIENT
Start: 2022-09-21 | End: 2022-09-23

## 2022-09-21 RX ORDER — FENTANYL CITRATE 50 UG/ML
50 INJECTION INTRAVENOUS
Refills: 0 | Status: DISCONTINUED | OUTPATIENT
Start: 2022-09-21 | End: 2022-09-21

## 2022-09-21 RX ORDER — ENOXAPARIN SODIUM 100 MG/ML
40 INJECTION SUBCUTANEOUS EVERY 24 HOURS
Refills: 0 | Status: DISCONTINUED | OUTPATIENT
Start: 2022-09-22 | End: 2022-09-23

## 2022-09-21 RX ORDER — FENTANYL CITRATE 50 UG/ML
25 INJECTION INTRAVENOUS
Refills: 0 | Status: DISCONTINUED | OUTPATIENT
Start: 2022-09-21 | End: 2022-09-21

## 2022-09-21 RX ORDER — PANTOPRAZOLE SODIUM 20 MG/1
40 TABLET, DELAYED RELEASE ORAL DAILY
Refills: 0 | Status: DISCONTINUED | OUTPATIENT
Start: 2022-09-22 | End: 2022-09-23

## 2022-09-21 RX ORDER — POTASSIUM CHLORIDE 20 MEQ
10 PACKET (EA) ORAL
Refills: 0 | Status: DISCONTINUED | OUTPATIENT
Start: 2022-09-21 | End: 2022-09-21

## 2022-09-21 RX ORDER — MORPHINE SULFATE 50 MG/1
2 CAPSULE, EXTENDED RELEASE ORAL EVERY 6 HOURS
Refills: 0 | Status: DISCONTINUED | OUTPATIENT
Start: 2022-09-22 | End: 2022-09-23

## 2022-09-21 RX ORDER — SODIUM CHLORIDE 9 MG/ML
1000 INJECTION INTRAMUSCULAR; INTRAVENOUS; SUBCUTANEOUS
Refills: 0 | Status: DISCONTINUED | OUTPATIENT
Start: 2022-09-21 | End: 2022-09-23

## 2022-09-21 RX ORDER — OLANZAPINE 15 MG/1
2.5 TABLET, FILM COATED ORAL EVERY 6 HOURS
Refills: 0 | Status: DISCONTINUED | OUTPATIENT
Start: 2022-09-22 | End: 2022-09-23

## 2022-09-21 RX ORDER — LEVOTHYROXINE SODIUM 125 MCG
88 TABLET ORAL DAILY
Refills: 0 | Status: DISCONTINUED | OUTPATIENT
Start: 2022-09-21 | End: 2022-09-23

## 2022-09-21 RX ORDER — ONDANSETRON 8 MG/1
4 TABLET, FILM COATED ORAL EVERY 6 HOURS
Refills: 0 | Status: DISCONTINUED | OUTPATIENT
Start: 2022-09-22 | End: 2022-09-23

## 2022-09-21 RX ORDER — OXYCODONE AND ACETAMINOPHEN 5; 325 MG/1; MG/1
1 TABLET ORAL EVERY 6 HOURS
Refills: 0 | Status: DISCONTINUED | OUTPATIENT
Start: 2022-09-22 | End: 2022-09-23

## 2022-09-21 RX ADMIN — Medication 88 MICROGRAM(S): at 05:43

## 2022-09-21 RX ADMIN — SODIUM CHLORIDE 100 MILLILITER(S): 9 INJECTION INTRAMUSCULAR; INTRAVENOUS; SUBCUTANEOUS at 08:05

## 2022-09-21 RX ADMIN — SODIUM CHLORIDE 110 MILLILITER(S): 9 INJECTION INTRAMUSCULAR; INTRAVENOUS; SUBCUTANEOUS at 17:53

## 2022-09-21 RX ADMIN — Medication 100 MILLIEQUIVALENT(S): at 10:38

## 2022-09-21 RX ADMIN — CHLORHEXIDINE GLUCONATE 1 APPLICATION(S): 213 SOLUTION TOPICAL at 10:44

## 2022-09-21 RX ADMIN — Medication 100 MILLIEQUIVALENT(S): at 10:45

## 2022-09-21 RX ADMIN — PIPERACILLIN AND TAZOBACTAM 25 GRAM(S): 4; .5 INJECTION, POWDER, LYOPHILIZED, FOR SOLUTION INTRAVENOUS at 08:05

## 2022-09-21 RX ADMIN — PIPERACILLIN AND TAZOBACTAM 25 GRAM(S): 4; .5 INJECTION, POWDER, LYOPHILIZED, FOR SOLUTION INTRAVENOUS at 00:10

## 2022-09-21 NOTE — PROGRESS NOTE ADULT - ASSESSMENT
76 y/o Female w/ resolving GS Pancreatitis     -Laparoscopic cholecystectomy, possible open today   -NPO  -IVF   -Abx   -D/w Dr. Iqbal and agrees

## 2022-09-21 NOTE — PROGRESS NOTE ADULT - SUBJECTIVE AND OBJECTIVE BOX
NP Note discussed with  Primary Attending    Patient is a 77y old  Female who presents with a chief complaint of Pancreatitis (21 Sep 2022 08:07)      INTERVAL HPI/OVERNIGHT EVENTS: no new complaints    MEDICATIONS  (STANDING):    MEDICATIONS  (PRN):      __________________________________________________  REVIEW OF SYSTEMS:    CONSTITUTIONAL: No fever,   EYES: no acute visual disturbances  NECK: No pain or stiffness  RESPIRATORY: No cough; No shortness of breath  CARDIOVASCULAR: No chest pain, no palpitations  GASTROINTESTINAL: No pain. No nausea or vomiting; No diarrhea   NEUROLOGICAL: No headache or numbness, no tremors  MUSCULOSKELETAL: No joint pain, no muscle pain  GENITOURINARY: no dysuria, no frequency, no hesitancy  PSYCHIATRY: no depression , no anxiety  ALL OTHER  ROS negative        Vital Signs Last 24 Hrs  T(C): 36.6 (21 Sep 2022 10:58), Max: 36.6 (21 Sep 2022 10:58)  T(F): 97.9 (21 Sep 2022 10:58), Max: 97.9 (21 Sep 2022 10:58)  HR: 48 (21 Sep 2022 10:58) (48 - 57)  BP: 144/68 (21 Sep 2022 10:58) (134/58 - 144/68)  BP(mean): --  RR: 17 (21 Sep 2022 10:58) (17 - 17)  SpO2: 100% (21 Sep 2022 10:58) (95% - 100%)    Parameters below as of 21 Sep 2022 10:58  Patient On (Oxygen Delivery Method): room air        ________________________________________________  PHYSICAL EXAM:  GENERAL: NAD  HEENT: Normocephalic;  conjunctivae and sclerae clear; moist mucous membranes;   NECK : supple  CHEST/LUNG: Clear to auscultation bilaterally with good air entry   HEART: S1 S2  regular; no murmurs, gallops or rubs  ABDOMEN: Soft, Nontender, Nondistended; Bowel sounds present  EXTREMITIES: no cyanosis; no edema; no calf tenderness  SKIN: warm and dry; no rash  NERVOUS SYSTEM:  Awake and alert; Oriented  to place, person and time ; no new deficits    _________________________________________________  LABS:                        11.6   7.84  )-----------( 346      ( 21 Sep 2022 07:51 )             35.8     09-21    143  |  110<H>  |  3<L>  ----------------------------<  86  3.3<L>   |  29  |  0.69    Ca    8.4      21 Sep 2022 07:51    TPro  5.9<L>  /  Alb  1.7<L>  /  TBili  0.6  /  DBili  x   /  AST  70<H>  /  ALT  106<H>  /  AlkPhos  186<H>  09-21    PT/INR - ( 21 Sep 2022 07:51 )   PT: 12.9 sec;   INR: 1.08 ratio         PTT - ( 21 Sep 2022 07:51 )  PTT:32.7 sec    CAPILLARY BLOOD GLUCOSE            RADIOLOGY & ADDITIONAL TESTS:    Imaging  Reviewed:  YES/NO    Consultant(s) Notes Reviewed:   YES/ No      Plan of care was discussed with patient and /or primary care giver; all questions and concerns were addressed  NP Note discussed with  Primary Attending    Patient is a 77y old  Female who presents with a chief complaint of Pancreatitis (21 Sep 2022 08:07)      INTERVAL HPI/OVERNIGHT EVENTS: no new complaints    MEDICATIONS  (STANDING):    MEDICATIONS  (PRN):      __________________________________________________  REVIEW OF SYSTEMS:    CONSTITUTIONAL: No fever,   EYES: no acute visual disturbances  NECK: No pain or stiffness  RESPIRATORY: No cough; No shortness of breath  CARDIOVASCULAR: No chest pain, no palpitations  GASTROINTESTINAL: No pain. No nausea or vomiting; No diarrhea   NEUROLOGICAL: No headache or numbness, no tremors  MUSCULOSKELETAL: No joint pain, no muscle pain  GENITOURINARY: no dysuria, no frequency, no hesitancy  PSYCHIATRY: no depression , no anxiety  ALL OTHER  ROS negative        Vital Signs Last 24 Hrs  T(C): 36.6 (21 Sep 2022 10:58), Max: 36.6 (21 Sep 2022 10:58)  T(F): 97.9 (21 Sep 2022 10:58), Max: 97.9 (21 Sep 2022 10:58)  HR: 48 (21 Sep 2022 10:58) (48 - 57)  BP: 144/68 (21 Sep 2022 10:58) (134/58 - 144/68)  BP(mean): --  RR: 17 (21 Sep 2022 10:58) (17 - 17)  SpO2: 100% (21 Sep 2022 10:58) (95% - 100%)    Parameters below as of 21 Sep 2022 10:58  Patient On (Oxygen Delivery Method): room air        ________________________________________________  PHYSICAL EXAM:  GENERAL: NAD  HEENT: Normocephalic;  conjunctivae and sclerae clear; moist mucous membranes;   NECK : supple  CHEST/LUNG: Clear to auscultation bilaterally with good air entry   HEART: S1 S2  regular; no murmurs, gallops or rubs  ABDOMEN: Soft, Nontender, Nondistended; Bowel sounds present  EXTREMITIES: no cyanosis; no edema; no calf tenderness  SKIN: warm and dry; no rash  NERVOUS SYSTEM:  Awake and alert; Oriented  to place, person and time ; no new deficits    _________________________________________________  LABS:                        11.6   7.84  )-----------( 346      ( 21 Sep 2022 07:51 )             35.8     09-21    143  |  110<H>  |  3<L>  ----------------------------<  86  3.3<L>   |  29  |  0.69    Ca    8.4      21 Sep 2022 07:51    TPro  5.9<L>  /  Alb  1.7<L>  /  TBili  0.6  /  DBili  x   /  AST  70<H>  /  ALT  106<H>  /  AlkPhos  186<H>  09-21    PT/INR - ( 21 Sep 2022 07:51 )   PT: 12.9 sec;   INR: 1.08 ratio         PTT - ( 21 Sep 2022 07:51 )  PTT:32.7 sec    CAPILLARY BLOOD GLUCOSE    RADIOLOGY & ADDITIONAL TESTS:  < from: MR MRCP No Cont (09.19.22 @ 13:31) >    ACC: [id number] EXAM:  MR MRCP                          PROCEDURE DATE:  09/19/2022          INTERPRETATION:  CLINICAL INFORMATION: Abdominal pain. Pancreatitis.   Cholelithiasis.    COMPARISON: No prior abdominal MR is available for comparison. Reference   is made with a previous abdominal CT dated 9/17/2022    CONTRAST/COMPLICATIONS:  IV Contrast: NONE  Oral Contrast: NONE  Complications: None reported at time of study completion    PROCEDURE:  MRI of the abdomen was performed.  MRCP was performed.    FINDINGS:  LOWER CHEST: Within normal limits.    LIVER: Within normal limits.  BILE DUCTS: Normal caliber. The common bile duct measures 6 mm in caliber   which is within normal limits. No evidence for choledocholithiasis.  GALLBLADDER: Small gallstones in the dependent gallbladder. Small   pericholecystic fluid and/or mild gallbladder wall thickening/edema.   Findings may represent acute cholecystitis. If clinically indicated, HIDA   scan may be pursued for further evaluation.  SPLEEN: Within normal limits.  PANCREAS: Mild edema adjacent to the pancreatic tail, suggestive of acute   pancreatitis. Clinical correlation with pancreatic enzymes is   recommended. The main pancreatic duct maintains normal caliber without   dilatation.  ADRENALS: Within normal limits.  KIDNEYS/URETERS: Small brightly T2 hyperintense lesion in the right   kidney, representing a probable cyst. The left kidney appears   unremarkable.    VISUALIZED PORTIONS:  BOWEL: Colonic diverticulosis.  PERITONEUM: No ascites.  VESSELS: Within normal limits.  RETROPERITONEUM/LYMPH NODES: No lymphadenopathy.  ABDOMINAL WALL: Small fat-containing umbilical hernia.  BONES: Within normal limits.    IMPRESSION: The common bile duct measures 6 mm in caliber which is within  normal limits. No evidence for choledocholithiasis.    Cholelithiasis. Small pericholecystic fluid and/or mild gallbladder wall   thickening/edema. Findings may represent acute cholecystitis. If   clinically indicated, HIDA scan may be pursued for further evaluation.    Mild edema adjacent to the pancreatic tail, suggestive of acute   pancreatitis. Clinical correlation with pancreatic enzymes is recommended.    --- End of Report --      KEYANNA MATOS MD; Attending Radiologist  This document has been electronically signed. Sep 19 2022  2:38PM    < end of copied text >    Imaging  Reviewed:  YES     Consultant(s) Notes Reviewed:   YES       Plan of care was discussed with patient; all questions and concerns were addressed

## 2022-09-21 NOTE — PROGRESS NOTE ADULT - SUBJECTIVE AND OBJECTIVE BOX
Surgery    Subjective:  Pt resting comfortably. No acute complaints.  Tolerating pain with meds.  Tolerating clear liquid diet  Denies N/V  Voiding copiously post op.    T(C): 37.5 (09-21-22 @ 14:56), Max: 37.5 (09-21-22 @ 14:56)  HR: 64 (09-21-22 @ 14:56) (48 - 73)  BP: 163/75 (09-21-22 @ 14:56) (133/64 - 166/73)  RR: 18 (09-21-22 @ 14:56) (16 - 20)  SpO2: 96% (09-21-22 @ 14:56) (95% - 100%)    Physical:  Gen: A&O x3  Abd: Soft ND, Dressing C/D/I

## 2022-09-21 NOTE — PROGRESS NOTE ADULT - ASSESSMENT
77y.o. Female s/p lap daija    -Advance to soft diet as tolerated  -Pain control prn  -DVT ppx  -Incentive spirometry  -May shower. Remove dressing in 48 hrs. Leave steristrips intact. Resume regular diet. No heavy lifting, straining, exercises for 2 weeks.   -Outpt f/u with Dr. Iqbal in 2 weeks. 77y.o. Female s/p lap daija    -Advance to soft diet as tolerated  -Pain control prn  -DVT ppx  -Incentive spirometry  -No further abx indicated post op  -Resume all home meds  -Resume care per primary team.  -May shower. Remove dressing in 48 hrs. Leave steristrips intact. Resume regular diet. No heavy lifting, straining, exercises for 2 weeks.   -Outpt f/u with Dr. Iqbal in 2 weeks. 77y.o. Female s/p lap daija    -Advance to soft diet as tolerated  -Pain control prn  -DVT ppx  -Incentive spirometry  -Complete abx course per ID rec  -Resume all home meds  -Resume care per primary team.  -May shower. Remove dressing in 48 hrs. Leave steristrips intact. Resume regular diet. No heavy lifting, straining, exercises for 2 weeks.   -Outpt f/u with Dr. Iqbal in 2 weeks.

## 2022-09-21 NOTE — PROGRESS NOTE ADULT - PROBLEM SELECTOR PLAN 2
- MRCP consistent with acute pancreatitis   - cont IV Zosyn   - cont clear liquid diet  - cont IVF   -OR today 9/21 for lap daija   - GI Dr. Barker following

## 2022-09-21 NOTE — PROGRESS NOTE ADULT - ASSESSMENT
Patient is a 77 year old female, baseline AAOx2~3, with PMHx of hypothyroidism, dementia, and sarcoidosis, who comes in with 1 day history of altered mental status. Admitted for gallstone pancreatitis. Surgery consulted recommended lap daija after acute pancreatitis is resolved. GI on board, MRCP completed and c/f for acute cholecystitis and acute pancreatitis Hospital course c/b E. coli bacteremia Ucx also + E.coli and was continued on Zosyn, ID Dr. Quispe following. Repeat blood cultures 9/20 NGTD. OR today 9/21 lap daija for acute daija.

## 2022-09-21 NOTE — PROGRESS NOTE ADULT - SUBJECTIVE AND OBJECTIVE BOX
INTERVAL HPI/OVERNIGHT EVENTS:    Pt seen and examined at bedside. Offers no acute complaints at this time.     Vital Signs Last 24 Hrs  T(C): 36.4 (21 Sep 2022 05:10), Max: 36.5 (20 Sep 2022 20:47)  T(F): 97.5 (21 Sep 2022 05:10), Max: 97.7 (20 Sep 2022 20:47)  HR: 49 (21 Sep 2022 05:10) (49 - 57)  BP: 134/58 (21 Sep 2022 05:10) (134/58 - 140/57)  BP(mean): --  RR: 17 (21 Sep 2022 05:10) (17 - 17)  SpO2: 95% (21 Sep 2022 05:10) (95% - 100%)    Parameters below as of 21 Sep 2022 05:10  Patient On (Oxygen Delivery Method): room air      I&O's Detail    piperacillin/tazobactam IVPB.. 3.375 Gram(s) IV Intermittent every 8 hours      Physical Exam  General: Alert, No acute distress  Skin: No jaundice, no icterus  Abdomen: soft,  nondistended, nontender, no rebound tenderness, no guarding, no palpable masses  Extremities: non edematous, no calf pain bilaterally      Labs:                        11.5   8.76  )-----------( 358      ( 20 Sep 2022 09:25 )             35.6     09-20    141  |  107  |  3<L>  ----------------------------<  87  3.0<L>   |  27  |  0.66    Ca    8.3<L>      20 Sep 2022 09:25  Phos  2.4     09-19  Mg     1.9     09-19    TPro  6.3  /  Alb  1.8<L>  /  TBili  0.8  /  DBili  x   /  AST  104<H>  /  ALT  146<H>  /  AlkPhos  223<H>  09-20

## 2022-09-22 LAB
ALBUMIN SERPL ELPH-MCNC: 1.8 G/DL — LOW (ref 3.5–5)
ALP SERPL-CCNC: 173 U/L — HIGH (ref 40–120)
ALT FLD-CCNC: 86 U/L DA — HIGH (ref 10–60)
ANION GAP SERPL CALC-SCNC: 8 MMOL/L — SIGNIFICANT CHANGE UP (ref 5–17)
AST SERPL-CCNC: 56 U/L — HIGH (ref 10–40)
BASOPHILS # BLD AUTO: 0.08 K/UL — SIGNIFICANT CHANGE UP (ref 0–0.2)
BASOPHILS NFR BLD AUTO: 0.9 % — SIGNIFICANT CHANGE UP (ref 0–2)
BILIRUB SERPL-MCNC: 0.5 MG/DL — SIGNIFICANT CHANGE UP (ref 0.2–1.2)
BUN SERPL-MCNC: 2 MG/DL — LOW (ref 7–18)
CALCIUM SERPL-MCNC: 7.9 MG/DL — LOW (ref 8.4–10.5)
CHLORIDE SERPL-SCNC: 108 MMOL/L — SIGNIFICANT CHANGE UP (ref 96–108)
CO2 SERPL-SCNC: 25 MMOL/L — SIGNIFICANT CHANGE UP (ref 22–31)
CREAT SERPL-MCNC: 0.67 MG/DL — SIGNIFICANT CHANGE UP (ref 0.5–1.3)
EGFR: 90 ML/MIN/1.73M2 — SIGNIFICANT CHANGE UP
EOSINOPHIL # BLD AUTO: 0.41 K/UL — SIGNIFICANT CHANGE UP (ref 0–0.5)
EOSINOPHIL NFR BLD AUTO: 4.5 % — SIGNIFICANT CHANGE UP (ref 0–6)
GLUCOSE SERPL-MCNC: 110 MG/DL — HIGH (ref 70–99)
HCT VFR BLD CALC: 36.1 % — SIGNIFICANT CHANGE UP (ref 34.5–45)
HGB BLD-MCNC: 11.6 G/DL — SIGNIFICANT CHANGE UP (ref 11.5–15.5)
IMM GRANULOCYTES NFR BLD AUTO: 1 % — HIGH (ref 0–0.9)
LYMPHOCYTES # BLD AUTO: 2.75 K/UL — SIGNIFICANT CHANGE UP (ref 1–3.3)
LYMPHOCYTES # BLD AUTO: 30 % — SIGNIFICANT CHANGE UP (ref 13–44)
MCHC RBC-ENTMCNC: 29.5 PG — SIGNIFICANT CHANGE UP (ref 27–34)
MCHC RBC-ENTMCNC: 32.1 GM/DL — SIGNIFICANT CHANGE UP (ref 32–36)
MCV RBC AUTO: 91.9 FL — SIGNIFICANT CHANGE UP (ref 80–100)
MONOCYTES # BLD AUTO: 0.82 K/UL — SIGNIFICANT CHANGE UP (ref 0–0.9)
MONOCYTES NFR BLD AUTO: 9 % — SIGNIFICANT CHANGE UP (ref 2–14)
NEUTROPHILS # BLD AUTO: 5.01 K/UL — SIGNIFICANT CHANGE UP (ref 1.8–7.4)
NEUTROPHILS NFR BLD AUTO: 54.6 % — SIGNIFICANT CHANGE UP (ref 43–77)
NRBC # BLD: 0 /100 WBCS — SIGNIFICANT CHANGE UP (ref 0–0)
PLATELET # BLD AUTO: 326 K/UL — SIGNIFICANT CHANGE UP (ref 150–400)
POTASSIUM SERPL-MCNC: 3.5 MMOL/L — SIGNIFICANT CHANGE UP (ref 3.5–5.3)
POTASSIUM SERPL-SCNC: 3.5 MMOL/L — SIGNIFICANT CHANGE UP (ref 3.5–5.3)
PROT SERPL-MCNC: 6 G/DL — SIGNIFICANT CHANGE UP (ref 6–8.3)
RBC # BLD: 3.93 M/UL — SIGNIFICANT CHANGE UP (ref 3.8–5.2)
RBC # FLD: 15.9 % — HIGH (ref 10.3–14.5)
SARS-COV-2 RNA SPEC QL NAA+PROBE: SIGNIFICANT CHANGE UP
SODIUM SERPL-SCNC: 141 MMOL/L — SIGNIFICANT CHANGE UP (ref 135–145)
WBC # BLD: 9.16 K/UL — SIGNIFICANT CHANGE UP (ref 3.8–10.5)
WBC # FLD AUTO: 9.16 K/UL — SIGNIFICANT CHANGE UP (ref 3.8–10.5)

## 2022-09-22 RX ORDER — PIPERACILLIN AND TAZOBACTAM 4; .5 G/20ML; G/20ML
3.38 INJECTION, POWDER, LYOPHILIZED, FOR SOLUTION INTRAVENOUS EVERY 8 HOURS
Refills: 0 | Status: DISCONTINUED | OUTPATIENT
Start: 2022-09-23 | End: 2022-09-23

## 2022-09-22 RX ADMIN — PANTOPRAZOLE SODIUM 40 MILLIGRAM(S): 20 TABLET, DELAYED RELEASE ORAL at 11:24

## 2022-09-22 RX ADMIN — ENOXAPARIN SODIUM 40 MILLIGRAM(S): 100 INJECTION SUBCUTANEOUS at 11:23

## 2022-09-22 RX ADMIN — Medication 88 MICROGRAM(S): at 05:11

## 2022-09-22 NOTE — PROGRESS NOTE ADULT - SUBJECTIVE AND OBJECTIVE BOX
77y Female is under our care for bacteremia with E. Coli, acute cholecystitis, UTI and ? colitis. Patient underwent lap partial cholecystectomy yesterday. Abscess culture is testing. At present, she is doing well and is only c/o mild incisional pain. Repeat BC are negative so far. Remains afebrile with normal wbc count.     MEDS:  piperacillin/tazobactam IVPB.. 3.375 Gram(s) IV Intermittent every 8 hours    ALLERGIES: Allergies    No Known Allergies    Intolerances    REVIEW OF SYSTEMS:  [  ] Not able to illicit  General: no fevers no malaise +generalized aches  Chest: no cough no sob  GI: no nvd  : no urinary sxs   Skin: no rashes  Musculoskeletal: no trauma no LBP  Neuro: no ha's no dizziness 	    VITALS:  Vital Signs Last 24 Hrs  T(C): 36.8 (09-22-22 @ 12:55), Max: 37.5 (09-21-22 @ 14:56)  T(F): 98.3 (09-22-22 @ 12:55), Max: 99.5 (09-21-22 @ 14:56)  HR: 52 (09-22-22 @ 12:55) (52 - 65)  BP: 113/56 (09-22-22 @ 12:55) (113/56 - 163/75)  BP(mean): --  RR: 17 (09-22-22 @ 12:55) (17 - 18)  SpO2: 100% (09-22-22 @ 12:55) (94% - 100%)    PHYSICAL EXAM:  HEENT: n/a  Neck: supple no LN's   Respiratory: lungs clear, but raspy no rales  Cardiovascular: S1 S2 reg no murmurs  Gastrointestinal: +BS with soft, nondistended abdomen; mild tenderness of upper mid abdominal section  Extremities: no edema  Skin: no rashes  Ortho: n/a  Neuro: awake and alert    LABS/DIAGNOSTIC TESTS:                         11.6   9.16  )-----------( 326      ( 22 Sep 2022 11:00 )             36.1   WBC Count: 9.16 K/uL (09-22 @ 11:00)  WBC Count: 7.84 K/uL (09-21 @ 07:51)  WBC Count: 8.76 K/uL (09-20 @ 09:25)  WBC Count: 12.14 K/uL (09-19 @ 10:30)  WBC Count: 16.08 K/uL (09-18 @ 05:20)    09-22    141  |  108  |  2<L>  ----------------------------<  110<H>  3.5   |  25  |  0.67    Ca    7.9<L>      22 Sep 2022 11:00    TPro  6.0  /  Alb  1.8<L>  /  TBili  0.5  /  DBili  x   /  AST  56<H>  /  ALT  86<H>  /  AlkPhos  173<H>  09-22        CULTURES:   9/21 abscess culture - pending     .Blood Blood-Peripheral  09-20 @ 09:25   No growth to date.  --  --      .Blood Blood-Peripheral  09-20 @ 08:40   No growth to date.  --  --      Clean Catch Clean Catch (Midstream)  09-17 @ 17:40   >100,000 CFU/ml Escherichia coli  --  Escherichia coli      .Blood Blood-Peripheral  09-17 @ 16:35   Growth in aerobic and anaerobic bottles: Escherichia coli  See previous culture [id number]  --    Growth in aerobic bottle: Gram Negative Rods  Growth in anaerobic bottle: Gram Negative Rods      .Blood Blood-Peripheral  09-17 @ 16:30   Growth in aerobic and anaerobic bottles: Escherichia coli  ***Blood Panel PCR results on this specimen are available  approximately 3 hours after the Gram stain result.***  Gram stain, PCR, and/or culture results may not always  correspond due to difference in methodologies.  ************************************************************  This PCR assay was performed by multiplex PCR. This  Assay tests for 66 bacterial and resistance gene targets.  Please refer to the [facility] test directory  at [email or web address] for details.  --  Blood Culture PCR  Escherichia coli        RADIOLOGY:  no new studies 77y Female is under our care for bacteremia with E. Coli, acute cholecystitis, UTI and ? colitis. Patient underwent lap cholecystectomy yesterday. At present, she is doing well and is only c/o mild incisional pain. Repeat BC are negative so far. Remains afebrile with normal wbc count.     MEDS:  piperacillin/tazobactam IVPB.. 3.375 Gram(s) IV Intermittent every 8 hours    ALLERGIES: Allergies    No Known Allergies    Intolerances    REVIEW OF SYSTEMS:  [  ] Not able to illicit  General: no fevers no malaise +generalized aches  Chest: no cough no sob  GI: no nvd  : no urinary sxs   Skin: no rashes  Musculoskeletal: no trauma no LBP  Neuro: no ha's no dizziness 	    VITALS:  Vital Signs Last 24 Hrs  T(C): 36.8 (09-22-22 @ 12:55), Max: 37.5 (09-21-22 @ 14:56)  T(F): 98.3 (09-22-22 @ 12:55), Max: 99.5 (09-21-22 @ 14:56)  HR: 52 (09-22-22 @ 12:55) (52 - 65)  BP: 113/56 (09-22-22 @ 12:55) (113/56 - 163/75)  BP(mean): --  RR: 17 (09-22-22 @ 12:55) (17 - 18)  SpO2: 100% (09-22-22 @ 12:55) (94% - 100%)    PHYSICAL EXAM:  HEENT: n/a  Neck: supple no LN's   Respiratory: lungs clear, but raspy no rales  Cardiovascular: S1 S2 reg no murmurs  Gastrointestinal: +BS with soft, nondistended abdomen; mild tenderness of upper mid abdominal section  Extremities: no edema  Skin: no rashes  Ortho: n/a  Neuro: awake and alert    LABS/DIAGNOSTIC TESTS:                         11.6   9.16  )-----------( 326      ( 22 Sep 2022 11:00 )             36.1   WBC Count: 9.16 K/uL (09-22 @ 11:00)  WBC Count: 7.84 K/uL (09-21 @ 07:51)  WBC Count: 8.76 K/uL (09-20 @ 09:25)  WBC Count: 12.14 K/uL (09-19 @ 10:30)  WBC Count: 16.08 K/uL (09-18 @ 05:20)    09-22    141  |  108  |  2<L>  ----------------------------<  110<H>  3.5   |  25  |  0.67    Ca    7.9<L>      22 Sep 2022 11:00    TPro  6.0  /  Alb  1.8<L>  /  TBili  0.5  /  DBili  x   /  AST  56<H>  /  ALT  86<H>  /  AlkPhos  173<H>  09-22        CULTURES:   .Blood Blood-Peripheral  09-20 @ 09:25   No growth to date.  --  --      .Blood Blood-Peripheral  09-20 @ 08:40   No growth to date.  --  --      Clean Catch Clean Catch (Midstream)  09-17 @ 17:40   >100,000 CFU/ml Escherichia coli  --  Escherichia coli      .Blood Blood-Peripheral  09-17 @ 16:35   Growth in aerobic and anaerobic bottles: Escherichia coli  See previous culture [id number]  --    Growth in aerobic bottle: Gram Negative Rods  Growth in anaerobic bottle: Gram Negative Rods      .Blood Blood-Peripheral  09-17 @ 16:30   Growth in aerobic and anaerobic bottles: Escherichia coli  ***Blood Panel PCR results on this specimen are available  approximately 3 hours after the Gram stain result.***  Gram stain, PCR, and/or culture results may not always  correspond due to difference in methodologies.  ************************************************************  This PCR assay was performed by multiplex PCR. This  Assay tests for 66 bacterial and resistance gene targets.  Please refer to the [facility] test directory  at [email or web address] for details.  --  Blood Culture PCR  Escherichia coli        RADIOLOGY:  no new studies

## 2022-09-22 NOTE — PROGRESS NOTE ADULT - PROBLEM SELECTOR PLAN 10
-discharge disposition back home pending surgical recs and repeat blood cultures
-discharge disposition back home pending ID reccs after repeat blood cultures and abscess culture from 9/21 results.
-discharge disposition back home pending surgical recs and repeat blood cultures
-discharge disposition back home pending surgical recs and repeat blood cultures

## 2022-09-22 NOTE — PROGRESS NOTE ADULT - SUBJECTIVE AND OBJECTIVE BOX
[   ] ICU                                          [   ] CCU                                      [  X ] Medical Floor    Patient is a 77 year old female with abdominal pain. GI consulted to evaluate.        Patient is a 77 year old female, ambulatory with wheelchair, baseline AAOx2~3, lives with granddaughter & grandson, with past medical history significant for hypothyroidism, dementia, and sarcoidosis, presented with altered mental status. Patient c/o 2 days history of intermittent, sharp, 4/10 intensity epigastric abdominal pain radiating to her back associated with nausea. Patient denies hematemesis, hematochezia, melena, fever, chills, chest pain, SOB, cough, hematuria, dysuria or diarrhea.      Patient appears comfortable. No new complaints reported, No abdominal pain, N/V, hematemesis, hematochezia, melena, fever, chills, chest pain, SOB, cough or diarrhea reported.      PAIN MANAGEMENT:  Pain Scale:                4 /10  Pain Location:  Epigastric abdominal pain      Prior Colonoscopy:  No prior colonoscopy      PAST MEDICAL HISTORY  Dementia  Hypothyroidism  Sarcoidosis        PAST SURGICAL HISTORY  No significant past surgical history        Allergies    No Known Allergies    Intolerances  None       MEDICATIONS  (STANDING):  heparin   Injectable 5000 Unit(s) SubCutaneous every 8 hours  lactated ringers. 1000 milliLiter(s) (100 mL/Hr) IV Continuous <Continuous>  levothyroxine Injectable 66 MICROGram(s) IV Push at bedtime  pantoprazole  Injectable 40 milliGRAM(s) IV Push daily  piperacillin/tazobactam IVPB. 3.375 Gram(s) IV Intermittent once  piperacillin/tazobactam IVPB.- 3.375 Gram(s) IV Intermittent once    MEDICATIONS  (PRN):  ondansetron Injectable 4 milliGRAM(s) IV Push every 6 hours PRN Nausea and/or Vomiting      SOCIAL HISTORY  Advanced Directives:       [ X ] Full Code       [  ] DNR  Marital Status:         [  ] M      [ X ] S      [  ] D       [  ] W  Children:       [ X ] Yes      [  ] No  Occupation:        [  ] Employed       [ X ] Unemployed       [  ] Retired  Diet:       [ X ] Regular       [  ] PEG feeding          [  ] NG tube feeding  Drug Use:           [  ] Patient denied          [  ] Yes  Alcohol:           [ X ] No             [  ] Yes (socially)         [  ] Yes (chronic)  Tobacco:           [  ] Yes           [ X ] No      FAMILY HISTORY  [ X ] Heart Disease            [ X ] Diabetes             [X  ] HTN             [  ] Colon Cancer             [  ] Stomach Cancer              [  ] Pancreatic Cancer      VITALS  Vital Signs Last 24 Hrs  T(C): 36.8 (22 Sep 2022 12:55), Max: 37.5 (21 Sep 2022 14:56)  T(F): 98.3 (22 Sep 2022 12:55), Max: 99.5 (21 Sep 2022 14:56)  HR: 52 (22 Sep 2022 12:55) (52 - 65)  BP: 113/56 (22 Sep 2022 12:55) (113/56 - 163/75)   RR: 17 (22 Sep 2022 12:55) (17 - 18)  SpO2: 100% (22 Sep 2022 12:55) (94% - 100%)  Parameters below as of 22 Sep 2022 12:55  Patient On (Oxygen Delivery Method): room air         MEDICATIONS  (STANDING):  enoxaparin Injectable 40 milliGRAM(s) SubCutaneous every 24 hours  levothyroxine 88 MICROGram(s) Oral daily  pantoprazole  Injectable 40 milliGRAM(s) IV Push daily  sodium chloride 0.9%. 1000 milliLiter(s) (110 mL/Hr) IV Continuous <Continuous>    MEDICATIONS  (PRN):  acetaminophen     Tablet .. 650 milliGRAM(s) Oral every 6 hours PRN Temp greater or equal to 38C (100.4F), Mild Pain (1 - 3)  morphine  - Injectable 2 milliGRAM(s) IV Push every 6 hours PRN Severe Pain (7 - 10)  OLANZapine Injectable 2.5 milliGRAM(s) IntraMuscular every 6 hours PRN Agitation  ondansetron Injectable 4 milliGRAM(s) IV Push every 6 hours PRN Nausea  oxycodone    5 mG/acetaminophen 325 mG 1 Tablet(s) Oral every 6 hours PRN Moderate Pain (4 - 6)                            11.6   9.16  )-----------( 326      ( 22 Sep 2022 11:00 )             36.1       09-22    141  |  108  |  2<L>  ----------------------------<  110<H>  3.5   |  25  |  0.67    Ca    7.9<L>      22 Sep 2022 11:00    TPro  6.0  /  Alb  1.8<L>  /  TBili  0.5  /  DBili  x   /  AST  56<H>  /  ALT  86<H>  /  AlkPhos  173<H>  09-22      PT/INR - ( 21 Sep 2022 07:51 )   PT: 12.9 sec;   INR: 1.08 ratio         PTT - ( 21 Sep 2022 07:51 )  PTT:32.7 sec

## 2022-09-22 NOTE — PROGRESS NOTE ADULT - ASSESSMENT
77 year old female s/p laparoscopic cholecystectomy POD#1    - diet as tolerated  - may remove outer dressings and shower in 24 hours  - keep Steri-strips in place  - outpatient follow up with Dr. Iqbal in 1-2 weeks  - pain control PRN   - patient stable for discharge from general surgery  - reconsult as needed  - Discussed with Dr. Iqbal

## 2022-09-22 NOTE — PROGRESS NOTE ADULT - ASSESSMENT
Bacteremia with E. Coli  Acute Cholecystitis - lap partial cholecystectomy  UTI  ? Colitis - as per CT    Plan:  ·	Cont Zosyn 3.375 mgs iv q8hrs for now  (D6 of all antibiotics)  ·	awaiting abscess culture results  Bacteremia with E. Coli  Acute Cholecystitis - lap cholecystectomy  UTI  ? Colitis - as per CT    Plan:  ·	dc planning tomorrow  ·	Cont Zosyn 3.375 mgs iv q8hrs for now  (D6 of all antibiotics)  ·	upon dc can be switched to Augmentin 875mg PO BID for x 7 days  ·	Reconsult prn

## 2022-09-22 NOTE — PROGRESS NOTE ADULT - ASSESSMENT
1. Abdominal Pain  2. Gall stone pancreatitis  3. Cholecystitis  4. Right colonic wall thickening  5. R/o colonic mass  6. R/o Colitis  7. S/p cholecystectomy     Suggestions:    1. Diet as per surgery  2. IVF hydration  3. Protonix daily  4. Colonoscopy  5. Surgical follow up  6. Antibiotics IV  7. Check CEA  8. Colonoscopy  9. Follow up LFT's  10. DVT prophylaxis

## 2022-09-22 NOTE — PROGRESS NOTE ADULT - NS ATTEND AMEND GEN_ALL_CORE FT
77F, ambulatory with wheelchair, baseline AAOx2~3, with PMHx of hypothyroidism, dementia, and sarcoidosis, who comes in with 1 day history of altered mental status. Admitted for gallstone pancreatitis. Also has e. coli bacteremia and cholecystitis.  Pancreatitis is resolving  - OR today for cholecystectomy  - Continue Zosyn  - f/u surgery  -ID following for bacteremia  - DVT ppx
patient was seen and examined at bedside   Denies any new complains     ICU Vital Signs Last 24 Hrs  T(C): 36.5 (19 Sep 2022 11:40), Max: 37.2 (18 Sep 2022 23:58)  T(F): 97.7 (19 Sep 2022 11:40), Max: 98.9 (18 Sep 2022 23:58)  HR: 53 (19 Sep 2022 11:40) (53 - 62)  BP: 130/71 (19 Sep 2022 11:40) (124/57 - 131/68)  BP(mean): --  ABP: --  ABP(mean): --  RR: 16 (19 Sep 2022 11:40) (16 - 18)  SpO2: 98% (19 Sep 2022 11:40) (97% - 99%)    O2 Parameters below as of 19 Sep 2022 11:40  Patient On (Oxygen Delivery Method): room air    P/E:  NAD  AAOx2, relative LE weakness then UE (chronic as per patient, uses darden to walk)  CTABL  S1S2 WNL  Abd soft, non tender, BS present   BL LE no edema or calf tenderness     labs noted     A/P:  E.coli bacteremia   Acute pancreatitis   Transaminitis   Dementia   Hypothyroidism   Hypokalemia   Hypophosphatemia       Plan:   Will cont Dr. Jose Enrique Che following   MRCP noted   Transaminitis improving   Surgery and GI following   Monitor electrolytes   Cont home dose levothyroxine   Denies any urinary symptoms   PT eval   Rest of the management as above
I agree with above
I agree with above
77F, ambulatory with wheelchair, baseline AAOx2~3, with PMHx of hypothyroidism, dementia, and sarcoidosis, who comes in with 1 day history of altered mental status. Admitted for gallstone pancreatitis. Also has e. coli bacteremia and cholecystitis.  Pancreatitis is resolving  - s/p cholecystectomy  - Continue Zosyn - f/u abscess cultures and repeat cultures  - ID recs pending culture results for outpatient Abx regiemn  - f/u surgery  - DVT ppx

## 2022-09-22 NOTE — PROGRESS NOTE ADULT - PROBLEM SELECTOR PLAN 2
- MRCP consistent with acute pancreatitis   -POD#1 s/p lap daija   - cont IV Zosyn   - diet as tolerated  - may remove outer dressings and shower in 24 hours  - keep Steri-strips in place  - outpatient follow up with Dr. Iqbal in 1-2 weeks  - pain control PRN   - patient stable for discharge from general surgery  - GI Dr. Barker following

## 2022-09-22 NOTE — PROGRESS NOTE ADULT - ASSESSMENT
Patient is a 77 year old female, baseline AAOx2~3, with PMHx of hypothyroidism, dementia, and sarcoidosis, who comes in with 1 day history of altered mental status. Admitted for gallstone pancreatitis. Surgery consulted recommended lap daija after acute pancreatitis is resolved. GI on board, MRCP completed and c/f for acute cholecystitis and acute pancreatitis Hospital course c/b E. coli bacteremia Ucx also + E.coli and was continued on Zosyn, ID Dr. Quispe following. Repeat blood cultures 9/20 NGTD. s/p  9/21 s/p laparoscopic cholecystectomy, Abscess culture pending, continues on IV abt zosyn until resulted.

## 2022-09-22 NOTE — PROGRESS NOTE ADULT - SUBJECTIVE AND OBJECTIVE BOX
s/p laparoscopic cholecystectomy POD#1  Patient seen and examined at bedside with no complaints.   Admits to mild pain.  Denies nausea/ vomiting.   Tolerating diet.    Vital Signs Last 24 Hrs  T(F): 97.9 (09-22-22 @ 05:40), Max: 99.5 (09-21-22 @ 14:56)  HR: 53 (09-22-22 @ 05:40)  BP: 126/57 (09-22-22 @ 05:40)  RR: 18 (09-22-22 @ 05:40)  SpO2: 94% (09-22-22 @ 05:40)    GENERAL: Alert, NAD  CHEST/LUNG: respirations nonlabored  ABDOMEN: Steri-strips over port sites c/d/i x 4; soft, appropriate tenderness to palpation, Nondistended  EXTREMITIES:  no calf tenderness, No edema    I&O's Detail    LABS:                        11.6   7.84  )-----------( 346      ( 21 Sep 2022 07:51 )             35.8     09-21    143  |  110<H>  |  3<L>  ----------------------------<  86  3.3<L>   |  29  |  0.69    Ca    8.4      21 Sep 2022 07:51    TPro  5.9<L>  /  Alb  1.7<L>  /  TBili  0.6  /  DBili  x   /  AST  70<H>  /  ALT  106<H>  /  AlkPhos  186<H>  09-21    PT/INR - ( 21 Sep 2022 07:51 )   PT: 12.9 sec;   INR: 1.08 ratio      PTT - ( 21 Sep 2022 07:51 )  PTT:32.7 sec

## 2022-09-22 NOTE — PROGRESS NOTE ADULT - PROBLEM SELECTOR PLAN 1
-E coli bacteremia likely intra abdominal source   -cont Zosyn   -f/u repeat blood cultures in AM   -ID dr. Quispe
- E coli bacteremia likely intra abdominal source   - cont IV Zosyn   - repeat blood cultures today 9/20  -ID dr. Quispe following
- E coli bacteremia, likely intra abdominal source   - cont IV Zosyn   - repeat blood cultures 9/20 NGTD   -ID dr. Quispe following
- E coli bacteremia, likely intra abdominal source   - cont IV Zosyn   - repeat blood cultures 9/20 NGTD   -f/u abscess culture from lap daija 9/21   -ID dr. Quispe following

## 2022-09-22 NOTE — PROGRESS NOTE ADULT - PROBLEM SELECTOR PROBLEM 1
Bacteremia due to Escherichia coli

## 2022-09-22 NOTE — PROGRESS NOTE ADULT - PROBLEM SELECTOR PLAN 7
- continue home dose Synthroid
-cont IV Levothyroxine   -change to home dose when tolerating PO

## 2022-09-22 NOTE — PROGRESS NOTE ADULT - SUBJECTIVE AND OBJECTIVE BOX
NP Note discussed with  Primary Attending    Patient is a 77y old  Female who presents with a chief complaint of Pancreatitis (22 Sep 2022 08:16)      INTERVAL HPI/OVERNIGHT EVENTS: no new complaints    MEDICATIONS  (STANDING):  enoxaparin Injectable 40 milliGRAM(s) SubCutaneous every 24 hours  levothyroxine 88 MICROGram(s) Oral daily  pantoprazole  Injectable 40 milliGRAM(s) IV Push daily  sodium chloride 0.9%. 1000 milliLiter(s) (110 mL/Hr) IV Continuous <Continuous>    MEDICATIONS  (PRN):  acetaminophen     Tablet .. 650 milliGRAM(s) Oral every 6 hours PRN Temp greater or equal to 38C (100.4F), Mild Pain (1 - 3)  morphine  - Injectable 2 milliGRAM(s) IV Push every 6 hours PRN Severe Pain (7 - 10)  OLANZapine Injectable 2.5 milliGRAM(s) IntraMuscular every 6 hours PRN Agitation  ondansetron Injectable 4 milliGRAM(s) IV Push every 6 hours PRN Nausea  oxycodone    5 mG/acetaminophen 325 mG 1 Tablet(s) Oral every 6 hours PRN Moderate Pain (4 - 6)      __________________________________________________  REVIEW OF SYSTEMS:    CONSTITUTIONAL: No fever,   EYES: no acute visual disturbances  NECK: No pain or stiffness  RESPIRATORY: No cough; No shortness of breath  CARDIOVASCULAR: No chest pain, no palpitations  GASTROINTESTINAL: No pain. No nausea or vomiting; No diarrhea   NEUROLOGICAL: No headache or numbness, no tremors  MUSCULOSKELETAL: No joint pain, no muscle pain  GENITOURINARY: no dysuria, no frequency, no hesitancy  PSYCHIATRY: no depression , no anxiety  ALL OTHER  ROS negative        Vital Signs Last 24 Hrs  T(C): 36.6 (22 Sep 2022 05:40), Max: 37.5 (21 Sep 2022 14:56)  T(F): 97.9 (22 Sep 2022 05:40), Max: 99.5 (21 Sep 2022 14:56)  HR: 53 (22 Sep 2022 05:40) (53 - 73)  BP: 126/57 (22 Sep 2022 05:40) (126/57 - 166/73)  BP(mean): 99 (21 Sep 2022 13:30) (84 - 99)  RR: 18 (22 Sep 2022 05:40) (16 - 20)  SpO2: 94% (22 Sep 2022 05:40) (94% - 100%)    Parameters below as of 22 Sep 2022 05:40  Patient On (Oxygen Delivery Method): room air        ________________________________________________  PHYSICAL EXAM:  GENERAL: NAD  HEENT: Normocephalic;  conjunctivae and sclerae clear; moist mucous membranes;   NECK : supple  CHEST/LUNG: Clear to auscultation bilaterally with good air entry   HEART: S1 S2  regular; no murmurs, gallops or rubs  ABDOMEN: Soft, Nontender, Nondistended; Bowel sounds present  EXTREMITIES: no cyanosis; no edema; no calf tenderness  SKIN: warm and dry; no rash  NERVOUS SYSTEM:  Awake and alert; Oriented  to place, person and time ; no new deficits    _________________________________________________  LABS:                        11.6   9.16  )-----------( 326      ( 22 Sep 2022 11:00 )             36.1     09-21    143  |  110<H>  |  3<L>  ----------------------------<  86  3.3<L>   |  29  |  0.69    Ca    8.4      21 Sep 2022 07:51    TPro  5.9<L>  /  Alb  1.7<L>  /  TBili  0.6  /  DBili  x   /  AST  70<H>  /  ALT  106<H>  /  AlkPhos  186<H>  09-21    PT/INR - ( 21 Sep 2022 07:51 )   PT: 12.9 sec;   INR: 1.08 ratio         PTT - ( 21 Sep 2022 07:51 )  PTT:32.7 sec    CAPILLARY BLOOD GLUCOSE            RADIOLOGY & ADDITIONAL TESTS:    Imaging  Reviewed:  YES/NO    Consultant(s) Notes Reviewed:   YES/ No      Plan of care was discussed with patient and /or primary care giver; all questions and concerns were addressed  NP Note discussed with  Primary Attending    Patient is a 77y old  Female who presents with a chief complaint of Pancreatitis (22 Sep 2022 08:16)      INTERVAL HPI/OVERNIGHT EVENTS: no new complaints    MEDICATIONS  (STANDING):  enoxaparin Injectable 40 milliGRAM(s) SubCutaneous every 24 hours  levothyroxine 88 MICROGram(s) Oral daily  pantoprazole  Injectable 40 milliGRAM(s) IV Push daily  sodium chloride 0.9%. 1000 milliLiter(s) (110 mL/Hr) IV Continuous <Continuous>    MEDICATIONS  (PRN):  acetaminophen     Tablet .. 650 milliGRAM(s) Oral every 6 hours PRN Temp greater or equal to 38C (100.4F), Mild Pain (1 - 3)  morphine  - Injectable 2 milliGRAM(s) IV Push every 6 hours PRN Severe Pain (7 - 10)  OLANZapine Injectable 2.5 milliGRAM(s) IntraMuscular every 6 hours PRN Agitation  ondansetron Injectable 4 milliGRAM(s) IV Push every 6 hours PRN Nausea  oxycodone    5 mG/acetaminophen 325 mG 1 Tablet(s) Oral every 6 hours PRN Moderate Pain (4 - 6)      __________________________________________________  REVIEW OF SYSTEMS:    CONSTITUTIONAL: No fever,   EYES: no acute visual disturbances  NECK: No pain or stiffness  RESPIRATORY: No cough; No shortness of breath  CARDIOVASCULAR: No chest pain, no palpitations  GASTROINTESTINAL: No pain. No nausea or vomiting; No diarrhea   NEUROLOGICAL: No headache or numbness, no tremors  MUSCULOSKELETAL: No joint pain, no muscle pain  GENITOURINARY: no dysuria, no frequency, no hesitancy  PSYCHIATRY: no depression , no anxiety  ALL OTHER  ROS negative        Vital Signs Last 24 Hrs  T(C): 36.6 (22 Sep 2022 05:40), Max: 37.5 (21 Sep 2022 14:56)  T(F): 97.9 (22 Sep 2022 05:40), Max: 99.5 (21 Sep 2022 14:56)  HR: 53 (22 Sep 2022 05:40) (53 - 73)  BP: 126/57 (22 Sep 2022 05:40) (126/57 - 166/73)  BP(mean): 99 (21 Sep 2022 13:30) (84 - 99)  RR: 18 (22 Sep 2022 05:40) (16 - 20)  SpO2: 94% (22 Sep 2022 05:40) (94% - 100%)    Parameters below as of 22 Sep 2022 05:40  Patient On (Oxygen Delivery Method): room air        ________________________________________________  PHYSICAL EXAM:  GENERAL: NAD  HEENT: Normocephalic;  conjunctivae and sclerae clear; moist mucous membranes;   NECK : supple  CHEST/LUNG: Clear to auscultation bilaterally with good air entry   HEART: S1 S2  regular; no murmurs, gallops or rubs  ABDOMEN: Soft, Nontender, Nondistended; Bowel sounds present. steri strips x 4 to abd c/d/i   EXTREMITIES: no cyanosis; no edema; no calf tenderness  SKIN: warm and dry; no rash  NERVOUS SYSTEM:  Awake and alert; Oriented  to place, person and time ; no new deficits    _________________________________________________  LABS:                        11.6   9.16  )-----------( 326      ( 22 Sep 2022 11:00 )             36.1     09-21    143  |  110<H>  |  3<L>  ----------------------------<  86  3.3<L>   |  29  |  0.69    Ca    8.4      21 Sep 2022 07:51    TPro  5.9<L>  /  Alb  1.7<L>  /  TBili  0.6  /  DBili  x   /  AST  70<H>  /  ALT  106<H>  /  AlkPhos  186<H>  09-21    PT/INR - ( 21 Sep 2022 07:51 )   PT: 12.9 sec;   INR: 1.08 ratio         PTT - ( 21 Sep 2022 07:51 )  PTT:32.7 sec    CAPILLARY BLOOD GLUCOSE    RADIOLOGY & ADDITIONAL TESTS:  < from: MR MRCP No Cont (09.19.22 @ 13:31) >    ACC: [id number] EXAM:  MR MRCP                          PROCEDURE DATE:  09/19/2022          INTERPRETATION:  CLINICAL INFORMATION: Abdominal pain. Pancreatitis.   Cholelithiasis.    COMPARISON: No prior abdominal MR is available for comparison. Reference   is made with a previous abdominal CT dated 9/17/2022    CONTRAST/COMPLICATIONS:  IV Contrast: NONE  Oral Contrast: NONE  Complications: None reported at time of study completion    PROCEDURE:  MRI of the abdomen was performed.  MRCP was performed.    FINDINGS:  LOWER CHEST: Within normal limits.    LIVER: Within normal limits.  BILE DUCTS: Normal caliber. The common bile duct measures 6 mm in caliber   which is within normal limits. No evidence for choledocholithiasis.  GALLBLADDER: Small gallstones in the dependent gallbladder. Small   pericholecystic fluid and/or mild gallbladder wall thickening/edema.   Findings may represent acute cholecystitis. If clinically indicated, HIDA   scan may be pursued for further evaluation.  SPLEEN: Within normal limits.  PANCREAS: Mild edema adjacent to the pancreatic tail, suggestive of acute   pancreatitis. Clinical correlation with pancreatic enzymes is   recommended. The main pancreatic duct maintains normal caliber without   dilatation.  ADRENALS: Within normal limits.  KIDNEYS/URETERS: Small brightly T2 hyperintense lesion in the right   kidney, representing a probable cyst. The left kidney appears   unremarkable.    VISUALIZED PORTIONS:  BOWEL: Colonic diverticulosis.  PERITONEUM: No ascites.  VESSELS: Within normal limits.  RETROPERITONEUM/LYMPH NODES: No lymphadenopathy.  ABDOMINAL WALL: Small fat-containing umbilical hernia.  BONES: Within normal limits.    IMPRESSION: The common bile duct measures 6 mm in caliber which is within  normal limits. No evidence for choledocholithiasis.    Cholelithiasis. Small pericholecystic fluid and/or mild gallbladder wall   thickening/edema. Findings may represent acute cholecystitis. If   clinically indicated, HIDA scan may be pursued for further evaluation.    Mild edema adjacent to the pancreatic tail, suggestive of acute   pancreatitis. Clinical correlation with pancreatic enzymes is recommended.    --- End of Report ---      KEYANNA MATOS MD; Attending Radiologist  This document has been electronically signed. Sep 19 2022  2:38PM    < end of copied text >    Imaging  Reviewed:  YES     Consultant(s) Notes Reviewed:   YES

## 2022-09-22 NOTE — PROGRESS NOTE ADULT - PROBLEM SELECTOR PLAN 6
- like 2/2 poor PO intake, supplemented  - replete as needed   - trend BMP
-due to poor PO intake, supplemented   -f/u repeat in AM

## 2022-09-22 NOTE — PROGRESS NOTE ADULT - PROBLEM SELECTOR PLAN 8
- resume home dose of  Donepezil when tolerating PO
-resume home dose of  Donepezil when tolerating PO
- resume home dose of  Donepezil when tolerating PO
- resume home dose of  Donepezil when tolerating PO

## 2022-09-23 VITALS
SYSTOLIC BLOOD PRESSURE: 127 MMHG | TEMPERATURE: 98 F | DIASTOLIC BLOOD PRESSURE: 57 MMHG | OXYGEN SATURATION: 97 % | RESPIRATION RATE: 14 BRPM | HEART RATE: 59 BPM

## 2022-09-23 LAB
ANION GAP SERPL CALC-SCNC: 7 MMOL/L — SIGNIFICANT CHANGE UP (ref 5–17)
BUN SERPL-MCNC: 1 MG/DL — LOW (ref 7–18)
CALCIUM SERPL-MCNC: 8.2 MG/DL — LOW (ref 8.4–10.5)
CHLORIDE SERPL-SCNC: 109 MMOL/L — HIGH (ref 96–108)
CO2 SERPL-SCNC: 27 MMOL/L — SIGNIFICANT CHANGE UP (ref 22–31)
CREAT SERPL-MCNC: 0.57 MG/DL — SIGNIFICANT CHANGE UP (ref 0.5–1.3)
EGFR: 94 ML/MIN/1.73M2 — SIGNIFICANT CHANGE UP
GLUCOSE SERPL-MCNC: 86 MG/DL — SIGNIFICANT CHANGE UP (ref 70–99)
HCT VFR BLD CALC: 38 % — SIGNIFICANT CHANGE UP (ref 34.5–45)
HGB BLD-MCNC: 12.2 G/DL — SIGNIFICANT CHANGE UP (ref 11.5–15.5)
MCHC RBC-ENTMCNC: 29.8 PG — SIGNIFICANT CHANGE UP (ref 27–34)
MCHC RBC-ENTMCNC: 32.1 GM/DL — SIGNIFICANT CHANGE UP (ref 32–36)
MCV RBC AUTO: 92.7 FL — SIGNIFICANT CHANGE UP (ref 80–100)
MRSA PCR RESULT.: SIGNIFICANT CHANGE UP
NRBC # BLD: 0 /100 WBCS — SIGNIFICANT CHANGE UP (ref 0–0)
PLATELET # BLD AUTO: 323 K/UL — SIGNIFICANT CHANGE UP (ref 150–400)
POTASSIUM SERPL-MCNC: 3.4 MMOL/L — LOW (ref 3.5–5.3)
POTASSIUM SERPL-SCNC: 3.4 MMOL/L — LOW (ref 3.5–5.3)
RBC # BLD: 4.1 M/UL — SIGNIFICANT CHANGE UP (ref 3.8–5.2)
RBC # FLD: 16 % — HIGH (ref 10.3–14.5)
S AUREUS DNA NOSE QL NAA+PROBE: SIGNIFICANT CHANGE UP
SODIUM SERPL-SCNC: 143 MMOL/L — SIGNIFICANT CHANGE UP (ref 135–145)
WBC # BLD: 9.55 K/UL — SIGNIFICANT CHANGE UP (ref 3.8–10.5)
WBC # FLD AUTO: 9.55 K/UL — SIGNIFICANT CHANGE UP (ref 3.8–10.5)

## 2022-09-23 PROCEDURE — 82962 GLUCOSE BLOOD TEST: CPT

## 2022-09-23 PROCEDURE — 36415 COLL VENOUS BLD VENIPUNCTURE: CPT

## 2022-09-23 PROCEDURE — 84478 ASSAY OF TRIGLYCERIDES: CPT

## 2022-09-23 PROCEDURE — 87040 BLOOD CULTURE FOR BACTERIA: CPT

## 2022-09-23 PROCEDURE — 83690 ASSAY OF LIPASE: CPT

## 2022-09-23 PROCEDURE — 85025 COMPLETE CBC W/AUTO DIFF WBC: CPT

## 2022-09-23 PROCEDURE — 80053 COMPREHEN METABOLIC PANEL: CPT

## 2022-09-23 PROCEDURE — 84484 ASSAY OF TROPONIN QUANT: CPT

## 2022-09-23 PROCEDURE — 85730 THROMBOPLASTIN TIME PARTIAL: CPT

## 2022-09-23 PROCEDURE — 96374 THER/PROPH/DIAG INJ IV PUSH: CPT

## 2022-09-23 PROCEDURE — 74181 MRI ABDOMEN W/O CONTRAST: CPT

## 2022-09-23 PROCEDURE — 81001 URINALYSIS AUTO W/SCOPE: CPT

## 2022-09-23 PROCEDURE — 87086 URINE CULTURE/COLONY COUNT: CPT

## 2022-09-23 PROCEDURE — 86850 RBC ANTIBODY SCREEN: CPT

## 2022-09-23 PROCEDURE — 76705 ECHO EXAM OF ABDOMEN: CPT

## 2022-09-23 PROCEDURE — 83880 ASSAY OF NATRIURETIC PEPTIDE: CPT

## 2022-09-23 PROCEDURE — 80048 BASIC METABOLIC PNL TOTAL CA: CPT

## 2022-09-23 PROCEDURE — 99285 EMERGENCY DEPT VISIT HI MDM: CPT | Mod: 25

## 2022-09-23 PROCEDURE — 85610 PROTHROMBIN TIME: CPT

## 2022-09-23 PROCEDURE — 84100 ASSAY OF PHOSPHORUS: CPT

## 2022-09-23 PROCEDURE — 86901 BLOOD TYPING SEROLOGIC RH(D): CPT

## 2022-09-23 PROCEDURE — 87635 SARS-COV-2 COVID-19 AMP PRB: CPT

## 2022-09-23 PROCEDURE — 87641 MR-STAPH DNA AMP PROBE: CPT

## 2022-09-23 PROCEDURE — 82009 KETONE BODYS QUAL: CPT

## 2022-09-23 PROCEDURE — 99239 HOSP IP/OBS DSCHRG MGMT >30: CPT

## 2022-09-23 PROCEDURE — 83605 ASSAY OF LACTIC ACID: CPT

## 2022-09-23 PROCEDURE — 0225U NFCT DS DNA&RNA 21 SARSCOV2: CPT

## 2022-09-23 PROCEDURE — 96375 TX/PRO/DX INJ NEW DRUG ADDON: CPT

## 2022-09-23 PROCEDURE — 83735 ASSAY OF MAGNESIUM: CPT

## 2022-09-23 PROCEDURE — 88304 TISSUE EXAM BY PATHOLOGIST: CPT

## 2022-09-23 PROCEDURE — 87186 SC STD MICRODIL/AGAR DIL: CPT

## 2022-09-23 PROCEDURE — 86900 BLOOD TYPING SEROLOGIC ABO: CPT

## 2022-09-23 PROCEDURE — 96376 TX/PRO/DX INJ SAME DRUG ADON: CPT

## 2022-09-23 PROCEDURE — 74177 CT ABD & PELVIS W/CONTRAST: CPT | Mod: MA

## 2022-09-23 PROCEDURE — 87640 STAPH A DNA AMP PROBE: CPT

## 2022-09-23 PROCEDURE — 93005 ELECTROCARDIOGRAM TRACING: CPT

## 2022-09-23 PROCEDURE — 87150 DNA/RNA AMPLIFIED PROBE: CPT

## 2022-09-23 PROCEDURE — 71045 X-RAY EXAM CHEST 1 VIEW: CPT

## 2022-09-23 PROCEDURE — 85027 COMPLETE CBC AUTOMATED: CPT

## 2022-09-23 PROCEDURE — C1889: CPT

## 2022-09-23 RX ORDER — POTASSIUM CHLORIDE 20 MEQ
20 PACKET (EA) ORAL
Refills: 0 | Status: COMPLETED | OUTPATIENT
Start: 2022-09-23 | End: 2022-09-23

## 2022-09-23 RX ORDER — PANTOPRAZOLE SODIUM 20 MG/1
1 TABLET, DELAYED RELEASE ORAL
Qty: 30 | Refills: 0
Start: 2022-09-23 | End: 2022-10-22

## 2022-09-23 RX ORDER — DONEPEZIL HYDROCHLORIDE 10 MG/1
1 TABLET, FILM COATED ORAL
Qty: 0 | Refills: 0 | DISCHARGE

## 2022-09-23 RX ORDER — DONEPEZIL HYDROCHLORIDE 10 MG/1
1 TABLET, FILM COATED ORAL
Qty: 30 | Refills: 0
Start: 2022-09-23 | End: 2022-10-22

## 2022-09-23 RX ORDER — LEVOTHYROXINE SODIUM 125 MCG
1 TABLET ORAL
Qty: 30 | Refills: 0
Start: 2022-09-23 | End: 2022-10-22

## 2022-09-23 RX ORDER — ACETAMINOPHEN 500 MG
2 TABLET ORAL
Qty: 0 | Refills: 0 | DISCHARGE
Start: 2022-09-23

## 2022-09-23 RX ADMIN — ENOXAPARIN SODIUM 40 MILLIGRAM(S): 100 INJECTION SUBCUTANEOUS at 11:49

## 2022-09-23 RX ADMIN — Medication 20 MILLIEQUIVALENT(S): at 14:14

## 2022-09-23 RX ADMIN — Medication 20 MILLIEQUIVALENT(S): at 12:58

## 2022-09-23 RX ADMIN — Medication 20 MILLIEQUIVALENT(S): at 11:48

## 2022-09-23 RX ADMIN — PIPERACILLIN AND TAZOBACTAM 25 GRAM(S): 4; .5 INJECTION, POWDER, LYOPHILIZED, FOR SOLUTION INTRAVENOUS at 13:48

## 2022-09-23 RX ADMIN — PIPERACILLIN AND TAZOBACTAM 25 GRAM(S): 4; .5 INJECTION, POWDER, LYOPHILIZED, FOR SOLUTION INTRAVENOUS at 05:23

## 2022-09-23 RX ADMIN — Medication 88 MICROGRAM(S): at 05:23

## 2022-09-23 RX ADMIN — PANTOPRAZOLE SODIUM 40 MILLIGRAM(S): 20 TABLET, DELAYED RELEASE ORAL at 11:49

## 2022-09-23 NOTE — DISCHARGE NOTE PROVIDER - CARE PROVIDER_API CALL
Oswald Barker)  Medicine  [address] [place] [address]  Phone: [phone]  Fax: [phone]  Follow Up Time: 2 weeks   Oswald Barker)  Medicine  [address] [facility], [address] [place] [address]  Phone: [phone]  Fax: [phone]  Follow Up Time: 2 weeks    Bharat Iqbal)  Surgery  [address] [place], [place] [id number]  Phone: [phone]  Fax: [phone]  Established Patient  Follow Up Time: 2 weeks

## 2022-09-23 NOTE — DISCHARGE NOTE PROVIDER - ATTENDING DISCHARGE PHYSICAL EXAMINATION:
PHYSICAL EXAM:  GENERAL: NAD, well-developed, lying in bed  HEAD:  Atraumatic, Normocephalic  EYES:  conjunctiva and sclera clear  NECK: Supple, No JVD  CHEST/LUNG: Clear to auscultation bilaterally; No wheeze, rhonchi, rales  HEART: Regular rate and rhythm; No murmurs, rubs, or gallops  ABDOMEN: Soft, Nontender, Nondistended; Bowel sounds present, 4 incision sites are c/d/i  EXTREMITIES:  2+ Peripheral Pulses, No clubbing, cyanosis, or edema  PSYCH: AAOx3, pleasant, cooperative  NEUROLOGY: non-focal  SKIN: No rashes or lesions, Steri-strips in place at surgical site

## 2022-09-23 NOTE — PROGRESS NOTE ADULT - PROVIDER SPECIALTY LIST ADULT
Internal Medicine
Internal Medicine
Surgery
Infectious Disease
Internal Medicine
Infectious Disease
Surgery
Gastroenterology
Gastroenterology
Internal Medicine
Gastroenterology

## 2022-09-23 NOTE — PROGRESS NOTE ADULT - NEGATIVE GENERAL SYMPTOMS
no fever/no chills/no polyphagia/no polyuria/no polydipsia

## 2022-09-23 NOTE — PROGRESS NOTE ADULT - NEGATIVE GASTROINTESTINAL SYMPTOMS
no nausea/no vomiting/no diarrhea/no melena/no hematochezia/no steatorrhea/no jaundice/no hiccoughs

## 2022-09-23 NOTE — PROGRESS NOTE ADULT - SUBJECTIVE AND OBJECTIVE BOX
NP Note discussed with  Primary Attending    Patient is a 77y old  Female who presents with a chief complaint of Pancreatitis (23 Sep 2022 10:58)      INTERVAL HPI/OVERNIGHT EVENTS: no new complaints    MEDICATIONS  (STANDING):  enoxaparin Injectable 40 milliGRAM(s) SubCutaneous every 24 hours  levothyroxine 88 MICROGram(s) Oral daily  pantoprazole  Injectable 40 milliGRAM(s) IV Push daily  piperacillin/tazobactam IVPB.. 3.375 Gram(s) IV Intermittent every 8 hours  potassium chloride    Tablet ER 20 milliEquivalent(s) Oral every 2 hours  sodium chloride 0.9%. 1000 milliLiter(s) (110 mL/Hr) IV Continuous <Continuous>    MEDICATIONS  (PRN):  acetaminophen     Tablet .. 650 milliGRAM(s) Oral every 6 hours PRN Temp greater or equal to 38C (100.4F), Mild Pain (1 - 3)  morphine  - Injectable 2 milliGRAM(s) IV Push every 6 hours PRN Severe Pain (7 - 10)  OLANZapine Injectable 2.5 milliGRAM(s) IntraMuscular every 6 hours PRN Agitation  ondansetron Injectable 4 milliGRAM(s) IV Push every 6 hours PRN Nausea  oxycodone    5 mG/acetaminophen 325 mG 1 Tablet(s) Oral every 6 hours PRN Moderate Pain (4 - 6)      __________________________________________________  REVIEW OF SYSTEMS:    CONSTITUTIONAL: No fever,   EYES: no acute visual disturbances  NECK: No pain or stiffness  RESPIRATORY: No cough; No shortness of breath  CARDIOVASCULAR: No chest pain, no palpitations  GASTROINTESTINAL: No pain. No nausea or vomiting; No diarrhea   NEUROLOGICAL: No headache or numbness, no tremors  MUSCULOSKELETAL: No joint pain, no muscle pain  GENITOURINARY: no dysuria, no frequency, no hesitancy  PSYCHIATRY: no depression , no anxiety  ALL OTHER  ROS negative        Vital Signs Last 24 Hrs  T(C): 36.6 (23 Sep 2022 12:02), Max: 36.8 (22 Sep 2022 12:55)  T(F): 97.9 (23 Sep 2022 12:02), Max: 98.3 (22 Sep 2022 12:55)  HR: 59 (23 Sep 2022 12:02) (50 - 59)  BP: 127/57 (23 Sep 2022 12:02) (113/56 - 139/59)  BP(mean): --  RR: 14 (23 Sep 2022 12:02) (14 - 18)  SpO2: 97% (23 Sep 2022 12:02) (97% - 100%)    Parameters below as of 23 Sep 2022 12:02  Patient On (Oxygen Delivery Method): room air        ________________________________________________  PHYSICAL EXAM:  GENERAL: NAD  HEENT: Normocephalic;  conjunctivae and sclerae clear; moist mucous membranes;   NECK : supple  CHEST/LUNG: Clear to auscultation bilaterally with good air entry   HEART: S1 S2  regular; no murmurs, gallops or rubs  ABDOMEN: Soft, Nontender, Nondistended; Bowel sounds present  EXTREMITIES: no cyanosis; no edema; no calf tenderness  SKIN: warm and dry; no rash  NERVOUS SYSTEM:  Awake and alert; Oriented  to place, person and time ; no new deficits    _________________________________________________  LABS:                        12.2   9.55  )-----------( 323      ( 23 Sep 2022 08:56 )             38.0     09-23    143  |  109<H>  |  1<L>  ----------------------------<  86  3.4<L>   |  27  |  0.57    Ca    8.2<L>      23 Sep 2022 08:56    TPro  6.0  /  Alb  1.8<L>  /  TBili  0.5  /  DBili  x   /  AST  56<H>  /  ALT  86<H>  /  AlkPhos  173<H>  09-22        CAPILLARY BLOOD GLUCOSE            RADIOLOGY & ADDITIONAL TESTS:    Imaging  Reviewed:  YES/NO    Consultant(s) Notes Reviewed:   YES/ No      Plan of care was discussed with patient and /or primary care giver; all questions and concerns were addressed

## 2022-09-23 NOTE — DISCHARGE NOTE PROVIDER - HOSPITAL COURSE
Patient is a 77 year old female, baseline AAOx2~3, with PMHx of hypothyroidism, dementia, and sarcoidosis, who comes in with 1 day history of altered mental status. Admitted for gallstone pancreatitis. Surgery consulted recommended lap daija after acute pancreatitis is resolved. GI on board, MRCP completed and c/f for acute cholecystitis and acute pancreatitis Hospital course c/b E. coli bacteremia Ucx also + E.coli and was continued on Zosyn, ID Dr. Quispe following. Repeat blood cultures 9/20 NGTD. s/p  9/21 s/p laparoscopic cholecystectomy. patient to continue with PO abx Augmentin 875 BID x 7days.   Given patient's improved clinical status and current hemodynamic stability decision was made to discharge. Pt is stable for discharge per attending and is advised to f/u with PCP as out-patient. Please refer to Pt's complete medical chart with documents for a full hospital course, for this is only a brief summary.   Patient is a 77 year old female, baseline AAOx2~3, with PMHx of hypothyroidism, dementia, and sarcoidosis, who comes in with 1 day history of altered mental status. Admitted for gallstone pancreatitis. Surgery consulted recommended lap daija after acute pancreatitis is resolved. GI on board, MRCP completed and c/f for acute cholecystitis and acute pancreatitis Hospital course c/b E. coli bacteremia Ucx also + E.coli and was continued on Zosyn, ID Dr. Quispe following. Repeat blood cultures 9/20 NGTD. s/p  9/21 s/p laparoscopic cholecystectomy. patient to continue with PO abx Augmentin 875 BID x 7days.   Given patient's improved clinical status and current hemodynamic stability decision was made to discharge. Pt is stable for discharge per attending and is advised to f/u with PCP as out-patient as well as surgeon, Dr. Iqbal. Please refer to Pt's complete medical chart with documents for a full hospital course, for this is only a brief summary.

## 2022-09-23 NOTE — DISCHARGE NOTE PROVIDER - PROVIDER TOKENS
PROVIDER:[TOKEN:[[id number]],FOLLOWUP:[2 weeks]] PROVIDER:[TOKEN:[[id number]],FOLLOWUP:[2 weeks]],PROVIDER:[TOKEN:[[id number]],FOLLOWUP:[2 weeks],ESTABLISHEDPATIENT:[T]]

## 2022-09-23 NOTE — DISCHARGE NOTE NURSING/CASE MANAGEMENT/SOCIAL WORK - PATIENT PORTAL LINK FT
You can access the FollowMyHealth Patient Portal offered by [facility] by registering at the following website: [email or web address]. By joining [employer]’s FollowMyHealth portal, you will also be able to view your health information using other applications (apps) compatible with our system.

## 2022-09-23 NOTE — DISCHARGE NOTE PROVIDER - NSDCCPCAREPLAN_GEN_ALL_CORE_FT
PRINCIPAL DISCHARGE DIAGNOSIS  Diagnosis: Acute pancreatitis  Assessment and Plan of Treatment: you presented with abdominal pain, CT scan found  Mild acute interstitial edematous pancreatitis involving the pancreatic body and tail and Gallstones.   Gallstones. The gallbladder is moderately distended with mild wall   thickening and pericholecystic edema.  Right colonic thickening may represent colitis. Correlate with recent   colonoscopy to exclude underlying neoplasm. Diffusediverticulosis.  Possible cystitis. Correlate with urinalysis.  IMPRESSION: The common bile duct measures 6 mm in caliber which is within  normal limits. No evidence for choledocholithiasis.  Cholelithiasis. Small pericholecystic fluid and/or mild gallbladder wall   thickening/edema. Findings may represent acute cholecystitis. If   clinically indicated, HIDA scan may be pursued for further evaluation.  Mild edema adjacent to the pancreatic tail, suggestive of acute   pancreatitis. Clinical correlation with pancreatic enzymes is recommended.  --- End of Report ---        SECONDARY DISCHARGE DIAGNOSES  Diagnosis: Hypokalemia  Assessment and Plan of Treatment:     Diagnosis: Cholelithiasis  Assessment and Plan of Treatment:      PRINCIPAL DISCHARGE DIAGNOSIS  Diagnosis: Gallstone pancreatitis  Assessment and Plan of Treatment: you presented with abdominal pain, CT scan found  Mild acute interstitial edematous pancreatitis involving the pancreatic body and tail and Gallstones.  You were evaluated and followed by gastroenterology team during your hospital stay and treated with IV Hydration. MRCP completed and showed cholelithiasis. Surgery consulted and you underwent a laparoscopic Cholecystectomy on 9/21/22. you have tolerated diet since then. Keep steri-strips to areas in place, you may remove outer dressings and resume showers. Follow up with Dr. Iqbal in 1-2 weeks for follow up.         SECONDARY DISCHARGE DIAGNOSES  Diagnosis: Bacteremia due to Escherichia coli  Assessment and Plan of Treatment: Blood cultures were taken during your stay and were positive for E. Coli, you were followed and treated by infectious disease specialist during you stay. you were started on IV antbiotics Zosyn. Please continue to take your antibiotics at home, Augmentin 875mg twice a day for 7 days. please take your medication as prescribed even if you start to feel better prior to completion.   bacteremia is the presence of bacteria in the bloodstream. Complete antibiotic course as infectious doctor recommended. If you have fever, chills, loss of appetite of nausea/ vomiting, call health care provider and follow the instruction.   follow up with your primary doctor in community within 2 weeks from discharge from hospital.      Diagnosis: Acute UTI  Assessment and Plan of Treatment: Found to have UTI. urine culture positive for E. Coli. you were followed by Infectious Disease  and treated with IV antibiotic Zosyn during your hospital stay.   HOME CARE INSTRUCTIONS  If you were prescribed antibiotics, take them exactly as your caregiver instructs you. Finish the medication even if you feel better after you have only taken some of the medication.  Drink enough water and fluids to keep your urine clear or pale yellow.  Avoid caffeine, tea, and carbonated beverages. They tend to irritate your bladder.  Empty your bladder often. Avoid holding urine for long periods of time.  After a bowel movement, women should cleanse from front to back. Use each tissue only once.  SEEK MEDICAL CARE IF:  You have back pain.  You develop a fever.  Your symptoms do not begin to resolve within 3 days.  SEEK IMMEDIATE MEDICAL CARE IF:  You have severe back pain or lower abdominal pain.  You develop chills.  You have nausea or vomiting.  You have continued burning or discomfort with urination.      Diagnosis: Dementia  Assessment and Plan of Treatment: Dementia care: Create a safe environment. Remove locks on bathroom doors. Cover electrical outlets, use childproof latched on cabinets. Install childproof devices to keep doors and windows secured. Childproof stoves , ovens, medications, water temperature on water heaters. Secure knives, lighters, matches, power tools, and guns. Falls precautions, free from clutter, remove throw rugs. Insure adequate lighting. Install grab rails as needed. Obtain life line, use baby monitors. Provide 24hr /7 day per week supervision Reduce confusion. Keep familiar objects and people around. Use night lights or dim lights at night. Use reminders, notes, or directions for daily activities or tasks. Keep a simple, consistent routine for waking, meals, bathing, dressing, and bedtime. Create a calm, quiet environment. Place large clocks and calendars prominently.  Display emergency numbers and home address near all telephones. Ensure a regular walking or physical activity schedule. Involve the person in daily activities as much as possible. Limit napping during the day.  Limit caffeine. Attend social events that stimulate rather than overwhelm the senses. Encourage good nutrition and hydration. Reduce distractions during meal times and snacks. Monitor chewing and swallowing ability. Continue with routine vision, hearing, dental, and medical screenings. All medications per MD only. If change in behavior or patient becomes more confused or letharic seek medical attention.    Any new problem with brain function happens. This includes problems with balance, speech, or falling a lot.   New or worsened confusion develops. New or worsened sleepiness develops. Staying awake becomes hard to do. This could indicate an infection if fever develops.      Diagnosis: Hypokalemia  Assessment and Plan of Treatment:     Diagnosis: Cholelithiasis  Assessment and Plan of Treatment:

## 2022-09-23 NOTE — DISCHARGE NOTE PROVIDER - NSDCMRMEDTOKEN_GEN_ALL_CORE_FT
acetaminophen 325 mg oral tablet: 2 tab(s) orally every 6 hours, As needed, Temp greater or equal to 38C (100.4F), Mild Pain (1 - 3)  amoxicillin-clavulanate 875 mg-125 mg oral tablet: 1 tab(s) orally every 12 hours   Aricept 5 mg oral tablet: 1 tab(s) orally once a day (at bedtime)  levothyroxine 88 mcg (0.088 mg) oral tablet: 1 tab(s) orally once a day  oxycodone-acetaminophen 5 mg-325 mg oral tablet: 1 tab(s) orally every 6 hours, As needed, Moderate Pain (4 - 6) MDD:4  pantoprazole 40 mg oral delayed release tablet: 1 tab(s) orally once a day

## 2022-09-23 NOTE — PROGRESS NOTE ADULT - NEGATIVE GENERAL GENITOURINARY SYMPTOMS
no hematuria/no renal colic/no flank pain L/no flank pain R/no dysuria

## 2022-09-23 NOTE — PROGRESS NOTE ADULT - RESPIRATORY
clear to auscultation bilaterally/no wheezes/no rales/no rhonchi/no respiratory distress/no use of accessory muscles/breath sounds equal/good air movement

## 2022-09-23 NOTE — PROGRESS NOTE ADULT - SUBJECTIVE AND OBJECTIVE BOX
[   ] ICU                                          [   ] CCU                                      [ x  ] Medical Floor      Patient is a 77 year old female with abdominal pain. GI consulted to evaluate.        Patient is a 77 year old female, ambulatory with wheelchair, baseline AAOx2~3, lives with granddaughter & grandson, with past medical history significant for hypothyroidism, dementia, and sarcoidosis, presented with altered mental status. Patient c/o 2 days history of intermittent, sharp, 4/10 intensity epigastric abdominal pain radiating to her back associated with nausea. Patient denies hematemesis, hematochezia, melena, fever, chills, chest pain, SOB, cough, hematuria, dysuria or diarrhea.      Patient appears comfortable. No new complaints reported, No abdominal pain, N/V, hematemesis, hematochezia, melena, fever, chills, chest pain, SOB, cough or diarrhea reported.      PAIN MANAGEMENT:  Pain Scale:                4 /10  Pain Location:  Epigastric abdominal pain      Prior Colonoscopy:  No prior colonoscopy      PAST MEDICAL HISTORY  Dementia  Hypothyroidism  Sarcoidosis        PAST SURGICAL HISTORY  No significant past surgical history        Allergies    No Known Allergies    Intolerances  None       MEDICATIONS  (STANDING):  heparin   Injectable 5000 Unit(s) SubCutaneous every 8 hours  lactated ringers. 1000 milliLiter(s) (100 mL/Hr) IV Continuous <Continuous>  levothyroxine Injectable 66 MICROGram(s) IV Push at bedtime  pantoprazole  Injectable 40 milliGRAM(s) IV Push daily  piperacillin/tazobactam IVPB. 3.375 Gram(s) IV Intermittent once  piperacillin/tazobactam IVPB.- 3.375 Gram(s) IV Intermittent once    MEDICATIONS  (PRN):  ondansetron Injectable 4 milliGRAM(s) IV Push every 6 hours PRN Nausea and/or Vomiting      SOCIAL HISTORY  Advanced Directives:       [ X ] Full Code       [  ] DNR  Marital Status:         [  ] M      [ X ] S      [  ] D       [  ] W  Children:       [ X ] Yes      [  ] No  Occupation:        [  ] Employed       [ X ] Unemployed       [  ] Retired  Diet:       [ X ] Regular       [  ] PEG feeding          [  ] NG tube feeding  Drug Use:           [  ] Patient denied          [  ] Yes  Alcohol:           [ X ] No             [  ] Yes (socially)         [  ] Yes (chronic)  Tobacco:           [  ] Yes           [ X ] No      FAMILY HISTORY  [ X ] Heart Disease            [ X ] Diabetes             [X  ] HTN             [  ] Colon Cancer             [  ] Stomach Cancer              [  ] Pancreatic Cancer          VITALS  Vital Signs Last 24 Hrs  T(C): 36.6 (23 Sep 2022 12:02), Max: 36.7 (22 Sep 2022 20:42)  T(F): 97.9 (23 Sep 2022 12:02), Max: 98 (22 Sep 2022 20:42)  HR: 59 (23 Sep 2022 12:02) (50 - 59)  BP: 127/57 (23 Sep 2022 12:02) (126/53 - 139/59)     RR: 14 (23 Sep 2022 12:02) (14 - 18)  SpO2: 97% (23 Sep 2022 12:02) (97% - 100%)    Parameters below as of 23 Sep 2022 12:02  Patient On (Oxygen Delivery Method): room air     MEDICATIONS  (STANDING):  enoxaparin Injectable 40 milliGRAM(s) SubCutaneous every 24 hours  levothyroxine 88 MICROGram(s) Oral daily  pantoprazole  Injectable 40 milliGRAM(s) IV Push daily  piperacillin/tazobactam IVPB.. 3.375 Gram(s) IV Intermittent every 8 hours  sodium chloride 0.9%. 1000 milliLiter(s) (110 mL/Hr) IV Continuous <Continuous>    MEDICATIONS  (PRN):  acetaminophen     Tablet .. 650 milliGRAM(s) Oral every 6 hours PRN Temp greater or equal to 38C (100.4F), Mild Pain (1 - 3)  morphine  - Injectable 2 milliGRAM(s) IV Push every 6 hours PRN Severe Pain (7 - 10)  OLANZapine Injectable 2.5 milliGRAM(s) IntraMuscular every 6 hours PRN Agitation  ondansetron Injectable 4 milliGRAM(s) IV Push every 6 hours PRN Nausea  oxycodone    5 mG/acetaminophen 325 mG 1 Tablet(s) Oral every 6 hours PRN Moderate Pain (4 - 6)                            12.2   9.55  )-----------( 323      ( 23 Sep 2022 08:56 )             38.0       09-23    143  |  109<H>  |  1<L>  ----------------------------<  86  3.4<L>   |  27  |  0.57    Ca    8.2<L>      23 Sep 2022 08:56    TPro  6.0  /  Alb  1.8<L>  /  TBili  0.5  /  DBili  x   /  AST  56<H>  /  ALT  86<H>  /  AlkPhos  173<H>  09-22

## 2022-09-23 NOTE — PROGRESS NOTE ADULT - REASON FOR ADMISSION
Pancreatitis

## 2022-09-23 NOTE — PROGRESS NOTE ADULT - NEGATIVE MUSCULOSKELETAL SYMPTOMS
no muscle cramps/no stiffness/no neck pain/no arm pain L/no arm pain R/no back pain/no leg pain L/no leg pain R

## 2022-09-23 NOTE — DISCHARGE NOTE PROVIDER - NSFOLLOWUPCLINICS_GEN_ALL_ED_FT
[facility]  Internal Medicine  [address] [place] [address]  Phone: [phone]  Fax: [phone]  Follow Up Time: 2 weeks

## 2022-09-23 NOTE — DISCHARGE NOTE NURSING/CASE MANAGEMENT/SOCIAL WORK - NSDCPEFALRISK_GEN_ALL_CORE
For information on Fall & Injury Prevention, visit: [email or web address] OR  [email or web address] OR  https://www.cdc.gov/steadi/patient.html

## 2022-09-23 NOTE — DISCHARGE NOTE PROVIDER - CARE PROVIDERS DIRECT ADDRESSES
,DirectAddress_Unknown ,DirectAddress_Unknown,[email or web address]

## 2022-09-26 LAB — SURGICAL PATHOLOGY STUDY: SIGNIFICANT CHANGE UP

## 2022-09-30 ENCOUNTER — EMERGENCY (EMERGENCY)
Facility: HOSPITAL | Age: 77
LOS: 1 days | Discharge: HOME | End: 2022-09-30
Attending: STUDENT IN AN ORGANIZED HEALTH CARE EDUCATION/TRAINING PROGRAM
Payer: MEDICARE

## 2022-09-30 VITALS
DIASTOLIC BLOOD PRESSURE: 78 MMHG | SYSTOLIC BLOOD PRESSURE: 121 MMHG | TEMPERATURE: 98 F | HEART RATE: 74 BPM | OXYGEN SATURATION: 96 % | HEIGHT: 62 IN | RESPIRATION RATE: 16 BRPM | WEIGHT: 160.06 LBS

## 2022-09-30 PROCEDURE — 99285 EMERGENCY DEPT VISIT HI MDM: CPT

## 2022-09-30 RX ORDER — ACETAMINOPHEN 500 MG
650 TABLET ORAL ONCE
Refills: 0 | Status: COMPLETED | OUTPATIENT
Start: 2022-09-30 | End: 2022-09-30

## 2022-09-30 RX ORDER — SODIUM CHLORIDE 9 MG/ML
3 INJECTION INTRAMUSCULAR; INTRAVENOUS; SUBCUTANEOUS EVERY 8 HOURS
Refills: 0 | Status: DISCONTINUED | OUTPATIENT
Start: 2022-09-30 | End: 2022-10-04

## 2022-09-30 NOTE — ED ADULT TRIAGE NOTE - CHIEF COMPLAINT QUOTE
as per EMS daughter called ambulance and states " she like passed out two hours after she gave oxycodone "  Patient is awake and alert as per EMS when they arrived.  S/P surgery 1 week ago.

## 2022-10-01 VITALS
DIASTOLIC BLOOD PRESSURE: 77 MMHG | SYSTOLIC BLOOD PRESSURE: 139 MMHG | HEART RATE: 87 BPM | RESPIRATION RATE: 16 BRPM | OXYGEN SATURATION: 96 % | TEMPERATURE: 97 F

## 2022-10-01 LAB
ALBUMIN SERPL ELPH-MCNC: 2.4 G/DL — LOW (ref 3.5–5)
ALP SERPL-CCNC: 122 U/L — HIGH (ref 40–120)
ALT FLD-CCNC: 19 U/L DA — SIGNIFICANT CHANGE UP (ref 10–60)
ANION GAP SERPL CALC-SCNC: 9 MMOL/L — SIGNIFICANT CHANGE UP (ref 5–17)
APPEARANCE UR: CLEAR — SIGNIFICANT CHANGE UP
AST SERPL-CCNC: 26 U/L — SIGNIFICANT CHANGE UP (ref 10–40)
BACTERIA # UR AUTO: ABNORMAL /HPF
BASOPHILS # BLD AUTO: 0.1 K/UL — SIGNIFICANT CHANGE UP (ref 0–0.2)
BASOPHILS NFR BLD AUTO: 0.6 % — SIGNIFICANT CHANGE UP (ref 0–2)
BILIRUB SERPL-MCNC: 1 MG/DL — SIGNIFICANT CHANGE UP (ref 0.2–1.2)
BILIRUB UR-MCNC: NEGATIVE — SIGNIFICANT CHANGE UP
BUN SERPL-MCNC: 8 MG/DL — SIGNIFICANT CHANGE UP (ref 7–18)
CALCIUM SERPL-MCNC: 9.2 MG/DL — SIGNIFICANT CHANGE UP (ref 8.4–10.5)
CHLORIDE SERPL-SCNC: 102 MMOL/L — SIGNIFICANT CHANGE UP (ref 96–108)
CO2 SERPL-SCNC: 28 MMOL/L — SIGNIFICANT CHANGE UP (ref 22–31)
COLOR SPEC: YELLOW — SIGNIFICANT CHANGE UP
CREAT SERPL-MCNC: 0.53 MG/DL — SIGNIFICANT CHANGE UP (ref 0.5–1.3)
DIFF PNL FLD: ABNORMAL
EGFR: 95 ML/MIN/1.73M2 — SIGNIFICANT CHANGE UP
EOSINOPHIL # BLD AUTO: 0.03 K/UL — SIGNIFICANT CHANGE UP (ref 0–0.5)
EOSINOPHIL NFR BLD AUTO: 0.2 % — SIGNIFICANT CHANGE UP (ref 0–6)
EPI CELLS # UR: ABNORMAL /HPF
GLUCOSE SERPL-MCNC: 97 MG/DL — SIGNIFICANT CHANGE UP (ref 70–99)
GLUCOSE UR QL: NEGATIVE — SIGNIFICANT CHANGE UP
HCT VFR BLD CALC: 38.9 % — SIGNIFICANT CHANGE UP (ref 34.5–45)
HGB BLD-MCNC: 12.5 G/DL — SIGNIFICANT CHANGE UP (ref 11.5–15.5)
IMM GRANULOCYTES NFR BLD AUTO: 0.4 % — SIGNIFICANT CHANGE UP (ref 0–0.9)
KETONES UR-MCNC: ABNORMAL
LEUKOCYTE ESTERASE UR-ACNC: ABNORMAL
LYMPHOCYTES # BLD AUTO: 1.28 K/UL — SIGNIFICANT CHANGE UP (ref 1–3.3)
LYMPHOCYTES # BLD AUTO: 7.3 % — LOW (ref 13–44)
MCHC RBC-ENTMCNC: 29.8 PG — SIGNIFICANT CHANGE UP (ref 27–34)
MCHC RBC-ENTMCNC: 32.1 GM/DL — SIGNIFICANT CHANGE UP (ref 32–36)
MCV RBC AUTO: 92.6 FL — SIGNIFICANT CHANGE UP (ref 80–100)
MONOCYTES # BLD AUTO: 1.2 K/UL — HIGH (ref 0–0.9)
MONOCYTES NFR BLD AUTO: 6.9 % — SIGNIFICANT CHANGE UP (ref 2–14)
NEUTROPHILS # BLD AUTO: 14.74 K/UL — HIGH (ref 1.8–7.4)
NEUTROPHILS NFR BLD AUTO: 84.6 % — HIGH (ref 43–77)
NITRITE UR-MCNC: NEGATIVE — SIGNIFICANT CHANGE UP
NRBC # BLD: 0 /100 WBCS — SIGNIFICANT CHANGE UP (ref 0–0)
PH UR: 6 — SIGNIFICANT CHANGE UP (ref 5–8)
PLATELET # BLD AUTO: 586 K/UL — HIGH (ref 150–400)
POTASSIUM SERPL-MCNC: 3.2 MMOL/L — LOW (ref 3.5–5.3)
POTASSIUM SERPL-SCNC: 3.2 MMOL/L — LOW (ref 3.5–5.3)
PROT SERPL-MCNC: 7.8 G/DL — SIGNIFICANT CHANGE UP (ref 6–8.3)
PROT UR-MCNC: 15
RBC # BLD: 4.2 M/UL — SIGNIFICANT CHANGE UP (ref 3.8–5.2)
RBC # FLD: 15.9 % — HIGH (ref 10.3–14.5)
RBC CASTS # UR COMP ASSIST: ABNORMAL /HPF (ref 0–2)
SODIUM SERPL-SCNC: 139 MMOL/L — SIGNIFICANT CHANGE UP (ref 135–145)
SP GR SPEC: 1.01 — SIGNIFICANT CHANGE UP (ref 1.01–1.02)
TROPONIN I, HIGH SENSITIVITY RESULT: 12.5 NG/L — SIGNIFICANT CHANGE UP
UROBILINOGEN FLD QL: 1
WBC # BLD: 17.42 K/UL — HIGH (ref 3.8–10.5)
WBC # FLD AUTO: 17.42 K/UL — HIGH (ref 3.8–10.5)
WBC UR QL: SIGNIFICANT CHANGE UP /HPF (ref 0–5)

## 2022-10-01 PROCEDURE — 80053 COMPREHEN METABOLIC PANEL: CPT

## 2022-10-01 PROCEDURE — 87086 URINE CULTURE/COLONY COUNT: CPT

## 2022-10-01 PROCEDURE — 84484 ASSAY OF TROPONIN QUANT: CPT

## 2022-10-01 PROCEDURE — 93005 ELECTROCARDIOGRAM TRACING: CPT

## 2022-10-01 PROCEDURE — 82962 GLUCOSE BLOOD TEST: CPT

## 2022-10-01 PROCEDURE — 36415 COLL VENOUS BLD VENIPUNCTURE: CPT

## 2022-10-01 PROCEDURE — 99284 EMERGENCY DEPT VISIT MOD MDM: CPT

## 2022-10-01 PROCEDURE — 81001 URINALYSIS AUTO W/SCOPE: CPT

## 2022-10-01 PROCEDURE — 85025 COMPLETE CBC W/AUTO DIFF WBC: CPT

## 2022-10-01 RX ORDER — POTASSIUM CHLORIDE 20 MEQ
40 PACKET (EA) ORAL ONCE
Refills: 0 | Status: COMPLETED | OUTPATIENT
Start: 2022-10-01 | End: 2022-10-01

## 2022-10-01 RX ORDER — POTASSIUM CHLORIDE 20 MEQ
40 PACKET (EA) ORAL ONCE
Refills: 0 | Status: DISCONTINUED | OUTPATIENT
Start: 2022-10-01 | End: 2022-10-01

## 2022-10-01 RX ADMIN — Medication 650 MILLIGRAM(S): at 00:29

## 2022-10-01 RX ADMIN — SODIUM CHLORIDE 3 MILLILITER(S): 9 INJECTION INTRAMUSCULAR; INTRAVENOUS; SUBCUTANEOUS at 06:20

## 2022-10-01 RX ADMIN — Medication 650 MILLIGRAM(S): at 06:20

## 2022-10-01 RX ADMIN — Medication 40 MILLIEQUIVALENT(S): at 06:58

## 2022-10-01 NOTE — ED PROVIDER NOTE - PHYSICAL EXAMINATION
Vital Signs Reviewed  GEN: Comfortable, NAD, AO to person  HEENT: NCAT, MMM, Neck Supple  RESP: CTAB, No rales/rhonchi/wheezing  CV: RRR, S1S2, No murmurs  ABD: No TTP, Soft, ND, No masses, No CVA Tenderness  Extrem/Skin: Equal pulses bilat, No cyanosis/edema/rashes

## 2022-10-01 NOTE — ED PROVIDER NOTE - PATIENT PORTAL LINK FT
You can access the FollowMyHealth Patient Portal offered by [facility] by registering at the following website: [email or web address]. By joining [facility]’s FollowMyHealth portal, you will also be able to view your health information using other applications (apps) compatible with our system.

## 2022-10-01 NOTE — ED PROVIDER NOTE - OBJECTIVE STATEMENT
77 year old female with a PMHx dementia and h/o cholecystectomy 1 week ago presents to the ED with complaints of an episode of syncope and drowsiness after taking 2 of her prescribed Oxycodone this evening. History per daughter, Dariana, as patient states she feels well and does not know why she is in the ER. Daughter states shortly after taking the pain medications, she appeared to be unconscious for 3 minutes but denies any seizure activity and has now returned to mental baseline. Denies recent complaints or symptoms.   NKDA.

## 2022-10-01 NOTE — ED PROVIDER NOTE - NSICDXPASTMEDICALHX_GEN_ALL_CORE_FT
PAST MEDICAL HISTORY:  Dementia     Hypothyroidism     Sarcoidosis       History of cholecystectomy

## 2022-10-01 NOTE — ED PROVIDER NOTE - NSFOLLOWUPINSTRUCTIONS_ED_ALL_ED_FT
You were seen in the emergency room today. Please call your primary doctor to inform them of this ER visit and obtain the next available appointment within the next 5 days. As we discussed, return to the ER if you have any worsening symptoms.    We no longer feel that you need further emergency care or admission to the hospital at this time.    While we have determined that you are currently stable for discharge, we know that things can change. Please seek immediate medical attention or return to the ER if you experience any of the following:  Any worsening or persistent symptoms  Severe Pain  Chest Pain  Difficulty Breathing  Bleeding  Passing Out  Severe Rash  Inability to Eat or Drink  Persistent Fever    Please see a primary care doctor or specialist within 5 days to ensure that you are improving.    Please call the [facility] phone numbers on this document if you have any problems obtaining a follow up appointment.    I wish you well! -Dr Crenshaw

## 2022-10-01 NOTE — ED PROVIDER NOTE - CLINICAL SUMMARY MEDICAL DECISION MAKING FREE TEXT BOX
Patient presents to the ED with complaints of syncope after taking narcotic pain medications. EKG showed some PACs and T wave flattening. Labs pending. Patient stable. Will reassess. Patient presents to the ED with complaints of syncope after taking narcotic pain medications. EKG showed some PACs and T wave flattening. Labs pending. Patient stable. Will reassess.    Labs showing high WBC and low K (given K powder). Cath'd urine and no obvious UTI with culture pending. On reassessment pt states that she's feeling well and daughter at bedside would like pt d/c. Rec PMD f/u ASAP. Most likely syncope 2/2 medication - the details of the case, history, and exam make more emergent diagnoses much less likely. Discussed with pt my clinical impression and results, patient given strict return precautions if persistent or worsening of symptoms occurs, and need for close follow up. Pt expressed understanding and agrees with plan. Pt is well appearing with a reassuring exam. Discharge home with PMD or Specialist f/u within 5 days.

## 2022-10-02 LAB
CULTURE RESULTS: SIGNIFICANT CHANGE UP
SPECIMEN SOURCE: SIGNIFICANT CHANGE UP

## 2022-10-17 ENCOUNTER — EMERGENCY (EMERGENCY)
Facility: HOSPITAL | Age: 77
LOS: 1 days | Discharge: HOME | End: 2022-10-17
Attending: EMERGENCY MEDICINE
Payer: MEDICARE

## 2022-10-17 VITALS
RESPIRATION RATE: 18 BRPM | HEIGHT: 62 IN | HEART RATE: 68 BPM | DIASTOLIC BLOOD PRESSURE: 52 MMHG | OXYGEN SATURATION: 98 % | SYSTOLIC BLOOD PRESSURE: 100 MMHG

## 2022-10-17 PROBLEM — Z90.49 ACQUIRED ABSENCE OF OTHER SPECIFIED PARTS OF DIGESTIVE TRACT: Chronic | Status: ACTIVE | Noted: 2022-10-01

## 2022-10-17 LAB
ALBUMIN SERPL ELPH-MCNC: 2.4 G/DL — LOW (ref 3.5–5)
ALP SERPL-CCNC: 91 U/L — SIGNIFICANT CHANGE UP (ref 40–120)
ALT FLD-CCNC: 13 U/L DA — SIGNIFICANT CHANGE UP (ref 10–60)
ANION GAP SERPL CALC-SCNC: 9 MMOL/L — SIGNIFICANT CHANGE UP (ref 5–17)
APTT BLD: 29.1 SEC — SIGNIFICANT CHANGE UP (ref 27.5–35.5)
AST SERPL-CCNC: 22 U/L — SIGNIFICANT CHANGE UP (ref 10–40)
BASOPHILS # BLD AUTO: 0.07 K/UL — SIGNIFICANT CHANGE UP (ref 0–0.2)
BASOPHILS NFR BLD AUTO: 0.5 % — SIGNIFICANT CHANGE UP (ref 0–2)
BILIRUB SERPL-MCNC: 1 MG/DL — SIGNIFICANT CHANGE UP (ref 0.2–1.2)
BUN SERPL-MCNC: 13 MG/DL — SIGNIFICANT CHANGE UP (ref 7–18)
CALCIUM SERPL-MCNC: 8.9 MG/DL — SIGNIFICANT CHANGE UP (ref 8.4–10.5)
CHLORIDE SERPL-SCNC: 103 MMOL/L — SIGNIFICANT CHANGE UP (ref 96–108)
CO2 SERPL-SCNC: 30 MMOL/L — SIGNIFICANT CHANGE UP (ref 22–31)
CREAT SERPL-MCNC: 0.5 MG/DL — SIGNIFICANT CHANGE UP (ref 0.5–1.3)
EGFR: 97 ML/MIN/1.73M2 — SIGNIFICANT CHANGE UP
EOSINOPHIL # BLD AUTO: 0.01 K/UL — SIGNIFICANT CHANGE UP (ref 0–0.5)
EOSINOPHIL NFR BLD AUTO: 0.1 % — SIGNIFICANT CHANGE UP (ref 0–6)
GLUCOSE SERPL-MCNC: 105 MG/DL — HIGH (ref 70–99)
HCT VFR BLD CALC: 37.9 % — SIGNIFICANT CHANGE UP (ref 34.5–45)
HGB BLD-MCNC: 12.2 G/DL — SIGNIFICANT CHANGE UP (ref 11.5–15.5)
IMM GRANULOCYTES NFR BLD AUTO: 0.6 % — SIGNIFICANT CHANGE UP (ref 0–0.9)
INR BLD: 1.17 RATIO — HIGH (ref 0.88–1.16)
LACTATE SERPL-SCNC: 1.4 MMOL/L — SIGNIFICANT CHANGE UP (ref 0.7–2)
LYMPHOCYTES # BLD AUTO: 1.99 K/UL — SIGNIFICANT CHANGE UP (ref 1–3.3)
LYMPHOCYTES # BLD AUTO: 15.3 % — SIGNIFICANT CHANGE UP (ref 13–44)
MAGNESIUM SERPL-MCNC: 1.9 MG/DL — SIGNIFICANT CHANGE UP (ref 1.6–2.6)
MANUAL SMEAR VERIFICATION: SIGNIFICANT CHANGE UP
MCHC RBC-ENTMCNC: 29.7 PG — SIGNIFICANT CHANGE UP (ref 27–34)
MCHC RBC-ENTMCNC: 32.2 GM/DL — SIGNIFICANT CHANGE UP (ref 32–36)
MCV RBC AUTO: 92.2 FL — SIGNIFICANT CHANGE UP (ref 80–100)
MONOCYTES # BLD AUTO: 1.65 K/UL — HIGH (ref 0–0.9)
MONOCYTES NFR BLD AUTO: 12.7 % — SIGNIFICANT CHANGE UP (ref 2–14)
NEUTROPHILS # BLD AUTO: 9.23 K/UL — HIGH (ref 1.8–7.4)
NEUTROPHILS NFR BLD AUTO: 70.8 % — SIGNIFICANT CHANGE UP (ref 43–77)
NRBC # BLD: 0 /100 WBCS — SIGNIFICANT CHANGE UP (ref 0–0)
NT-PROBNP SERPL-SCNC: 214 PG/ML — SIGNIFICANT CHANGE UP (ref 0–450)
PLAT MORPH BLD: NORMAL — SIGNIFICANT CHANGE UP
PLATELET # BLD AUTO: 527 K/UL — HIGH (ref 150–400)
POTASSIUM SERPL-MCNC: 2.7 MMOL/L — CRITICAL LOW (ref 3.5–5.3)
POTASSIUM SERPL-SCNC: 2.7 MMOL/L — CRITICAL LOW (ref 3.5–5.3)
PROT SERPL-MCNC: 7.5 G/DL — SIGNIFICANT CHANGE UP (ref 6–8.3)
PROTHROM AB SERPL-ACNC: 13.9 SEC — HIGH (ref 10.5–13.4)
RBC # BLD: 4.11 M/UL — SIGNIFICANT CHANGE UP (ref 3.8–5.2)
RBC # FLD: 14.7 % — HIGH (ref 10.3–14.5)
RBC BLD AUTO: NORMAL — SIGNIFICANT CHANGE UP
SARS-COV-2 RNA SPEC QL NAA+PROBE: SIGNIFICANT CHANGE UP
SODIUM SERPL-SCNC: 142 MMOL/L — SIGNIFICANT CHANGE UP (ref 135–145)
TROPONIN I, HIGH SENSITIVITY RESULT: 11.8 NG/L — SIGNIFICANT CHANGE UP
WBC # BLD: 13.03 K/UL — HIGH (ref 3.8–10.5)
WBC # FLD AUTO: 13.03 K/UL — HIGH (ref 3.8–10.5)

## 2022-10-17 PROCEDURE — 71045 X-RAY EXAM CHEST 1 VIEW: CPT | Mod: 26

## 2022-10-17 PROCEDURE — 99285 EMERGENCY DEPT VISIT HI MDM: CPT

## 2022-10-17 RX ORDER — SODIUM CHLORIDE 9 MG/ML
1000 INJECTION INTRAMUSCULAR; INTRAVENOUS; SUBCUTANEOUS ONCE
Refills: 0 | Status: COMPLETED | OUTPATIENT
Start: 2022-10-17 | End: 2022-10-17

## 2022-10-17 RX ORDER — POTASSIUM CHLORIDE 20 MEQ
40 PACKET (EA) ORAL ONCE
Refills: 0 | Status: COMPLETED | OUTPATIENT
Start: 2022-10-17 | End: 2022-10-17

## 2022-10-17 RX ORDER — POTASSIUM CHLORIDE 20 MEQ
10 PACKET (EA) ORAL
Refills: 0 | Status: COMPLETED | OUTPATIENT
Start: 2022-10-17 | End: 2022-10-18

## 2022-10-17 RX ADMIN — SODIUM CHLORIDE 1000 MILLILITER(S): 9 INJECTION INTRAMUSCULAR; INTRAVENOUS; SUBCUTANEOUS at 16:35

## 2022-10-17 RX ADMIN — Medication 100 MILLIEQUIVALENT(S): at 22:04

## 2022-10-17 RX ADMIN — Medication 100 MILLIEQUIVALENT(S): at 23:07

## 2022-10-17 RX ADMIN — Medication 40 MILLIEQUIVALENT(S): at 22:04

## 2022-10-17 NOTE — ED ADULT TRIAGE NOTE - CHIEF COMPLAINT QUOTE
as per ems report pt had a syncopal episode while they were moving her from bed to chair , family verbalizes LOC, daughter Dariana [phone]

## 2022-10-17 NOTE — ED ADULT NURSE NOTE - NS ED NURSE IV DC DT
Received voice mail regarding transportation needs for the patient going for an MRI at [facility] today at 12 noon. Called the [facility] Liaison to confirm ability to provide transport for the patient which she did confirm the ability once the referral was entered in [facility]. Referral sent to [facility]. Email sent to [facility] with the Referral # for her to process the case. JULIA Og has received the referral and have received confirmation of 1100 pickup of the patient for the MRI by [facility] and patient will be transported to [facility] [place] for the testing, crew to wait with the patient and then transfer the patient back to [facility]. Lola Howard, patient's nurse and made her aware of the transportation arrangements. She asked about the nurse accompaniment and I made her aware that I did not arrange this and she stated she would call the nursing supervisor for this. Pablo Sawyer of Care Management 18-Oct-2022 02:33

## 2022-10-17 NOTE — ED PROVIDER NOTE - OBJECTIVE STATEMENT
77 year old female with pmhx of dementia was brought in by ambulance for a syncopal episode. As per daughter (Dariana), patient was sitting in chair and felt cold. Blankets were applied to her, but she "passed out for a feel minutes". Daughter is also at bedside. Daughter also relates patient is currently at baseline, and states patient had similar episodes in the past, but has not have a workup yet. Paintet has no acute complaints. 77 year old female with pmhx of dementia baseline not ambulatory BIBA for syncopal episode. As per daughter (Dariana), patient was sitting in chair and felt cold. Blankets were applied to her, but she "passed out for a few minutes". Woke up alert w/o any shaking or loss of urine. Daughter is at bedside relates patient is currently at baseline, and states patient had similar episodes in the past,with no clear cause found. Patient has no acute complaints. No headstrike, vomiting, diarrhea, SOB, CP or abd pain.

## 2022-10-17 NOTE — ED ADULT NURSE NOTE - NSFALLRSKASSESSTYPE_ED_ALL_ED
Attempted to reach [facility] pharmacy and the pharmacy was out to lunch  I'm only making an assumption that since I spoke with another [facility] and this wasn't something that was carried by them it would be the same for this patient  Please advise 
Patient to have mid-line catheter placed tomorrow at infusion center for outpatient infusions   Outpatient saline flushes required BID - sent to her pharmacy
Spoke to patient on late afternoon of 03/15/22:   Advised her of mid-line catheter option to help with IVF's and reduce hard sticks  She is unsure if she wants this and I advised her that she does not have to if she is not comfortable but to have the discussion with the infusion nursing supervisor  She verbalized understanding and will f/u with infusion on 03/16/22 as scheduled  She also reports vomiting with eating chicken the night before, admits to eating too quickly and not taking her time or chewing well  She will stick with moist and soft proteins and take her time and chew well  Continue f/u with RD 
Initial (On Arrival)

## 2022-10-17 NOTE — ED PROVIDER NOTE - CLINICAL SUMMARY MEDICAL DECISION MAKING FREE TEXT BOX
77 year old female with dementia here for syncope. Patient is currently at baseline and slightly hypotensive. will perform labs, and reassess. 77 year old female with dementia here for syncopal episode. Patient is currently at baseline. Will perform labs and reassess.

## 2022-10-17 NOTE — ED PROVIDER NOTE - NSICDXPASTMEDICALHX_GEN_ALL_CORE_FT
PAST MEDICAL HISTORY:  Dementia     History of cholecystectomy     Hypothyroidism     Sarcoidosis

## 2022-10-17 NOTE — ED PROVIDER NOTE - PROGRESS NOTE DETAILS
Granddaughter Doris at bedside. Educated on results so far, plan to replete potassium. Recommended admission for further syncopal episode w/u. Granddaughter states that they do not want pt admitted to hospital. Understand r/o undetected heart condition, infection causing passing out which may lead to disability, death as a result. S/o pending potassium repletion and anticipated d/c home afterwards via ambulance. joel: instructed to return if worsens. potassium replacement. wisam: RN while cutting the wrist band the scissor made a superficial left hand. no tendon or nerve involvement. bandaid and neosporin. no intervention

## 2022-10-17 NOTE — ED PROVIDER NOTE - CARDIOVASCULAR [+], MLM
- DLBCL, on rituximab outpatient  - no longer a candidate for chemotherapy due to worsening clinical status  - follows with Dr. Sinha loss of consciousness/SYNCOPE

## 2022-10-17 NOTE — ED ADULT NURSE NOTE - OBJECTIVE STATEMENT
As per the daughter of the pt, c/o syncope episode at home this evening. Pt denies all other symptoms.

## 2022-10-17 NOTE — ED PROVIDER NOTE - NSFOLLOWUPINSTRUCTIONS_ED_ALL_ED_FT
Syncope, Adult    Outline of the head showing blood vessels that supply the brain.   Syncope refers to a condition in which a person temporarily loses consciousness. Syncope may also be called fainting or passing out. It is caused by a sudden decrease in blood flow to the brain. This can happen for a variety of reasons.    Most causes of syncope are not dangerous. It can be triggered by things such as needle sticks, seeing blood, pain, or intense emotion. However, syncope can also be a sign of a serious medical problem, such as a heart abnormality. Other causes can include dehydration, migraines, or taking medicines that lower blood pressure. Your health care provider may do tests to find the reason why you are having syncope.    If you faint, get medical help right away. Call your local emergency services (911 in the [place]      Follow these instructions at home:    Pay attention to any changes in your symptoms. Take these actions to stay safe and to help relieve your symptoms:    Knowing when you may be about to faint   •Signs that you may be about to faint include:  •Feeling dizzy, weak, light-headed, or like the room is spinning.      •Feeling nauseous.      •Seeing spots or seeing all white or all black in your field of vision.      •Having cold, clammy skin or feeling warm and sweaty.      •Hearing ringing in the ears (tinnitus).      •If you start to feel like you might faint, sit or lie down right away. If sitting, put your head down between your legs. If lying down, raise (elevate) your feet above the level of your heart.  •Breathe deeply and steadily. Wait until all the symptoms have passed.      •Have someone stay with you until you feel stable.        Medicines     •Take over-the-counter and prescription medicines only as told by your health care provider.      •If you are taking blood pressure or heart medicine, get up slowly and take several minutes to sit and then stand. This can reduce dizziness and decrease the risk of syncope.      Lifestyle     • Do not drive, use machinery, or play sports until your health care provider says it is okay.      • Do not drink alcohol.      • Do not use any products that contain nicotine or tobacco. These products include cigarettes, chewing tobacco, and vaping devices, such as e-cigarettes. If you need help quitting, ask your health care provider.      •Avoid hot tubs and saunas.      General instructions     •Talk with your health care provider about your symptoms. You may need to have testing to understand the cause of your syncope.      •Drink enough fluid to keep your urine pale yellow.    •Avoid prolonged standing. If you must stand for a long time, do movements such as:  •Moving your legs.      •Crossing your legs.      •Flexing and stretching your leg muscles.      •Squatting.        •Keep all follow-up visits. This is important.        Contact a health care provider if:    •You have episodes of near fainting.        Get help right away if:    •You faint.      •You hit your head or are injured after fainting.    •You have any of these symptoms that may indicate trouble with your heart:  •Fast or irregular heartbeats (palpitations).      •Unusual pain in your chest, abdomen, or back.      •Shortness of breath.        •You have a seizure.      •You have a severe headache.      •You are confused.      •You have vision problems.      •You have severe weakness or trouble walking.      •You are bleeding from your mouth or rectum, or you have black or tarry stool.      These symptoms may represent a serious problem that is an emergency. Do not wait to see if your symptoms will go away. Get medical help right away. Call your local emergency services (911 in the [place] Do not drive yourself to the hospital.       Summary    •Syncope refers to a condition in which a person temporarily loses consciousness. Syncope may also be called fainting or passing out. It is caused by a sudden decrease in blood flow to the brain.      •Signs that you may be about to faint include dizziness, feeling light-headed, feeling nauseous, sudden vision changes, or cold, clammy skin.      •Even though most causes of syncope are not dangerous, syncope can be a sign of a serious medical problem. Get help right away if you faint.      •If you start to feel like you might faint, sit or lie down right away. If sitting, put your head down between your legs. If lying down, raise (elevate) your feet above the level of your heart.      This information is not intended to replace advice given to you by your health care provider. Make sure you discuss any questions you have with your health care provider.      Hypokalemia    WHAT YOU NEED TO KNOW:    What is hypokalemia? Hypokalemia is a low level of potassium in your blood. Potassium helps control how your muscles, heart, and digestive system work. Hypokalemia occurs when your body loses too much potassium or does not absorb enough from food.     What causes hypokalemia?   •Diarrhea or vomiting      •Medicines, such as diuretics, blood pressure medicines, or antibiotics      •Excessive use of laxatives      •Anorexia or bulimia nervosa      •Medical conditions, such as Cushing syndrome or kidney disease      •Not eating enough foods that contain potassium       What are the signs and symptoms of hypokalemia? You may not have any signs or symptoms if you have mild hypokalemia. You may have any of the following if it is more severe:   •Fatigue      •Constipation      •Frequent urination or urinating large amounts      •Muscle cramps or skin tingling      •Muscle weakness      •Fast or irregular heartbeat      How is hypokalemia diagnosed?   •An EKG test records your heart rhythm and how fast your heart beats. It is used to check for an irregular heartbeat.      •Blood tests are done to check your potassium level.      How is hypokalemia treated? You will receive potassium to bring your levels back to normal. This may be given as a pill or IV. The amount of potassium you will be given depends on your potassium level.     What foods are high in potassium? Foods that are high in potassium include bananas, oranges, tomatoes, potatoes, and avocado. Tafoya beans, turkey, salmon, lean beef, yogurt, and milk are also high in potassium. Ask your healthcare provider or dietitian for more information about foods that are high in potassium.     When should I seek immediate care?   •You cannot move your arm or leg.      •You have a fast or irregular heartbeat.      •You are too tired or weak to stand up.      When should I contact my healthcare provider?   •You are vomiting, or you have diarrhea.      •You have numbness or tingling in your arms or legs.      •Your symptoms do not go away or they get worse.      •You have questions or concerns about your condition or care.      CARE AGREEMENT:    You have the right to help plan your care. Learn about your health condition and how it may be treated. Discuss treatment options with your healthcare providers to decide what care you want to receive. You always have the right to refuse treatment.

## 2022-10-17 NOTE — ED PROVIDER NOTE - PATIENT PORTAL LINK FT
You can access the FollowMyHealth Patient Portal offered by [facility] by registering at the following website: [email or web address]. By joining [facility]’s FollowMyHealth portal, you will also be able to view your health information using other applications (apps) compatible with our system.

## 2022-10-18 VITALS
SYSTOLIC BLOOD PRESSURE: 144 MMHG | DIASTOLIC BLOOD PRESSURE: 66 MMHG | HEART RATE: 76 BPM | RESPIRATION RATE: 18 BRPM | TEMPERATURE: 98 F | OXYGEN SATURATION: 96 %

## 2022-10-18 PROCEDURE — 36415 COLL VENOUS BLD VENIPUNCTURE: CPT

## 2022-10-18 PROCEDURE — 85730 THROMBOPLASTIN TIME PARTIAL: CPT

## 2022-10-18 PROCEDURE — 85025 COMPLETE CBC W/AUTO DIFF WBC: CPT

## 2022-10-18 PROCEDURE — 93005 ELECTROCARDIOGRAM TRACING: CPT

## 2022-10-18 PROCEDURE — 99284 EMERGENCY DEPT VISIT MOD MDM: CPT | Mod: 25

## 2022-10-18 PROCEDURE — 83605 ASSAY OF LACTIC ACID: CPT

## 2022-10-18 PROCEDURE — 71045 X-RAY EXAM CHEST 1 VIEW: CPT

## 2022-10-18 PROCEDURE — 96376 TX/PRO/DX INJ SAME DRUG ADON: CPT

## 2022-10-18 PROCEDURE — 84484 ASSAY OF TROPONIN QUANT: CPT

## 2022-10-18 PROCEDURE — 87635 SARS-COV-2 COVID-19 AMP PRB: CPT

## 2022-10-18 PROCEDURE — 85610 PROTHROMBIN TIME: CPT

## 2022-10-18 PROCEDURE — 83735 ASSAY OF MAGNESIUM: CPT

## 2022-10-18 PROCEDURE — 96374 THER/PROPH/DIAG INJ IV PUSH: CPT

## 2022-10-18 PROCEDURE — 83880 ASSAY OF NATRIURETIC PEPTIDE: CPT

## 2022-10-18 PROCEDURE — 87040 BLOOD CULTURE FOR BACTERIA: CPT

## 2022-10-18 PROCEDURE — 82962 GLUCOSE BLOOD TEST: CPT

## 2022-10-18 PROCEDURE — 80053 COMPREHEN METABOLIC PANEL: CPT

## 2022-10-18 RX ADMIN — Medication 100 MILLIEQUIVALENT(S): at 00:25

## 2022-10-18 NOTE — ED ADULT NURSE REASSESSMENT NOTE - NS ED NURSE REASSESS COMMENT FT1
Pt is alert and oriented x2. Family at the bedside. While removing protective IV dressing using scissors, pt suddenly moved her hand and, was accidentally cut on the top of her left hand. No active bleeding noted. Pt denies any distress. Dr. El and RN manager made aware. Wound assessed and provided education on wound care by Dr. El. Dressing applied. Per MD, no further intervention needed. Pt is alert and oriented x2. Family at the bedside. While removing protective IV dressing using scissors, pt suddenly moved her hand, and accidentally sustained a small cut on the top of her left hand. No active bleeding noted. Pt denies any distress. Dr. El and RN manager made aware. Wound assessed and education provided on wound care by Dr. El. Dressing applied. Per MD, no further intervention needed.

## 2022-11-04 NOTE — ED ADULT NURSE NOTE - NSIMPLEMENTINTERV_GEN_ALL_ED
Patient is critically ill, requiring critical care services.
Implemented All Universal Safety Interventions:  [place] to call system. Call bell, personal items and telephone within reach. Instruct patient to call for assistance. Room bathroom lighting operational. Non-slip footwear when patient is off stretcher. Physically safe environment: no spills, clutter or unnecessary equipment. Stretcher in lowest position, wheels locked, appropriate side rails in place.

## 2023-08-30 NOTE — PROGRESS NOTE ADULT - PROBLEM/PLAN-9
DISPLAY PLAN FREE TEXT
Statement Selected
DISPLAY PLAN FREE TEXT

## 2023-11-06 NOTE — PROGRESS NOTE ADULT - NEGATIVE NEUROLOGICAL SYMPTOMS
no syncope/no tremors/no vertigo/no loss of sensation/no headache
1

## 2023-11-24 NOTE — ED ADULT NURSE NOTE - NS ED NURSE LEVEL OF CONSCIOUSNESS AFFECT
"Encounter Date: 11/24/2023       History     Chief Complaint   Patient presents with    Otalgia     Pt to the ER w/ complaints of ear pain x 3 days. Pt reports pain to R ear, "its clogged up and I can't hear out of it." Pt recently on azithromycin for a sinus infection.      59-year-old female complaining of right ear pain and congestion for the past 3-4 days.  Has a history of this in the past.  Her left ear was operated on in the past and has no canal.  Her right ear has a small canal.  Denies any fever.  Minimally decreased hearing.  Not ill appearing, alert oriented x4, GCS is 15      Review of patient's allergies indicates:  No Known Allergies  History reviewed. No pertinent past medical history.  Past Surgical History:   Procedure Laterality Date    HYSTERECTOMY      INNER EAR SURGERY Left     OOPHORECTOMY       Family History   Problem Relation Age of Onset    No Known Problems Mother     No Known Problems Father     No Known Problems Sister     No Known Problems Daughter     No Known Problems Maternal Aunt     No Known Problems Maternal Uncle     No Known Problems Paternal Aunt     No Known Problems Paternal Uncle     No Known Problems Maternal Grandmother     No Known Problems Maternal Grandfather     No Known Problems Paternal Grandmother     No Known Problems Paternal Grandfather     Breast cancer Neg Hx     Ovarian cancer Neg Hx     BRCA 1/2 Neg Hx      Social History     Tobacco Use    Smoking status: Never   Substance Use Topics    Alcohol use: Never    Drug use: Never     Review of Systems   Constitutional:  Negative for fever.   HENT:  Positive for ear pain. Negative for sore throat.    Respiratory:  Negative for shortness of breath.    Cardiovascular:  Negative for chest pain.   Gastrointestinal:  Negative for nausea.   Genitourinary:  Negative for dysuria.   Musculoskeletal:  Negative for back pain.   Skin:  Negative for rash.   Neurological:  Negative for weakness.   Hematological:  Does not " bruise/bleed easily.   All other systems reviewed and are negative.      Physical Exam     Initial Vitals   BP Pulse Resp Temp SpO2   11/24/23 0823 11/24/23 0823 11/24/23 0823 11/24/23 0829 11/24/23 0823   (!) 163/78 82 18 97.8 °F (36.6 °C) 95 %      MAP       --                Physical Exam    Nursing note and vitals reviewed.  Constitutional: She appears well-developed and well-nourished. She is not diaphoretic. No distress.   HENT:   Head: Normocephalic and atraumatic.   Mouth/Throat: Oropharynx is clear and moist.   Right ear canal with erythema and swelling, TM appears normal, no perforation   Eyes: Conjunctivae and EOM are normal. Pupils are equal, round, and reactive to light.   Neck: Neck supple.   Normal range of motion.  Cardiovascular:  Normal rate, regular rhythm, normal heart sounds and intact distal pulses.           No murmur heard.  Pulmonary/Chest: Breath sounds normal. No respiratory distress. She has no wheezes. She has no rhonchi. She has no rales. She exhibits no tenderness.   Abdominal: Abdomen is soft. Bowel sounds are normal.   Musculoskeletal:         General: No tenderness or edema. Normal range of motion.      Cervical back: Normal range of motion and neck supple.     Neurological: She is alert and oriented to person, place, and time. She has normal strength. No cranial nerve deficit. GCS score is 15. GCS eye subscore is 4. GCS verbal subscore is 5. GCS motor subscore is 6.   Skin: Skin is warm and dry. Capillary refill takes less than 2 seconds.         ED Course   Procedures  Labs Reviewed - No data to display       Imaging Results    None          Medications   methylPREDNISolone acetate injection 120 mg (has no administration in time range)     Medical Decision Making  Risk  Prescription drug management.                          Medical Decision Making:   Differential Diagnosis:   Serous otitis media, otitis externa          Clinical Impression:  Final diagnoses:  [H92.01] Otalgia of  right ear (Primary)  [H60.501] Acute otitis externa of right ear, unspecified type          ED Disposition Condition    Discharge Stable          ED Prescriptions       Medication Sig Dispense Start Date End Date Auth. Provider    ciprofloxacin-dexAMETHasone 0.3-0.1% (CIPRODEX) 0.3-0.1 % DrpS Place 4 drops into both ears 2 (two) times daily. 7.5 mL 11/24/2023 -- Phi Walden MD          Follow-up Information       Follow up With Specialties Details Why Contact Info Additional Information    Primary care physician  In 2 days       [facility] Emergency Department Emergency Medicine  As needed [address] [place] [address] [phone] Floor 1             Phi Walden MD  11/24/23 4940     Calm

## 2024-08-09 NOTE — PROGRESS NOTE ADULT - PROBLEM SELECTOR PLAN 3
[facility]    Follow-up Note  [facility]    SUBJECTIVE:  This 69 year old woman is seen in follow-up with a chief complaint of hearing loss, vertigo, and right CPA mass.     She saw Dr. Hennessy in 1/2024 for bilateral hearing loss with worsening right hearing over the previous several months. She noted her right ear has always been her worse hearing ear. Audiogram in 2020 showed bilateral sloping hearing loss with a right sided asymmetry of about 10-15 dB throughout. Workup included MRI Brain 1/26/2024, which noted a punctate 2-3mm area of enhancement in the distal right IAC. She has a family history of hearing loss in her sisters and mom. She wears bilateral hearing aids.     She established care with our neurotology clinic in 3/2024 with her daughter-in-law, Mihaela, who is a PA. Options were discussed and she elected to monitor the tumor with serial imaging.     She underwent MRI Brain on 8/8/2024, which I reviewed and is noted below. She presents today with her son, Karri, to discuss her results. She reports she's had hearing loss about 8-9 years. She has some imbalance and R>L tinnitus. She is doing ok with her hearing aids, but struggles to her with background noise.     She speaks David. She is not a smoker. She has a past medical history including, but not limited to, right breast CA s/p partial mastectomy and sentinel node biopsy and eventual dissection with radiotherapy (on exemestane, no chemotherapy) and right upper extremity lymphedema.    ASSESSMENT/PLAN:  Problem List Items Addressed This Visit          Active Problems    CPA tumor, right - Primary     Her MRI from 8/8/2024 showed the tumor is stable and unchanged. Given the size of the tumor, she elected to continue monitoring it with annual MRI's and audiograms. She will return in 1 year with an audiogram after completing a new MRI to go over her results. I informed her to call and schedule the MRI to be completed at least  1 week prior to this follow-up appointment. The patient verbalized understanding and agreed to the plan. All questions were addressed at this time.          Sensorineural hearing loss (SNHL) of both ears     Her audiogram from today showed stable hearing thresholds, but her word discriminations were not tested. Her word discriminations have not been tested since 2019 and at that time, they were 80% on the left and 67% on the right. At that time, she did not qualify for a CI. It is possible she would qualify for a CI now, but in order to determine if she is a candidate, I will need to obtain further audiometric testing.    [place] BIO and CNC Word Recognition testing is performed with hearing aids under optimal conditions. Imaging studies are also needed to ensure that there are no anatomical issue or contraindications to surgery. I explained what an implant is and what it does. I also went over the risks and benefits of the surgical procedure in the event she wished to proceed provided she is a candidate. The risks of a Cochlear Implantation include but are not limited to bleeding, pain, infection, scar, need for re-operation, temporary or permanent damage to the nerve to the face, cosmetic change or deformity, paralysis of half the face that can be temporary or permanent, taste disruption to half the tongue that can be temporary or permanent, dizziness that can be temporary or permanent, temporary or permanent loss of hearing, decreased hearing, increased ringing in the ear that can be temporary or permanent and numbness to top of head and ear. I also discussed the implanted ear typically loses its remaining hearing.    If she was interested in a CI, I would implant her right side, as her left side is her better hearing ear. In this case, we would discuss radiating her tumor prior to implanting her ear.    We also discussed the option of continued hearing aid use, the option of no treatment, as well as the option of  considering other opinions. After discussion, she stated she was not interested in surgery. She is to contact our office if she changes her mind. I also encouraged her to schedule a hearing aid evaluation with one of our audiologists if she was interested. She could try a closed mold if she can tolerate it.     She is getting her word discriminations tested today after this appointment. The patient verbalized understanding and agreed to the plan. All questions were addressed at this time.           ROS: I have reviewed the review of systems by the RN listed in our EMR (electronic medical record) and concur with those findings.     Current Outpatient Medications   Medication Sig    exemestane (AROMASIN) 25 MG tablet Take 1 tablet by mouth daily.    fluticasone (FLONASE) 50 MCG/ACT nasal spray Spray in each nostril daily. (Patient not taking: Reported on 3/13/2024)    omeprazole (PRILOSEC) 40 MG capsule Take 40 mg by mouth daily. (Patient not taking: Reported on 3/13/2024)     No current facility-administered medications for this visit.     Allergies   Allergen Reactions    Augmentin [Amoclan] GI UPSET    Keflex Other (See Comments)     Unknown reaction    Sulfa Antibiotics Other (See Comments)     Unknown reaction      No past medical history on file.  Past Surgical History:   Procedure Laterality Date    APPENDECTOMY  1999    CATARACT EXTRACTION, BILATERAL Bilateral     EYE SURGERY      HYSTERECTOMY  2004     History reviewed. No pertinent family history.    EXAM:   Visit Vitals  /78 (BP Location: LUE - Left upper extremity, Patient Position: Sitting, Cuff Size: Regular)   Pulse 73   Wt 83 kg (182 lb 14 oz)   SpO2 99%   BMI 36.94 kg/m²     CONSTITUTIONAL:  Sherrell Kelley is well-developed and well-nourished; She is alert and cooperative.   EARS:       RIGHT EAR:   Pinna normal. Patient wears a hearing aid.       LEFT EAR:   Pinna normal. Patient wears a hearing aid.   HEARING:       RIGHT EAR:   Decreased  hearing - sensorineural.        LEFT EAR:  Decreased hearing- sensorineural.   EYES:   Lids normal.   HEAD/NECK/MOUTH:   Normocephalic and atraumatic. The face is normal/symmetric. Lips normal.   NOSE/LARYNX/LYMPH:   Nose normal.   RESPIRATORY:  Effort normal. No respiratory distress.    NEUROLOGICAL:   Patient is alert and oriented to person, place, and time. Cranial nerves II-XII intact with exceptions and VIII abnormal.   PSYCHIATRIC:   Normal behavior. Normal thought content.     TODAY'S IMAGE: None    PERTINENT IMAGING:   MRI Brain, 8/8/2024  The radiology report is not available at the time of this appointment.    My interpretation of the MRI is: right tiny distal fundal mass.     AUDIOLOGY:   Results for orders placed or performed in visit on 08/16/24   Tympanometry [[id number]]    Narrative    Coco Bunn, KATIE     8/16/2024 12:06 PM  AUDIOLOGIC EVALUATION    SUBJECTIVE:  Patient is seen today at the request of Vna Rebollar MD.   Patient is accompanied by her son.    Patient has a known right cerebellopontine angle tumor.    Patient was most recently seen for an audiologic evaluation on   4/25/2024, and the results demonstrated an asymmetric   sensorineural hearing loss, right ear worse than left.    OBJECTIVE:  Otoscopic Evaluation:  Right Ear: clear canal  Left Ear: clear canal    Acoustic Immittance:  Right ear:  Pressure: Within normal limits  Compliance: Within normal limits  Ear Canal Volume: Within normal limits    Left ear:  Pressure: Within normal limits  Compliance: Within normal limits  Ear Canal Volume: Within normal limits    Audiogram:  Right ear:  Moderately severe to profound sensorineural hearing   loss   Left ear:  Moderately severe to profound sensorineural hearing   loss     A 10-15 dB asymmetry was measured from 7368-3703 Hz, right ear   worse than left.    Speech Audiometry:  Speech Reception Thresholds:  unable to test due to language   barrier and time constraints  Word  Recognition Test Scores:  unable to test due to language   barrier and time constraints    IMPRESSIONS:  Diagnosis:  (H90.3) Asymmetric sensorineural hearing loss    Right ear worse than left.    RECOMMENDATIONS:  These results will be forwarded to Van Rebollar MD, who will   discuss them with the patient.    My interpretation of the Audiogram is: Right ear shows severe to profound SNHL . Left ear show severe to profound SNHL. Left is better than the right.     Prior Laboratory Testing:  Lab Results   Component Value Date    HGBA1C 6.3 (H) 06/25/2024     On 8/16/2024, IJohanna MA scribed the services personally performed by [facility]      The documentation recorded by the scribe accurately and completely reflects the service(s) I personally performed and the decisions made by me.    [facility]   hx of hypothyroidism  -Labs show elevated TSH, both t3 and t4 low  -continue levothyroxine 50mcg daily.   -Endocrine Dr. Corona following

## 2024-10-15 NOTE — PROGRESS NOTE ADULT - NEGATIVE RESPIRATORY AND THORAX SYMPTOMS
PT. TO ED WITH C/O SHORTNESS OF BREATH  FOR ABOUT 4 DAYS.  HX COPD.  
no wheezing/no dyspnea/no cough/no hemoptysis

## 2024-12-09 ENCOUNTER — EMERGENCY (EMERGENCY)
Facility: HOSPITAL | Age: 79
LOS: 1 days | Discharge: HOME | End: 2024-12-09
Attending: EMERGENCY MEDICINE
Payer: MEDICARE

## 2024-12-09 VITALS
OXYGEN SATURATION: 97 % | HEART RATE: 121 BPM | TEMPERATURE: 100 F | SYSTOLIC BLOOD PRESSURE: 119 MMHG | DIASTOLIC BLOOD PRESSURE: 71 MMHG | RESPIRATION RATE: 18 BRPM

## 2024-12-09 VITALS
TEMPERATURE: 98 F | SYSTOLIC BLOOD PRESSURE: 121 MMHG | HEART RATE: 118 BPM | OXYGEN SATURATION: 98 % | DIASTOLIC BLOOD PRESSURE: 78 MMHG | RESPIRATION RATE: 20 BRPM

## 2024-12-09 LAB
ALBUMIN SERPL ELPH-MCNC: 2.7 G/DL — LOW (ref 3.5–5)
ALP SERPL-CCNC: 94 U/L — SIGNIFICANT CHANGE UP (ref 40–120)
ALT FLD-CCNC: 11 U/L DA — SIGNIFICANT CHANGE UP (ref 10–60)
ANION GAP SERPL CALC-SCNC: 6 MMOL/L — SIGNIFICANT CHANGE UP (ref 5–17)
AST SERPL-CCNC: 12 U/L — SIGNIFICANT CHANGE UP (ref 10–40)
BASOPHILS # BLD AUTO: 0.11 K/UL — SIGNIFICANT CHANGE UP (ref 0–0.2)
BASOPHILS NFR BLD AUTO: 0.6 % — SIGNIFICANT CHANGE UP (ref 0–2)
BILIRUB SERPL-MCNC: 1 MG/DL — SIGNIFICANT CHANGE UP (ref 0.2–1.2)
BUN SERPL-MCNC: 10 MG/DL — SIGNIFICANT CHANGE UP (ref 7–18)
CALCIUM SERPL-MCNC: 9.2 MG/DL — SIGNIFICANT CHANGE UP (ref 8.4–10.5)
CHLORIDE SERPL-SCNC: 103 MMOL/L — SIGNIFICANT CHANGE UP (ref 96–108)
CO2 SERPL-SCNC: 25 MMOL/L — SIGNIFICANT CHANGE UP (ref 22–31)
CREAT SERPL-MCNC: 0.89 MG/DL — SIGNIFICANT CHANGE UP (ref 0.5–1.3)
EGFR: 66 ML/MIN/1.73M2 — SIGNIFICANT CHANGE UP
EOSINOPHIL # BLD AUTO: 0.36 K/UL — SIGNIFICANT CHANGE UP (ref 0–0.5)
EOSINOPHIL NFR BLD AUTO: 2.1 % — SIGNIFICANT CHANGE UP (ref 0–6)
GLUCOSE SERPL-MCNC: 114 MG/DL — HIGH (ref 70–99)
HCT VFR BLD CALC: 39.3 % — SIGNIFICANT CHANGE UP (ref 34.5–45)
HGB BLD-MCNC: 13.1 G/DL — SIGNIFICANT CHANGE UP (ref 11.5–15.5)
IMM GRANULOCYTES NFR BLD AUTO: 0.5 % — SIGNIFICANT CHANGE UP (ref 0–0.9)
LACTATE SERPL-SCNC: 0.9 MMOL/L — SIGNIFICANT CHANGE UP (ref 0.7–2)
LIDOCAIN IGE QN: 13 U/L — SIGNIFICANT CHANGE UP (ref 13–75)
LYMPHOCYTES # BLD AUTO: 12.1 % — LOW (ref 13–44)
LYMPHOCYTES # BLD AUTO: 2.08 K/UL — SIGNIFICANT CHANGE UP (ref 1–3.3)
MCHC RBC-ENTMCNC: 29.5 PG — SIGNIFICANT CHANGE UP (ref 27–34)
MCHC RBC-ENTMCNC: 33.3 G/DL — SIGNIFICANT CHANGE UP (ref 32–36)
MCV RBC AUTO: 88.5 FL — SIGNIFICANT CHANGE UP (ref 80–100)
MONOCYTES # BLD AUTO: 1.13 K/UL — HIGH (ref 0–0.9)
MONOCYTES NFR BLD AUTO: 6.6 % — SIGNIFICANT CHANGE UP (ref 2–14)
NEUTROPHILS # BLD AUTO: 13.43 K/UL — HIGH (ref 1.8–7.4)
NEUTROPHILS NFR BLD AUTO: 78.1 % — HIGH (ref 43–77)
NRBC # BLD: 0 /100 WBCS — SIGNIFICANT CHANGE UP (ref 0–0)
PLATELET # BLD AUTO: 406 K/UL — HIGH (ref 150–400)
POTASSIUM SERPL-MCNC: 3.7 MMOL/L — SIGNIFICANT CHANGE UP (ref 3.5–5.3)
POTASSIUM SERPL-SCNC: 3.7 MMOL/L — SIGNIFICANT CHANGE UP (ref 3.5–5.3)
PROT SERPL-MCNC: 8.2 G/DL — SIGNIFICANT CHANGE UP (ref 6–8.3)
RBC # BLD: 4.44 M/UL — SIGNIFICANT CHANGE UP (ref 3.8–5.2)
RBC # FLD: 13.3 % — SIGNIFICANT CHANGE UP (ref 10.3–14.5)
SODIUM SERPL-SCNC: 134 MMOL/L — LOW (ref 135–145)
WBC # BLD: 17.19 K/UL — HIGH (ref 3.8–10.5)
WBC # FLD AUTO: 17.19 K/UL — HIGH (ref 3.8–10.5)

## 2024-12-09 PROCEDURE — 71045 X-RAY EXAM CHEST 1 VIEW: CPT

## 2024-12-09 PROCEDURE — 93010 ELECTROCARDIOGRAM REPORT: CPT

## 2024-12-09 PROCEDURE — 99285 EMERGENCY DEPT VISIT HI MDM: CPT

## 2024-12-09 PROCEDURE — 87040 BLOOD CULTURE FOR BACTERIA: CPT

## 2024-12-09 PROCEDURE — 96374 THER/PROPH/DIAG INJ IV PUSH: CPT | Mod: XU

## 2024-12-09 PROCEDURE — 99053 MED SERV 10PM-8AM 24 HR FAC: CPT

## 2024-12-09 PROCEDURE — 74177 CT ABD & PELVIS W/CONTRAST: CPT | Mod: MC

## 2024-12-09 PROCEDURE — 83690 ASSAY OF LIPASE: CPT

## 2024-12-09 PROCEDURE — 74177 CT ABD & PELVIS W/CONTRAST: CPT | Mod: 26,MC

## 2024-12-09 PROCEDURE — 93005 ELECTROCARDIOGRAM TRACING: CPT

## 2024-12-09 PROCEDURE — 71045 X-RAY EXAM CHEST 1 VIEW: CPT | Mod: 26

## 2024-12-09 PROCEDURE — 83605 ASSAY OF LACTIC ACID: CPT

## 2024-12-09 PROCEDURE — 85025 COMPLETE CBC W/AUTO DIFF WBC: CPT

## 2024-12-09 PROCEDURE — 36415 COLL VENOUS BLD VENIPUNCTURE: CPT

## 2024-12-09 PROCEDURE — 80053 COMPREHEN METABOLIC PANEL: CPT

## 2024-12-09 PROCEDURE — 99285 EMERGENCY DEPT VISIT HI MDM: CPT | Mod: 25

## 2024-12-09 RX ORDER — SODIUM CHLORIDE 9 MG/ML
1000 INJECTION, SOLUTION INTRAMUSCULAR; INTRAVENOUS; SUBCUTANEOUS ONCE
Refills: 0 | Status: COMPLETED | OUTPATIENT
Start: 2024-12-09 | End: 2024-12-09

## 2024-12-09 RX ORDER — PIPERACILLIN SODIUM AND TAZOBACTAM SODIUM 4; .5 G/20ML; G/20ML
3.38 INJECTION, POWDER, LYOPHILIZED, FOR SOLUTION INTRAVENOUS ONCE
Refills: 0 | Status: COMPLETED | OUTPATIENT
Start: 2024-12-09 | End: 2024-12-09

## 2024-12-09 RX ORDER — AMOXICILLIN/POTASSIUM CLAV 250-125 MG
875 TABLET ORAL
Qty: 20 | Refills: 0
Start: 2024-12-09 | End: 2024-12-18

## 2024-12-09 RX ADMIN — SODIUM CHLORIDE 1000 MILLILITER(S): 9 INJECTION, SOLUTION INTRAMUSCULAR; INTRAVENOUS; SUBCUTANEOUS at 03:12

## 2024-12-09 RX ADMIN — PIPERACILLIN SODIUM AND TAZOBACTAM SODIUM 200 GRAM(S): 4; .5 INJECTION, POWDER, LYOPHILIZED, FOR SOLUTION INTRAVENOUS at 05:42

## 2024-12-09 NOTE — ED PROVIDER NOTE - CARE PROVIDER_API CALL
Kade Mcdonnell [facility]  Surgery  [address], [address]  [place] [address]  Phone: [phone]  Fax: [phone]  Follow Up Time:

## 2024-12-09 NOTE — ED PROVIDER NOTE - CARE PLAN
Principal Discharge DX:	Abdominal pain   1 Principal Discharge DX:	Abdominal pain  Secondary Diagnosis:	Diverticulitis

## 2024-12-09 NOTE — ED ADULT NURSE NOTE - AVIAN FLU SYMPTOMS
After Visit Summary   5/18/2018    Ekaterina Delcid    MRN: [id number]           Patient Information     Date Of Birth          1952        Visit Information        Provider Department      5/18/2018 9:40 AM Roscoe Gorman PT [facility] Physical Therapy        Today's Diagnoses     Abnormal gait    -  1    Right knee pain        Edema        Status post total right knee replacement           Follow-ups after your visit        Your next 10 appointments already scheduled     May 21, 2018  8:50 AM CDT   EMIR Extremity with Roscoe Gorman PT   [facility] Physical Therapy ([facility]  )    [address] [place] [address]   [phone]            May 21, 2018  9:30 AM CDT   EMIR Extremity with Roscoe Gorman PT   [facility] Physical Therapy ([facility]  )    [address] [place] [address]   [phone]            May 24, 2018  9:30 AM CDT   EMIR Extremity with Roscoe Gorman PT   [facility] Physical Therapy ([facility]  )    [address] [place] [address]   [phone]            May 24, 2018 10:10 AM CDT   EMIR Extremity with Roscoe Gorman PT   [facility] Physical Therapy ([facility]  )    [address] [place] [address]   [phone]            May 30, 2018  8:50 AM CDT   EMIR Extremity with Roscoe Gorman PT   [facility] Physical Therapy ([facility]  )    [address] [place] [address]   [phone]            Jun 07, 2018  9:30 AM CDT   EMIR Extremity with Roscoe Gorman PT   [facility] Physical Therapy ([facility]  )    [address] [place] [address]   [phone]              Who to contact     If you have questions or need follow up information about today's  "clinic visit or your schedule please contact [facility]ERAP[facility] directly at [phone].  Normal or non-critical lab and imaging results will be communicated to you by [employer], letter or phone within 4 business days after the clinic has received the results. If you do not hear from us within 7 days, please contact the clinic through [employer] or phone. If you have a critical or abnormal lab result, we will notify you by phone as soon as possible.  Submit refill requests through [employer] or call your pharmacy and they will forward the refill request to us. Please allow 3 business days for your refill to be completed.          Additional Information About Your Visit        [employer] Information     [employer] lets you send messages to your doctor, view your test results, renew your prescriptions, schedule appointments and more. To sign up, go to [email or web address] . Click on \"Log in\" on the left side of the screen, which will take you to the Welcome page. Then click on \"Sign up Now\" on the right side of the page.     You will be asked to enter the access code listed below, as well as some personal information. Please follow the directions to create your username and password.     Your access code is: [email or web address]  Expires: 2018 10:14 AM     Your access code will  in 90 days. If you need help or a new code, please call your [facility] clinic or [phone].        Care EveryWhere ID     This is your Care EveryWhere ID. This could be used by other organizations to access your [facility] medical records  [id number]         Blood Pressure from Last 3 Encounters:   05/19/15 133/89   11/10/14 135/64   14 123/83    Weight from Last 3 Encounters:   14 72.8 kg (160 lb 8 oz)   14 73.5 kg (162 lb)   14 73.5 kg (162 lb)              We Performed the Following     HC PT EVAL, LOW COMPLEXITY     EMIR CERT REPORT     EMIR INITIAL EVAL REPORT     THERAPEUTIC " EXERCISES        Primary Care Provider Office Phone # Fax #    Mark Torres [phone]       [facility]C [address] [facility] [address]  [facility] [address]        Equal Access to Services     KINGS MOREIRA : Hadii ihsan dobbins kvngo Solloydali, waaxda luqadaha, qaybta kaalmada adefranco, zafar yaonolvia crowe. So [facility] [phone].    ATENCIÓN: Si habla español, tiene a dobson disposición servicios gratuitos de asistencia lingüística. Llame al [phone].    We comply with applicable federal civil rights laws and [place] laws. We do not discriminate on the basis of race, color, national origin, age, disability, sex, sexual orientation, or gender identity.            Thank you!     Thank you for [facility] PH[facility]ERAP[facility]  for your care. Our goal is always to provide you with excellent care. Hearing back from our patients is one way we can continue to improve our services. Please take a few minutes to complete the written survey that you may receive in the mail after your visit with us. Thank you!             Your Updated Medication List - Protect others around you: Learn how to safely use, store and throw away your medicines at www.disposemymeds.org.          This list is accurate as of 5/18/18 10:23 AM.  Always use your most recent med list.                   Brand Name Dispense Instructions for use Diagnosis    ASPIRIN PO      Take 81 mg by mouth daily        ATORVASTATIN CALCIUM PO      Take 40 mg by mouth daily        BUSPIRONE HCL PO      Take 30 mg by mouth 2 times daily        carboxymethylcellulose 0.5 % Soln ophthalmic solution    REFRESH PLUS    1 Bottle    Place 1 drop into both eyes 3 times daily as needed for dry eyes    Dry eyes, bilateral       COENZYME Q-10 PO      Take 100 mg by mouth daily         MG Caps      Take 3 capsules by mouth daily        DHA-EPA-Vit B6-B12-Folic Acid Caps      Take 1 capsule by  mouth daily        diclofenac 1 % Gel topical gel    VOLTAREN    100 g    Apply 4 grams to knees or 2 grams to hands four times daily using enclosed dosing card.    Generalized osteoarthrosis, unspecified site       gabapentin 300 MG capsule    NEURONTIN    120 capsule    Take 1 capsule (300 mg) by mouth 4 times daily    Chronic pain       LEVOTHYROXINE SODIUM PO      Take 0.125 mg by mouth 1 tablet 6 days per week and 1/2 tablet the 7th day        LISINOPRIL PO      Take 5 mg by mouth daily        magnesium 100 MG Caps      Take 100 mg by mouth daily        METOPROLOL TARTRATE PO      Take 12.5 mg by mouth daily        MULTIVITAMIN PO      Take 2 capsules by mouth daily        NITROGLYCERIN SL      Place 0.4 mg under the tongue every 5 minutes as needed        TYLENOL PO      Take 500-1,000 mg by mouth every 4 hours as needed for mild pain or fever        VISTARIL PO      Take 50 mg by mouth 4 times daily        Vitamin D-3 1000 units Caps      Take 1 capsule by mouth daily        ZOLOFT 100 MG tablet   Generic drug:  sertraline      Take by mouth daily        zolpidem 6.25 MG CR tablet    AMBIEN CR    15 tablet    Take 1 tablet (6.25 mg) by mouth nightly as needed for sleep    Insomnia          No

## 2024-12-09 NOTE — ED PROVIDER NOTE - PHYSICAL EXAMINATION
No distress  Patient oriented to self, date of birth current date.  Abdomen: Soft, right lower quadrant discomfort, no rebound, no rigidity, no pulsatile mass  Bilateral atrophic lower extremities slow purposeful motor activity of foot and leg.

## 2024-12-09 NOTE — ED ADULT TRIAGE NOTE - CHIEF COMPLAINT QUOTE
She has abdominal pain, intermittently.  She has UTI 1 week ago.  Patient's baseline-alert, oriented x name, place She has abdominal pain, intermittently.  She had UTI 1 week ago.

## 2024-12-09 NOTE — ED ADULT NURSE NOTE - ED STAT RN HANDOFF DETAILS
Patient AxOx3 denies abdominal pain , nausea, vomiting, MD Thakkar made aware on V/S no additional order given. Patient  D/C home as per MD order via stretcher accompanied by granddaughter

## 2024-12-09 NOTE — ED PROVIDER NOTE - PATIENT PORTAL LINK FT
You can access the FollowMyHealth Patient Portal offered by [facility] by registering at the following website: [email or web address]. By joining [employer]’s FollowMyHealth portal, you will also be able to view your health information using other applications (apps) compatible with our system.

## 2024-12-09 NOTE — CONSULT NOTE ADULT - SUBJECTIVE AND OBJECTIVE BOX
HPI:    < from: CT Abdomen and Pelvis w/ IV Cont (12.09.24 @ 05:02) >  FINDINGS:  LOWER CHEST: Within normal limits.    LIVER: Within normal limits.  BILE DUCTS: Normal caliber.  GALLBLADDER: Cholecystectomy.  SPLEEN: Within normal limits.  PANCREAS: Within normal limits.  ADRENALS: Within normal limits.  KIDNEYS/URETERS: Within normal limits.    BLADDER: Within normal limits.  REPRODUCTIVE ORGANS: Hysterectomy.    BOWEL: A distal loop of small bowel in the left lower quadrant   demonstrates a large diverticulum with inflammation in the surrounding   fat versus a contained perforation. This measures approximately 3.8 x3.5   cm (2:106). This was not seen on the previous exam.    Small hiatal hernia. No bowel obstruction. Appendix is normal. Scattered   small colonic diverticula.    PERITONEUM/RETROPERITONEUM: Within normal limits.  VESSELS: Within normal limits.  LYMPH NODES: No lymphadenopathy.  ABDOMINAL WALL: Within normal limits.  BONES: Within normal limits.    IMPRESSION:    A distal loop of small bowel in the left lower quadrant demonstrates a   large diverticulum with inflammation in the surrounding fatversus a   contained perforation. Malignancy is not excluded. This measures   approximately 3.8 x 3.5 cm (2:106). This was not seen on the previous   exam. Recommend surgical consultation.      < end of copied text >      PAST MEDICAL & SURGICAL HISTORY:  Dementia      Hypothyroidism      Sarcoidosis      History of cholecystectomy      No significant past surgical history          Vital Signs Last 24 Hrs  T(C): 36.9 (09 Dec 2024 05:39), Max: 36.9 (09 Dec 2024 05:39)  T(F): 98.4 (09 Dec 2024 05:39), Max: 98.4 (09 Dec 2024 05:39)  HR: 72 (09 Dec 2024 05:39) (72 - 118)  BP: 132/80 (09 Dec 2024 05:39) (121/78 - 132/80)  BP(mean): --  RR: 20 (09 Dec 2024 05:39) (20 - 20)  SpO2: 99% (09 Dec 2024 05:39) (98% - 99%)    Parameters below as of 09 Dec 2024 05:39  Patient On (Oxygen Delivery Method): room air                              13.1   17.19 )-----------( 406      ( 09 Dec 2024 03:00 )             39.3     12-09    134[L]  |  103  |  10  ----------------------------<  114[H]  3.7   |  25  |  0.89    Ca    9.2      09 Dec 2024 03:00    TPro  8.2  /  Alb  2.7[L]  /  TBili  1.0  /  DBili  x   /  AST  12  /  ALT  11  /  AlkPhos  94  12-09        PHYSICAL EXAM  General: WN/WD NAD  Neurology: A&Ox3, nonfocal, SHAH x 4  Respiratory: CTA B/L  CV: RRR, S1S2, no murmurs, rubs or gallops  Abdominal: Soft, NT, ND +BS, Last BM  Extremities: No edema, + peripheral pulses        ASSESSMENT/ PLAN:   HPI:  79-year-old female denies any PMH or PSH; currently not on meds at home per pt;  patient has been complaining of intermittent abdominal tenderness for several weeks.  No reported fever, no chest pain, no shortness of breath.  Patient is nonambulatory at baseline for past 3 years.  moving bowels and +flatus  no n/v      < from: CT Abdomen and Pelvis w/ IV Cont (12.09.24 @ 05:02) >  FINDINGS:  LOWER CHEST: Within normal limits.    LIVER: Within normal limits.  BILE DUCTS: Normal caliber.  GALLBLADDER: Cholecystectomy.  SPLEEN: Within normal limits.  PANCREAS: Within normal limits.  ADRENALS: Within normal limits.  KIDNEYS/URETERS: Within normal limits.    BLADDER: Within normal limits.  REPRODUCTIVE ORGANS: Hysterectomy.    BOWEL: A distal loop of small bowel in the left lower quadrant   demonstrates a large diverticulum with inflammation in the surrounding   fat versus a contained perforation. This measures approximately 3.8 x3.5   cm (2:106). This was not seen on the previous exam.    Small hiatal hernia. No bowel obstruction. Appendix is normal. Scattered   small colonic diverticula.    PERITONEUM/RETROPERITONEUM: Within normal limits.  VESSELS: Within normal limits.  LYMPH NODES: No lymphadenopathy.  ABDOMINAL WALL: Within normal limits.  BONES: Within normal limits.    IMPRESSION:    A distal loop of small bowel in the left lower quadrant demonstrates a   large diverticulum with inflammation in the surrounding fatversus a   contained perforation. Malignancy is not excluded. This measures   approximately 3.8 x 3.5 cm (2:106). This was not seen on the previous   exam. Recommend surgical consultation.      < end of copied text >      PAST MEDICAL & SURGICAL HISTORY:  Dementia      Hypothyroidism      Sarcoidosis      History of cholecystectomy      No significant past surgical history          Vital Signs Last 24 Hrs  T(C): 36.9 (09 Dec 2024 05:39), Max: 36.9 (09 Dec 2024 05:39)  T(F): 98.4 (09 Dec 2024 05:39), Max: 98.4 (09 Dec 2024 05:39)  HR: 72 (09 Dec 2024 05:39) (72 - 118)  BP: 132/80 (09 Dec 2024 05:39) (121/78 - 132/80)  BP(mean): --  RR: 20 (09 Dec 2024 05:39) (20 - 20)  SpO2: 99% (09 Dec 2024 05:39) (98% - 99%)    Parameters below as of 09 Dec 2024 05:39  Patient On (Oxygen Delivery Method): room air                              13.1   17.19 )-----------( 406      ( 09 Dec 2024 03:00 )             39.3     12-09    134[L]  |  103  |  10  ----------------------------<  114[H]  3.7   |  25  |  0.89    Ca    9.2      09 Dec 2024 03:00    TPro  8.2  /  Alb  2.7[L]  /  TBili  1.0  /  DBili  x   /  AST  12  /  ALT  11  /  AlkPhos  94  12-09        PHYSICAL EXAM  General: WN/WD NAD  Neurology: A&Ox3, nonfocal, SHAH x 4  Respiratory: CTA B/L  CV: RRR, S1S2  Abdominal: obese, Soft, mild LLQ tenderness, ND, no rebound or guarding, no peritoneal signs  Extremities: No edema, + peripheral pulses        ASSESSMENT/ PLAN:  79-year-old female denies any PMH or PSH; currently not on meds at home per pt;  patient has been complaining of intermittent abdominal tenderness for several weeks.  No reported fever, no chest pain, no shortness of breath.  Patient is nonambulatory at baseline for past 3 years.  moving bowels and +flatus  no n/v      < from: CT Abdomen and Pelvis w/ IV Cont (12.09.24 @ 05:02) >  FINDINGS:  LOWER CHEST: Within normal limits.    LIVER: Within normal limits.  BILE DUCTS: Normal caliber.  GALLBLADDER: Cholecystectomy.  SPLEEN: Within normal limits.  PANCREAS: Within normal limits.  ADRENALS: Within normal limits.  KIDNEYS/URETERS: Within normal limits.    BLADDER: Within normal limits.  REPRODUCTIVE ORGANS: Hysterectomy.    BOWEL: A distal loop of small bowel in the left lower quadrant   demonstrates a large diverticulum with inflammation in the surrounding   fat versus a contained perforation. This measures approximately 3.8 x3.5   cm (2:106). This was not seen on the previous exam.    Small hiatal hernia. No bowel obstruction. Appendix is normal. Scattered   small colonic diverticula.    PERITONEUM/RETROPERITONEUM: Within normal limits.  VESSELS: Within normal limits.  LYMPH NODES: No lymphadenopathy.  ABDOMINAL WALL: Within normal limits.  BONES: Within normal limits.    IMPRESSION:    A distal loop of small bowel in the left lower quadrant demonstrates a   large diverticulum with inflammation in the surrounding fatversus a   contained perforation. Malignancy is not excluded. This measures   approximately 3.8 x 3.5 cm (2:106). This was not seen on the previous   exam. Recommend surgical consultation.      < end of copied text >    case/CT findings discussed with Dr Mcdonnell  rec oral abx  oral challenge  may f/u with him next week outpt  advised to return for any f/c/n/v, worsening abd pain

## 2024-12-09 NOTE — ED PROVIDER NOTE - NSFOLLOWUPINSTRUCTIONS_ED_ALL_ED_FT
Follow-up with Dr. Kade Mcdonnell  In 1 week.   Return immediately if you have worsening pain, blood per rectum, dark stools, fever, any concerns    Diverticulitis happens when poop (stool) and bacteria get trapped in small pouches in the colon called diverticula. These pouches may form if you have a condition called diverticulosis. When the poop and bacteria get trapped, it can cause an infection and inflammation.    Diverticulitis may cause severe stomach pain and diarrhea. It can also lead to tissue damage in your colon. This can cause bleeding or blockage. In some cases, the diverticula may burst (rupture). This can cause infected poop to go into other parts of your abdomen.    What are the causes?  This condition is caused by poop getting trapped in the diverticula. This allows bacteria to grow. It can lead to inflammation and infection.    What increases the risk?  You are more likely to get this condition if you have diverticulosis. You are also more at risk if:  You are overweight or obese.  You do not get enough exercise.  You drink alcohol.  You smoke.  You eat a lot of red meat, such as beef, pork, or lamb.  You do not get enough fiber. Foods high in fiber include fruits, vegetables, beans, nuts, and whole grains.  You are over 40 years of age.  What are the signs or symptoms?  Symptoms of this condition may include:  Pain and tenderness in the abdomen. This pain is often felt on the left side but may occur in other spots.  Fever and chills.  Nausea and vomiting.  Cramping.  Bloating.  Changes in how often you poop.  Blood in your poop.  How is this diagnosed?  This condition is diagnosed based on your medical history and a physical exam. You may also have tests done to make sure there is nothing else causing your condition. These tests may include:  Blood tests.  Tests done on your pee (urine).  A CT scan of the abdomen.  You may need to have a colonoscopy. This is an exam to look at your whole large intestine. During the exam, a tube is put into the opening of your butt (anus) and then moved into your rectum, colon, and other parts of the large intestine.    This exam is done to look at the diverticula. It can also see if there is something else that may be causing your symptoms.    How is this treated?  Most cases are mild and can be treated at home. You may be told to:  Take over-the-counter pain medicine.  Only eat and drink clear liquids.  Take antibiotics.  Rest.  More severe cases may need to be treated at a hospital. Treatment may include:  Not eating or drinking.  Taking pain medicines.  Getting antibiotics through an IV.  Getting fluids and nutrition through an IV.  Surgery.  Follow these instructions at home:  Medicines    Take over-the-counter and prescription medicines only as told by your health care provider. These include fiber supplements, probiotics, and medicines to soften your poop (stool softeners).  If you were prescribed antibiotics, take them as told by your provider. Do not stop using the antibiotic even if you start to feel better.  Ask your provider if the medicine prescribed to you requires you to avoid driving or using machinery.  Eating and drinking    Pear, berries, artichoke, and beans.  Follow the diet told by your provider. You may need to only eat and drink liquids.  After your symptoms get better, you may be able to return to a more normal diet. You may be told to eat at least 25 grams (25 g) of fiber each day. Fiber makes it easier to poop. Healthy sources of fiber include:  Berries. One cup has 4–8 g of fiber.  Beans or lentils. One-half cup has 5–8 g of fiber.  Green vegetables. One cup has 4 g of fiber.  Avoid eating red meat.  General instructions    Do not use any products that contain nicotine or tobacco. These products include cigarettes, chewing tobacco, and vaping devices, such as e-cigarettes. If you need help quitting, ask your provider.  Exercise for at least 30 minutes, 3 times a week. Exercise hard enough to raise your heart rate and break a sweat.  Contact a health care provider if:  Your pain gets worse.  Your pooping does not go back to normal.  Your symptoms do not get better with treatment.  Your symptoms get worse all of a sudden.  You have a fever.  You vomit more than one time.  Your poop is bloody, black, or tarry.  This information is not intended to replace advice given to you by your health care provider. Make sure you discuss any questions you have with your health care provider.

## 2024-12-09 NOTE — ED PROVIDER NOTE - OBJECTIVE STATEMENT
79-year-old female accompanied with adult granddaughter (Deirdre) patient has been complaining intermittently of diffuse abdominal tenderness for 2 weeks.  No reported fever, no chest pain, no shortness of breath.  Patient is nonambulatory at baseline for past 3 years.  Patient denies any current urinary symptoms.

## 2024-12-09 NOTE — ED ADULT NURSE REASSESSMENT NOTE - NS ED NURSE REASSESS COMMENT FT1
Patient alert and verbally responsive, appearing not distress V/S stable. Patient will D/C home as per MD order awaiting for transportation.

## 2024-12-09 NOTE — ED ADULT NURSE NOTE - OBJECTIVE STATEMENT
patient c/o lower abdominal pain x today. Denies chest pain, dizziness, headache, nausea and vomiting. no SOB.

## 2024-12-09 NOTE — ED PROVIDER NOTE - CLINICAL SUMMARY MEDICAL DECISION MAKING FREE TEXT BOX
Patient with right lower quadrant discomfort on palpation, CT ordered to rule out possible appendicitis.  At 3:45 AM heart rate is 75 bpm.  Patient no acute distress Patient with right lower quadrant discomfort on palpation, CT ordered to rule out possible appendicitis.  At 3:45 AM heart rate is 75 bpm.  Patient no acute distress    CT reported: A distal loop of small bowel in the left lower quadrant demonstrates a large diverticulum with inflammation in the surrounding fat versus a   contained perforation. Malignancy is not excluded. This measures approximately 3.8 x 3.5 cm (2:106). This was not seen on the previous exam. Recommend surgical consultation.    530a- Surgical HO endorsed and will consult. Patient with right lower quadrant discomfort on palpation, CT ordered to rule out possible appendicitis.  At 3:45 AM heart rate is 75 bpm.  Patient no acute distress    CT reported: A distal loop of small bowel in the left lower quadrant demonstrates a large diverticulum with inflammation in the surrounding fat versus a   contained perforation. Malignancy is not excluded. This measures approximately 3.8 x 3.5 cm (2:106). This was not seen on the previous exam. Recommend surgical consultation.    530a- Surgical HO endorsed and will consult.  630am- Patient was evaluated surgical house officer informed patient can be discharged with oral antibiotics (Augmentin) and to follow-up with surgeon Dr. Kade Mcdonnell in 1 week.  Patient and family members agree with plan.  Patient has no guarding to repeat abdominal palpation, able to drink fluids and swallow solids without vomiting or distress.  No reported melena or blood per rectum.  Pt is well appearing,  stable for discharge and follow up with medical doctor. Pt educated on care and need for follow up. Discussed anticipatory guidance and return precautions. Questions answered. I had a detailed discussion with the patient regarding the historical points, exam findings, and any diagnostic results supporting the discharge diagnosis.   Safe transport arranged for pt back home.

## 2024-12-14 LAB
CULTURE RESULTS: SIGNIFICANT CHANGE UP
CULTURE RESULTS: SIGNIFICANT CHANGE UP
SPECIMEN SOURCE: SIGNIFICANT CHANGE UP
SPECIMEN SOURCE: SIGNIFICANT CHANGE UP

## (undated) DEVICE — TUBING STRYKER PNEUMOSURE HEATED RTP

## (undated) DEVICE — PACK GENERAL LAPAROSCOPY

## (undated) DEVICE — DRSG GAUZE VASELINE PETROLEUM 3 X 9"

## (undated) DEVICE — ELCTR FOOT CONTROL L WIRE LAPAROSCOPIC

## (undated) DEVICE — GLV 7 PROTEXIS (BLUE)

## (undated) DEVICE — TROCAR COVIDIEN VERSAONE BLADED SMOOTH 5MM STANDARD

## (undated) DEVICE — WARMING BLANKET UPPER ADULT

## (undated) DEVICE — SPONGE ENDO PEANUT 5MM

## (undated) DEVICE — SUT POLYSORB 2-0 30" V-20 UNDYED

## (undated) DEVICE — SYR LUER LOK 10CC

## (undated) DEVICE — TUBING STRYKEFLOW II SUCTION / IRRIGATOR

## (undated) DEVICE — DRAIN RESERVOIR FOR JACKSON PRATT 100CC CARDINAL

## (undated) DEVICE — TROCAR COVIDIEN VERSAONE BLADED SMOOTH 11MM STANDARD

## (undated) DEVICE — GLV 7 PROTEXIS (WHITE)

## (undated) DEVICE — DRSG MASTISOL

## (undated) DEVICE — SUT POLYSORB 4-0 27" P-12 UNDYED

## (undated) DEVICE — NDL HYPO SAFE 25G X 1.5" (ORANGE)

## (undated) DEVICE — D HELP - CLEARVIEW CLEARIFY SYSTEM

## (undated) DEVICE — DRAIN JACKSON PRATT 10MM FLAT 3/4 NO TROCAR

## (undated) DEVICE — PRECISION CUT SCISSOR MICROLINE MINI ENDOCUT DISP

## (undated) DEVICE — SOL IRR POUR NS 0.9% 1500ML

## (undated) DEVICE — DRAPE C ARM UNIVERSAL

## (undated) DEVICE — BLADE SURGICAL #15 CARBON

## (undated) DEVICE — DRAPE 1/2 SHEET 40X57"

## (undated) DEVICE — ELCTR GROUNDING PAD ADULT COVIDIEN

## (undated) DEVICE — DRAPE LIGHT HANDLE COVER (BLUE)

## (undated) DEVICE — VENODYNE/SCD SLEEVE CALF MEDIUM

## (undated) DEVICE — INSUFFLATION NDL COVIDIEN SURGINEEDLE VERESS 120MM

## (undated) DEVICE — DRSG BANDAID 0.75X3"

## (undated) DEVICE — SUT POLYSORB 0 30" GU-46

## (undated) DEVICE — FOR-ESU VALLEYLAB T7E15009DX: Type: DURABLE MEDICAL EQUIPMENT